# Patient Record
Sex: FEMALE | Race: WHITE | NOT HISPANIC OR LATINO | Employment: OTHER | URBAN - METROPOLITAN AREA
[De-identification: names, ages, dates, MRNs, and addresses within clinical notes are randomized per-mention and may not be internally consistent; named-entity substitution may affect disease eponyms.]

---

## 2017-04-10 ENCOUNTER — AMBULATORY - HEALTHEAST (OUTPATIENT)
Dept: INTERNAL MEDICINE | Facility: CLINIC | Age: 72
End: 2017-04-10

## 2017-04-10 DIAGNOSIS — Z79.899 ENCOUNTER FOR LONG-TERM (CURRENT) USE OF OTHER MEDICATIONS: ICD-10-CM

## 2017-04-10 DIAGNOSIS — E78.5 HYPERLIPEMIA: ICD-10-CM

## 2017-04-10 DIAGNOSIS — D75.89 MACROCYTOSIS: ICD-10-CM

## 2017-04-10 DIAGNOSIS — M89.9 DISORDER OF BONE AND CARTILAGE: ICD-10-CM

## 2017-04-10 DIAGNOSIS — M94.9 DISORDER OF BONE AND CARTILAGE: ICD-10-CM

## 2017-04-11 ENCOUNTER — RECORDS - HEALTHEAST (OUTPATIENT)
Dept: ADMINISTRATIVE | Facility: OTHER | Age: 72
End: 2017-04-11

## 2017-04-11 ENCOUNTER — OFFICE VISIT - HEALTHEAST (OUTPATIENT)
Dept: INTERNAL MEDICINE | Facility: CLINIC | Age: 72
End: 2017-04-11

## 2017-04-11 ENCOUNTER — RECORDS - HEALTHEAST (OUTPATIENT)
Dept: BONE DENSITY | Facility: CLINIC | Age: 72
End: 2017-04-11

## 2017-04-11 DIAGNOSIS — D75.89 MACROCYTOSIS: ICD-10-CM

## 2017-04-11 DIAGNOSIS — E78.5 HYPERLIPEMIA: ICD-10-CM

## 2017-04-11 DIAGNOSIS — Z79.899 ENCOUNTER FOR LONG-TERM (CURRENT) USE OF OTHER MEDICATIONS: ICD-10-CM

## 2017-04-11 DIAGNOSIS — Z11.59 SCREENING FOR VIRAL DISEASE: ICD-10-CM

## 2017-04-11 DIAGNOSIS — M89.9 DISORDER OF BONE AND CARTILAGE: ICD-10-CM

## 2017-04-11 DIAGNOSIS — M94.9 DISORDER OF BONE AND CARTILAGE: ICD-10-CM

## 2017-04-11 DIAGNOSIS — M94.9 DISORDER OF CARTILAGE, UNSPECIFIED: ICD-10-CM

## 2017-04-11 DIAGNOSIS — Z00.00 MEDICARE ANNUAL WELLNESS VISIT, INITIAL: ICD-10-CM

## 2017-04-11 DIAGNOSIS — M89.9 DISORDER OF BONE, UNSPECIFIED: ICD-10-CM

## 2017-04-11 LAB
CHOLEST SERPL-MCNC: 173 MG/DL
FASTING STATUS PATIENT QL REPORTED: YES
HDLC SERPL-MCNC: 58 MG/DL
LDLC SERPL CALC-MCNC: 93 MG/DL
TRIGL SERPL-MCNC: 108 MG/DL

## 2017-04-11 ASSESSMENT — MIFFLIN-ST. JEOR: SCORE: 1189.89

## 2017-04-12 LAB — HCV AB SERPL QL IA: NEGATIVE

## 2017-04-17 ENCOUNTER — COMMUNICATION - HEALTHEAST (OUTPATIENT)
Dept: INTERNAL MEDICINE | Facility: CLINIC | Age: 72
End: 2017-04-17

## 2017-04-17 DIAGNOSIS — E78.5 HYPERLIPIDEMIA: ICD-10-CM

## 2017-04-17 DIAGNOSIS — M81.0 OSTEOPOROSIS: ICD-10-CM

## 2017-04-19 ENCOUNTER — COMMUNICATION - HEALTHEAST (OUTPATIENT)
Dept: INTERNAL MEDICINE | Facility: CLINIC | Age: 72
End: 2017-04-19

## 2017-04-19 DIAGNOSIS — M81.0 OSTEOPOROSIS: ICD-10-CM

## 2017-08-09 ENCOUNTER — OFFICE VISIT - HEALTHEAST (OUTPATIENT)
Dept: INTERNAL MEDICINE | Facility: CLINIC | Age: 72
End: 2017-08-09

## 2017-08-09 DIAGNOSIS — M81.0 OSTEOPOROSIS: ICD-10-CM

## 2017-08-09 DIAGNOSIS — E87.5 SERUM POTASSIUM ELEVATED: ICD-10-CM

## 2017-08-09 ASSESSMENT — MIFFLIN-ST. JEOR: SCORE: 1181.95

## 2017-10-12 ENCOUNTER — RECORDS - HEALTHEAST (OUTPATIENT)
Dept: ADMINISTRATIVE | Facility: OTHER | Age: 72
End: 2017-10-12

## 2017-10-30 ENCOUNTER — COMMUNICATION - HEALTHEAST (OUTPATIENT)
Dept: INTERNAL MEDICINE | Facility: CLINIC | Age: 72
End: 2017-10-30

## 2018-02-05 ENCOUNTER — COMMUNICATION - HEALTHEAST (OUTPATIENT)
Dept: INTERNAL MEDICINE | Facility: CLINIC | Age: 73
End: 2018-02-05

## 2018-02-05 DIAGNOSIS — M81.0 OSTEOPOROSIS: ICD-10-CM

## 2018-02-05 DIAGNOSIS — E78.5 HYPERLIPIDEMIA: ICD-10-CM

## 2018-03-01 ENCOUNTER — OFFICE VISIT - HEALTHEAST (OUTPATIENT)
Dept: AUDIOLOGY | Facility: CLINIC | Age: 73
End: 2018-03-01

## 2018-03-01 DIAGNOSIS — H90.3 SENSORINEURAL HEARING LOSS, BILATERAL: ICD-10-CM

## 2018-03-01 DIAGNOSIS — H93.13 TINNITUS OF BOTH EARS: ICD-10-CM

## 2018-03-02 ENCOUNTER — COMMUNICATION - HEALTHEAST (OUTPATIENT)
Dept: ADMINISTRATIVE | Facility: CLINIC | Age: 73
End: 2018-03-02

## 2018-05-07 ENCOUNTER — AMBULATORY - HEALTHEAST (OUTPATIENT)
Dept: INTERNAL MEDICINE | Facility: CLINIC | Age: 73
End: 2018-05-07

## 2018-05-07 ENCOUNTER — OFFICE VISIT - HEALTHEAST (OUTPATIENT)
Dept: AUDIOLOGY | Facility: CLINIC | Age: 73
End: 2018-05-07

## 2018-05-07 ENCOUNTER — OFFICE VISIT - HEALTHEAST (OUTPATIENT)
Dept: INTERNAL MEDICINE | Facility: CLINIC | Age: 73
End: 2018-05-07

## 2018-05-07 DIAGNOSIS — G89.29 LEFT FLANK PAIN, CHRONIC: ICD-10-CM

## 2018-05-07 DIAGNOSIS — E55.9 VITAMIN D DEFICIENCY: ICD-10-CM

## 2018-05-07 DIAGNOSIS — R10.9 LEFT FLANK PAIN, CHRONIC: ICD-10-CM

## 2018-05-07 DIAGNOSIS — H90.3 SENSORINEURAL HEARING LOSS, BILATERAL: ICD-10-CM

## 2018-05-07 DIAGNOSIS — E78.5 HYPERLIPIDEMIA, UNSPECIFIED HYPERLIPIDEMIA TYPE: ICD-10-CM

## 2018-05-07 DIAGNOSIS — Z23 NEED FOR VACCINATION: ICD-10-CM

## 2018-05-07 DIAGNOSIS — Z00.00 MEDICARE ANNUAL WELLNESS VISIT, SUBSEQUENT: ICD-10-CM

## 2018-05-07 DIAGNOSIS — Z00.00 ROUTINE GENERAL MEDICAL EXAMINATION AT A HEALTH CARE FACILITY: ICD-10-CM

## 2018-05-07 LAB
ALBUMIN SERPL-MCNC: 4.2 G/DL (ref 3.5–5)
ALBUMIN UR-MCNC: NEGATIVE MG/DL
ALP SERPL-CCNC: 48 U/L (ref 45–120)
ALT SERPL W P-5'-P-CCNC: 23 U/L (ref 0–45)
ANION GAP SERPL CALCULATED.3IONS-SCNC: 11 MMOL/L (ref 5–18)
APPEARANCE UR: CLEAR
AST SERPL W P-5'-P-CCNC: 24 U/L (ref 0–40)
BACTERIA #/AREA URNS HPF: ABNORMAL HPF
BILIRUB SERPL-MCNC: 1.8 MG/DL (ref 0–1)
BILIRUB UR QL STRIP: NEGATIVE
BUN SERPL-MCNC: 12 MG/DL (ref 8–28)
CALCIUM SERPL-MCNC: 9.8 MG/DL (ref 8.5–10.5)
CHLORIDE BLD-SCNC: 105 MMOL/L (ref 98–107)
CHOLEST SERPL-MCNC: 175 MG/DL
CO2 SERPL-SCNC: 27 MMOL/L (ref 22–31)
COLOR UR AUTO: YELLOW
CREAT SERPL-MCNC: 0.71 MG/DL (ref 0.6–1.1)
ERYTHROCYTE [DISTWIDTH] IN BLOOD BY AUTOMATED COUNT: 10.8 % (ref 11–14.5)
FASTING STATUS PATIENT QL REPORTED: YES
GFR SERPL CREATININE-BSD FRML MDRD: >60 ML/MIN/1.73M2
GLUCOSE BLD-MCNC: 84 MG/DL (ref 70–125)
GLUCOSE UR STRIP-MCNC: NEGATIVE MG/DL
HCT VFR BLD AUTO: 42.9 % (ref 35–47)
HDLC SERPL-MCNC: 57 MG/DL
HGB BLD-MCNC: 14.8 G/DL (ref 12–16)
HGB UR QL STRIP: ABNORMAL
KETONES UR STRIP-MCNC: ABNORMAL MG/DL
LDLC SERPL CALC-MCNC: 99 MG/DL
LEUKOCYTE ESTERASE UR QL STRIP: NEGATIVE
MCH RBC QN AUTO: 32.6 PG (ref 27–34)
MCHC RBC AUTO-ENTMCNC: 34.5 G/DL (ref 32–36)
MCV RBC AUTO: 95 FL (ref 80–100)
NITRATE UR QL: NEGATIVE
PH UR STRIP: 5.5 [PH] (ref 5–8)
PLATELET # BLD AUTO: 220 THOU/UL (ref 140–440)
PMV BLD AUTO: 7.8 FL (ref 7–10)
POTASSIUM BLD-SCNC: 4.5 MMOL/L (ref 3.5–5)
PROT SERPL-MCNC: 7 G/DL (ref 6–8)
RBC # BLD AUTO: 4.53 MILL/UL (ref 3.8–5.4)
RBC #/AREA URNS AUTO: ABNORMAL HPF
SODIUM SERPL-SCNC: 143 MMOL/L (ref 136–145)
SP GR UR STRIP: 1.01 (ref 1–1.03)
TRIGL SERPL-MCNC: 93 MG/DL
TSH SERPL DL<=0.005 MIU/L-ACNC: 0.84 UIU/ML (ref 0.3–5)
UROBILINOGEN UR STRIP-ACNC: ABNORMAL
WBC #/AREA URNS AUTO: ABNORMAL HPF
WBC: 7.6 THOU/UL (ref 4–11)

## 2018-05-07 ASSESSMENT — MIFFLIN-ST. JEOR: SCORE: 1174.86

## 2018-05-08 LAB
25(OH)D3 SERPL-MCNC: 53.2 NG/ML (ref 30–80)
25(OH)D3 SERPL-MCNC: 53.2 NG/ML (ref 30–80)

## 2018-05-16 ENCOUNTER — OFFICE VISIT - HEALTHEAST (OUTPATIENT)
Dept: AUDIOLOGY | Facility: CLINIC | Age: 73
End: 2018-05-16

## 2018-05-16 DIAGNOSIS — H90.3 SENSORINEURAL HEARING LOSS, BILATERAL: ICD-10-CM

## 2018-06-14 ENCOUNTER — OFFICE VISIT - HEALTHEAST (OUTPATIENT)
Dept: AUDIOLOGY | Facility: CLINIC | Age: 73
End: 2018-06-14

## 2018-06-14 DIAGNOSIS — H90.3 SENSORINEURAL HEARING LOSS, BILATERAL: ICD-10-CM

## 2018-06-27 ENCOUNTER — RECORDS - HEALTHEAST (OUTPATIENT)
Dept: ADMINISTRATIVE | Facility: OTHER | Age: 73
End: 2018-06-27

## 2018-07-12 ENCOUNTER — OFFICE VISIT - HEALTHEAST (OUTPATIENT)
Dept: AUDIOLOGY | Facility: CLINIC | Age: 73
End: 2018-07-12

## 2018-07-12 DIAGNOSIS — H90.3 SENSORINEURAL HEARING LOSS, BILATERAL: ICD-10-CM

## 2018-11-30 ENCOUNTER — COMMUNICATION - HEALTHEAST (OUTPATIENT)
Dept: INTERNAL MEDICINE | Facility: CLINIC | Age: 73
End: 2018-11-30

## 2018-12-03 ENCOUNTER — COMMUNICATION - HEALTHEAST (OUTPATIENT)
Dept: INTERNAL MEDICINE | Facility: CLINIC | Age: 73
End: 2018-12-03

## 2018-12-05 ENCOUNTER — COMMUNICATION - HEALTHEAST (OUTPATIENT)
Dept: INTERNAL MEDICINE | Facility: CLINIC | Age: 73
End: 2018-12-05

## 2018-12-05 ENCOUNTER — RECORDS - HEALTHEAST (OUTPATIENT)
Dept: ADMINISTRATIVE | Facility: OTHER | Age: 73
End: 2018-12-05

## 2019-01-07 ENCOUNTER — COMMUNICATION - HEALTHEAST (OUTPATIENT)
Dept: INTERNAL MEDICINE | Facility: CLINIC | Age: 74
End: 2019-01-07

## 2019-01-07 DIAGNOSIS — M81.0 OSTEOPOROSIS: ICD-10-CM

## 2019-01-09 ENCOUNTER — COMMUNICATION - HEALTHEAST (OUTPATIENT)
Dept: INTERNAL MEDICINE | Facility: CLINIC | Age: 74
End: 2019-01-09

## 2019-01-09 DIAGNOSIS — M81.0 OSTEOPOROSIS: ICD-10-CM

## 2019-01-24 ENCOUNTER — COMMUNICATION - HEALTHEAST (OUTPATIENT)
Dept: AUDIOLOGY | Facility: CLINIC | Age: 74
End: 2019-01-24

## 2019-04-15 ENCOUNTER — COMMUNICATION - HEALTHEAST (OUTPATIENT)
Dept: INTERNAL MEDICINE | Facility: CLINIC | Age: 74
End: 2019-04-15

## 2019-04-15 DIAGNOSIS — M81.0 OSTEOPOROSIS: ICD-10-CM

## 2019-04-16 ENCOUNTER — COMMUNICATION - HEALTHEAST (OUTPATIENT)
Dept: INTERNAL MEDICINE | Facility: CLINIC | Age: 74
End: 2019-04-16

## 2019-04-16 DIAGNOSIS — E78.5 HYPERLIPIDEMIA: ICD-10-CM

## 2019-04-16 DIAGNOSIS — M81.0 OSTEOPOROSIS: ICD-10-CM

## 2019-06-04 ENCOUNTER — RECORDS - HEALTHEAST (OUTPATIENT)
Dept: ADMINISTRATIVE | Facility: OTHER | Age: 74
End: 2019-06-04

## 2019-06-20 ENCOUNTER — COMMUNICATION - HEALTHEAST (OUTPATIENT)
Dept: FAMILY MEDICINE | Facility: CLINIC | Age: 74
End: 2019-06-20

## 2019-07-01 ENCOUNTER — AMBULATORY - HEALTHEAST (OUTPATIENT)
Dept: FAMILY MEDICINE | Facility: CLINIC | Age: 74
End: 2019-07-01

## 2019-07-01 ENCOUNTER — OFFICE VISIT - HEALTHEAST (OUTPATIENT)
Dept: FAMILY MEDICINE | Facility: CLINIC | Age: 74
End: 2019-07-01

## 2019-07-01 DIAGNOSIS — M81.0 AGE-RELATED OSTEOPOROSIS WITHOUT CURRENT PATHOLOGICAL FRACTURE: ICD-10-CM

## 2019-07-01 DIAGNOSIS — Z87.891 PERSONAL HISTORY OF TOBACCO USE, PRESENTING HAZARDS TO HEALTH: ICD-10-CM

## 2019-07-01 DIAGNOSIS — M89.9 DISORDER OF BONE AND CARTILAGE: ICD-10-CM

## 2019-07-01 DIAGNOSIS — M94.9 DISORDER OF BONE AND CARTILAGE: ICD-10-CM

## 2019-07-01 DIAGNOSIS — Z00.01 ENCOUNTER FOR GENERAL ADULT MEDICAL EXAMINATION WITH ABNORMAL FINDINGS: ICD-10-CM

## 2019-07-01 DIAGNOSIS — E78.5 HYPERLIPIDEMIA, UNSPECIFIED HYPERLIPIDEMIA TYPE: ICD-10-CM

## 2019-07-01 LAB
ALBUMIN SERPL-MCNC: 3.9 G/DL (ref 3.5–5)
ALP SERPL-CCNC: 45 U/L (ref 45–120)
ALT SERPL W P-5'-P-CCNC: 25 U/L (ref 0–45)
ANION GAP SERPL CALCULATED.3IONS-SCNC: 10 MMOL/L (ref 5–18)
AST SERPL W P-5'-P-CCNC: 26 U/L (ref 0–40)
BILIRUB SERPL-MCNC: 1.3 MG/DL (ref 0–1)
BUN SERPL-MCNC: 6 MG/DL (ref 8–28)
CALCIUM SERPL-MCNC: 10.4 MG/DL (ref 8.5–10.5)
CHLORIDE BLD-SCNC: 108 MMOL/L (ref 98–107)
CHOLEST SERPL-MCNC: 132 MG/DL
CO2 SERPL-SCNC: 27 MMOL/L (ref 22–31)
CREAT SERPL-MCNC: 0.73 MG/DL (ref 0.6–1.1)
FASTING STATUS PATIENT QL REPORTED: YES
GFR SERPL CREATININE-BSD FRML MDRD: >60 ML/MIN/1.73M2
GLUCOSE BLD-MCNC: 95 MG/DL (ref 70–125)
HDLC SERPL-MCNC: 52 MG/DL
LDLC SERPL CALC-MCNC: 62 MG/DL
POTASSIUM BLD-SCNC: 5.9 MMOL/L (ref 3.5–5)
PROT SERPL-MCNC: 6.8 G/DL (ref 6–8)
SODIUM SERPL-SCNC: 145 MMOL/L (ref 136–145)
TRIGL SERPL-MCNC: 90 MG/DL

## 2019-07-01 RX ORDER — PREDNISOLONE ACETATE 10 MG/ML
SUSPENSION/ DROPS OPHTHALMIC
Refills: 1 | Status: SHIPPED | COMMUNITY
Start: 2019-06-26 | End: 2021-08-23

## 2019-07-01 RX ORDER — MOXIFLOXACIN 5 MG/ML
SOLUTION/ DROPS OPHTHALMIC
Refills: 1 | Status: SHIPPED | COMMUNITY
Start: 2019-06-26 | End: 2021-08-23

## 2019-07-01 RX ORDER — CHLORAL HYDRATE 500 MG
2 CAPSULE ORAL DAILY
Status: SHIPPED | COMMUNITY
Start: 2019-07-01 | End: 2022-09-13

## 2019-07-01 ASSESSMENT — MIFFLIN-ST. JEOR: SCORE: 1158.7

## 2019-07-02 LAB — 25(OH)D3 SERPL-MCNC: 62.3 NG/ML (ref 30–80)

## 2019-07-05 ENCOUNTER — OFFICE VISIT - HEALTHEAST (OUTPATIENT)
Dept: FAMILY MEDICINE | Facility: CLINIC | Age: 74
End: 2019-07-05

## 2019-07-05 DIAGNOSIS — E78.5 HYPERLIPIDEMIA: ICD-10-CM

## 2019-07-05 DIAGNOSIS — H26.9 CATARACT, UNSPECIFIED CATARACT TYPE, UNSPECIFIED LATERALITY: ICD-10-CM

## 2019-07-05 DIAGNOSIS — E87.5 HYPERKALEMIA: ICD-10-CM

## 2019-07-05 DIAGNOSIS — Z01.818 PREOP GENERAL PHYSICAL EXAM: ICD-10-CM

## 2019-07-05 DIAGNOSIS — M81.0 OSTEOPOROSIS: ICD-10-CM

## 2019-07-05 LAB — POTASSIUM BLD-SCNC: 5.9 MMOL/L (ref 3.5–5)

## 2019-07-05 ASSESSMENT — MIFFLIN-ST. JEOR: SCORE: 1154.16

## 2019-07-07 ENCOUNTER — AMBULATORY - HEALTHEAST (OUTPATIENT)
Dept: FAMILY MEDICINE | Facility: CLINIC | Age: 74
End: 2019-07-07

## 2019-07-07 DIAGNOSIS — E87.5 HYPERKALEMIA: ICD-10-CM

## 2019-07-08 ENCOUNTER — COMMUNICATION - HEALTHEAST (OUTPATIENT)
Dept: FAMILY MEDICINE | Facility: CLINIC | Age: 74
End: 2019-07-08

## 2019-07-09 ENCOUNTER — RECORDS - HEALTHEAST (OUTPATIENT)
Dept: BONE DENSITY | Facility: CLINIC | Age: 74
End: 2019-07-09

## 2019-07-09 ENCOUNTER — RECORDS - HEALTHEAST (OUTPATIENT)
Dept: ADMINISTRATIVE | Facility: OTHER | Age: 74
End: 2019-07-09

## 2019-07-09 DIAGNOSIS — M81.0 AGE-RELATED OSTEOPOROSIS WITHOUT CURRENT PATHOLOGICAL FRACTURE: ICD-10-CM

## 2019-07-15 ENCOUNTER — AMBULATORY - HEALTHEAST (OUTPATIENT)
Dept: LAB | Facility: CLINIC | Age: 74
End: 2019-07-15

## 2019-07-15 DIAGNOSIS — E87.5 HYPERKALEMIA: ICD-10-CM

## 2019-07-15 LAB — POTASSIUM BLD-SCNC: 4.8 MMOL/L (ref 3.5–5)

## 2019-07-19 ENCOUNTER — COMMUNICATION - HEALTHEAST (OUTPATIENT)
Dept: FAMILY MEDICINE | Facility: CLINIC | Age: 74
End: 2019-07-19

## 2019-07-22 ENCOUNTER — COMMUNICATION - HEALTHEAST (OUTPATIENT)
Dept: FAMILY MEDICINE | Facility: CLINIC | Age: 74
End: 2019-07-22

## 2019-09-16 ENCOUNTER — HOSPITAL ENCOUNTER (OUTPATIENT)
Dept: CT IMAGING | Facility: HOSPITAL | Age: 74
Discharge: HOME | End: 2019-09-16
Attending: FAMILY MEDICINE

## 2019-09-16 DIAGNOSIS — Z87.891 PERSONAL HISTORY OF TOBACCO USE, PRESENTING HAZARDS TO HEALTH: ICD-10-CM

## 2019-09-17 ENCOUNTER — COMMUNICATION - HEALTHEAST (OUTPATIENT)
Dept: FAMILY MEDICINE | Facility: CLINIC | Age: 74
End: 2019-09-17

## 2019-09-18 ENCOUNTER — COMMUNICATION - HEALTHEAST (OUTPATIENT)
Dept: FAMILY MEDICINE | Facility: CLINIC | Age: 74
End: 2019-09-18

## 2019-09-20 ENCOUNTER — COMMUNICATION - HEALTHEAST (OUTPATIENT)
Dept: FAMILY MEDICINE | Facility: CLINIC | Age: 74
End: 2019-09-20

## 2020-03-15 ENCOUNTER — COMMUNICATION - HEALTHEAST (OUTPATIENT)
Dept: INTERNAL MEDICINE | Facility: CLINIC | Age: 75
End: 2020-03-15

## 2020-03-15 DIAGNOSIS — M81.0 OSTEOPOROSIS: ICD-10-CM

## 2020-03-17 ENCOUNTER — COMMUNICATION - HEALTHEAST (OUTPATIENT)
Dept: FAMILY MEDICINE | Facility: CLINIC | Age: 75
End: 2020-03-17

## 2020-03-17 DIAGNOSIS — E78.5 HYPERLIPIDEMIA: ICD-10-CM

## 2020-03-17 DIAGNOSIS — M81.0 OSTEOPOROSIS: ICD-10-CM

## 2020-03-21 ENCOUNTER — COMMUNICATION - HEALTHEAST (OUTPATIENT)
Dept: FAMILY MEDICINE | Facility: CLINIC | Age: 75
End: 2020-03-21

## 2020-03-21 DIAGNOSIS — E78.5 HYPERLIPIDEMIA: ICD-10-CM

## 2020-06-08 ENCOUNTER — COMMUNICATION - HEALTHEAST (OUTPATIENT)
Dept: INTERNAL MEDICINE | Facility: CLINIC | Age: 75
End: 2020-06-08

## 2020-06-08 DIAGNOSIS — M81.0 OSTEOPOROSIS: ICD-10-CM

## 2020-09-24 ENCOUNTER — COMMUNICATION - HEALTHEAST (OUTPATIENT)
Dept: PULMONOLOGY | Facility: OTHER | Age: 75
End: 2020-09-24

## 2020-10-22 ENCOUNTER — COMMUNICATION - HEALTHEAST (OUTPATIENT)
Dept: PULMONOLOGY | Facility: OTHER | Age: 75
End: 2020-10-22

## 2020-10-22 ENCOUNTER — COMMUNICATION - HEALTHEAST (OUTPATIENT)
Dept: FAMILY MEDICINE | Facility: CLINIC | Age: 75
End: 2020-10-22

## 2020-11-05 ENCOUNTER — COMMUNICATION - HEALTHEAST (OUTPATIENT)
Dept: FAMILY MEDICINE | Facility: CLINIC | Age: 75
End: 2020-11-05

## 2021-04-01 ENCOUNTER — COMMUNICATION - HEALTHEAST (OUTPATIENT)
Dept: FAMILY MEDICINE | Facility: CLINIC | Age: 76
End: 2021-04-01

## 2021-04-16 ENCOUNTER — COMMUNICATION - HEALTHEAST (OUTPATIENT)
Dept: FAMILY MEDICINE | Facility: CLINIC | Age: 76
End: 2021-04-16

## 2021-04-17 ENCOUNTER — COMMUNICATION - HEALTHEAST (OUTPATIENT)
Dept: FAMILY MEDICINE | Facility: CLINIC | Age: 76
End: 2021-04-17

## 2021-04-17 DIAGNOSIS — E78.5 HYPERLIPIDEMIA: ICD-10-CM

## 2021-04-18 RX ORDER — ATORVASTATIN CALCIUM 20 MG/1
TABLET, FILM COATED ORAL
Qty: 90 TABLET | Refills: 3 | Status: SHIPPED | OUTPATIENT
Start: 2021-04-18 | End: 2022-04-14

## 2021-05-13 ENCOUNTER — RECORDS - HEALTHEAST (OUTPATIENT)
Dept: FAMILY MEDICINE | Facility: CLINIC | Age: 76
End: 2021-05-13

## 2021-05-13 DIAGNOSIS — M81.0 OSTEOPOROSIS: ICD-10-CM

## 2021-05-13 RX ORDER — ALENDRONATE SODIUM 70 MG/1
TABLET ORAL
Qty: 12 TABLET | Refills: 0 | Status: SHIPPED | OUTPATIENT
Start: 2021-05-13 | End: 2021-08-09

## 2021-05-26 ENCOUNTER — RECORDS - HEALTHEAST (OUTPATIENT)
Dept: ADMINISTRATIVE | Facility: CLINIC | Age: 76
End: 2021-05-26

## 2021-05-29 NOTE — TELEPHONE ENCOUNTER
New Appointment Needed  What is the reason for the visit:    Pre-Op Appt Request  When is the surgery? :  7-17-19 and 7-31-19  Where is the surgery?:   [facility]  Who is the surgeon? :  Dr Taylor  What type of surgery is being done?: cataract  Provider Preference: Dr Diez, patient is scheduled to see him on 7-1-19 for an establish care annual physical  How soon do you need to be seen?: patient would like to be seen between 7-2-19 and 7-15-19, did not schedule the pre-op as patient has not established care with Dr Diez yet, patient states she is transferring her care as the [facility] is much closer than the [facility]  Waitlist offered?: No  Okay to leave a detailed message:  Yes

## 2021-05-30 VITALS — WEIGHT: 160 LBS | HEIGHT: 63 IN | BODY MASS INDEX: 28.35 KG/M2

## 2021-05-30 NOTE — PROGRESS NOTES
Preoperative Exam    Scheduled Procedure: cataract   Surgery Date:  7/17, 7/31  Surgery Location: [facility], [place], fax [phone]    Surgeon:  Dr Perry    Assessment/Plan:     Jesika was seen today for pre-op exam.    Preop general physical exam    Cataract, unspecified cataract type, unspecified laterality    Hyperlipidemia    Osteoporosis    Hyperkalemia  -     Potassium      Surgical Procedure Risk: Low (reported cardiac risk generally < 1%)  Have you had prior anesthesia?: No  Have you or any family members had a previous anesthesia reaction:  No  Do you or any family members have a history of a clotting or bleeding disorder?: No  Cardiac Risk Assessment: no increased risk for major cardiac complications    Patient approved for surgery with general or local anesthesia.      Functional Status: Independent  Patient plans to recover at home with family.     Subjective:      Jesika Paez is a 73 y.o. female who presents for a preoperative consultation.      She has bilateral symptomatic cataracts and needs surgical procedure for vision correction.    She has hyperlipidemia and osteoporosis.  She was recently seen for a physical exam and no significant concerns were identified.  She continues to feel well, there is no sign of any acute illness.  She has no history of lung disease or vascular disease.  She is a former smoker.    She reports no problems with prior anesthesia.    All other systems reviewed and are negative, other than those listed in the HPI.    Pertinent History  Do you have difficulty breathing or chest pain after walking up a flight of stairs: No  History of obstructive sleep apnea: No  Steroid use in the last 6 months: No  Frequent Aspirin/NSAID use: 81mg aspirin  Prior Blood Transfusion: No  Prior Blood Transfusion Reaction: No  If for some reason prior to, during or after the procedure, if it is medically indicated, would you be willing to have a blood transfusion?:   There is no transfusion refusal.    Current Outpatient Medications   Medication Sig Dispense Refill     alendronate (FOSAMAX) 70 MG tablet SEE NOTES 12 tablet 3     ascorbate calcium/bioflavonoid (ANGELIQUE-C WITH BIOFLAVONOIDS ORAL) Take by mouth.       aspirin 81 mg chewable tablet Chew 81 mg daily.       atorvastatin (LIPITOR) 20 MG tablet TAKE 1 TABLET BY MOUTH EVERY DAY 90 tablet 3     BILBERRY FRUIT ORAL Take by mouth.       calcium carbonate-vitamin D3 (CALCIUM 600 WITH VITAMIN D3) 600 mg(1,500mg) -200 unit per tablet Take 1 tablet by mouth daily.  0     cholecalciferol, vitamin D3, 2,000 unit Tab 2 tabs a day 100 each 3     COLLAGEN MISC Use As Directed.       moxifloxacin (VIGAMOX) 0.5 % ophthalmic solution PLACE 1 DROP INTO LEFT EYE 4 TIMES DAILY. START 1 DAY PRIOR TO SURGERY AND USE UNTIL GONE  1     omega-3/dha/epa/fish oil (FISH OIL-OMEGA-3 FATTY ACIDS) 300-1,000 mg capsule Take 2 g by mouth daily.       prednisoLONE acetate (PRED-FORTE) 1 % ophthalmic suspension PLACE 1 DROP IN LEFT EYE 4 TIMES DAILY. START 1 DAY PRIOR TO SURGERY AND USE UNTIL TOLD TO STOP  1     UNABLE TO FIND Med Name: stone free       UNABLE TO FIND Med Name: primose oil 1000mg qd       VIT A/VIT C/VIT E/ZINC/COPPER (PRESERVISION AREDS ORAL) Take as directed       No current facility-administered medications for this visit.         Allergies   Allergen Reactions     Codeine Sulfate        Patient Active Problem List   Diagnosis     Hyperlipemia     Hearing Loss     Osteopenia       Past Medical History:   Diagnosis Date     Nicotine Dependence     Created by Conversion      Recent Weight Gain Involuntary     Created by Conversion        Past Surgical History:   Procedure Laterality Date     ANKLE FRACTURE SURGERY       CHOLECYSTECTOMY       FRACTURE SURGERY       HYSTERECTOMY       LA ARVIZU W/O FACETEC FORAMOT/DSKC 1/2 VRT SEG, LUMBAR      Description: Laminectomy Decompressive Up To Two Lumbar Segments;  Recorded: 01/12/2010;  Comments:  X2 SEPARATE BACK SURGERIES     CT LIGATE FALLOPIAN TUBE      Description: Tubal Ligation;  Recorded: 2010;     CT OPEN TREATMENT BIMALLEOLAR ANKLE FRACTURE      Description: Open Treatment Of Bimalleolar Ankle Fracture;  Recorded: 2010;     CT REMOVAL GALLBLADDER      Description: Cholecystectomy;  Recorded: 2010;     CT REMOVE TONSILS/ADENOIDS,<13 Y/O      Description: Tonsillectomy With Adenoidectomy;  Recorded: 2010;     CT TOTAL ABDOM HYSTERECTOMY      Description: Total Abdominal Hysterectomy;  Recorded: 2010;     CT TOTAL ABDOM HYSTERECTOMY      Description: Hysterectomy;  Recorded: 2010;       Social History     Socioeconomic History     Marital status:      Spouse name: Not on file     Number of children: Not on file     Years of education: Not on file     Highest education level: Not on file   Occupational History     Not on file   Social Needs     Financial resource strain: Not on file     Food insecurity:     Worry: Not on file     Inability: Not on file     Transportation needs:     Medical: Not on file     Non-medical: Not on file   Tobacco Use     Smoking status: Former Smoker     Years: 30.00     Last attempt to quit: 2012     Years since quittin.4     Smokeless tobacco: Never Used   Substance and Sexual Activity     Alcohol use: No     Drug use: No     Sexual activity: Not on file   Lifestyle     Physical activity:     Days per week: Not on file     Minutes per session: Not on file     Stress: Not on file   Relationships     Social connections:     Talks on phone: Not on file     Gets together: Not on file     Attends [employer] service: Not on file     Active member of club or organization: Not on file     Attends meetings of clubs or organizations: Not on file     Relationship status: Not on file     Intimate partner violence:     Fear of current or ex partner: Not on file     Emotionally abused: Not on file     Physically abused: Not on file      "Forced sexual activity: Not on file   Other Topics Concern     Not on file   Social History Narrative    Patient has been  4 times.  She lives with her son.  Her younger son committed suicide at the age of 24.     Patient Care Team:  Abrahan Diez MD as PCP - General (Family Medicine)  Chelsea Andrew AuD    Objective:     Vitals:    07/05/19 1113   BP: 114/68   Pulse: (!) 56   Resp: 16   SpO2: 98%   Weight: 153 lb (69.4 kg)   Height: 5' 2.75\" (1.594 m)     Physical Exam:    General Appearance:    Alert, cooperative, no distress   Eyes:   No scleral icterus or conjunctival irritation       Ears:    Normal TM's and external ear canals, both ears   Throat:   Lips, mucosa, and tongue normal; teeth and gums normal   Neck:   Supple, symmetrical, trachea midline, no adenopathy;        thyroid:  No enlargement/tenderness/nodules   Lungs:     Clear to auscultation bilaterally, respirations unlabored, no wheezes or crackles   Heart:    Regular rate and rhythm,  No murmur   Abdomen:    Soft, no distention, no tenderness on palpation, no masses, no organomegaly     Extremities:  No edema, no joint swelling or redness, no evidence of any injuries   Skin:  No concerning skin findings, no suspicious moles, no rashes   Neurologic:  On gross examination there is no motor or sensory deficit.  Patient walks with a normal gait       Recent Results (from the past 240 hour(s))   Potassium    Collection Time: 07/15/19  9:46 AM   Result Value Ref Range    Potassium 4.8 3.5 - 5.0 mmol/L         Immunization History   Administered Date(s) Administered     Influenza S1a7-89, 01/12/2010     Influenza high dose, seasonal 09/29/2016, 09/14/2017     Influenza, inj, historic,unspecified 01/12/2010, 10/07/2010, 10/26/2018     Pneumo Conj 13-V (2010&after) 02/02/2015, 09/14/2017     Pneumo Polysac 23-V 01/12/2010     Td,adult,historic,unspecified 10/07/2010     ZOSTER, LIVE 04/15/2011     ZOSTER, RECOMBINANT, IM 07/17/2018     Zoster " Vaccine Unspecified (Historical) 05/08/2018           Electronically signed by Abrahan Diez MD 07/05/19 11:17 AM

## 2021-05-30 NOTE — TELEPHONE ENCOUNTER
Who is calling:  The patient.  Reason for Call:  The patient is wondering if Dr. Diez needs to see her on  07-15-19 or does she just need the labs?  Date of last appointment with primary care: 07-05-19  Okay to leave a detailed message: Yes

## 2021-05-30 NOTE — PROGRESS NOTES
Assessment and Plan:       Jesika was seen today for establish care and annual wellness visit.    Encounter for general adult medical examination with abnormal findings    Osteopenia    Hyperlipidemia, unspecified hyperlipidemia type  -     Comprehensive Metabolic Panel  -     Lipid Cascade    Personal history of tobacco use, presenting hazards to health  -     CT Low Dose Lung Screening Chest; Future    Age-related osteoporosis without current pathological fracture  -     DXA Bone Density Scan; Future  -     Vitamin D, Total (25-Hydroxy)        The patient's current medical problems were reviewed.    The following high BMI interventions were performed this visit: encouragement to exercise  The following health maintenance schedule was reviewed with the patient and provided in printed form in the after visit summary:   Health Maintenance   Topic Date Due     MAMMOGRAM  1945     ZOSTER VACCINES (3 of 3) 2018     DXA SCAN  2019     FALL RISK ASSESSMENT  2019     INFLUENZA VACCINE RULE BASED (1) 2019     TD 18+ HE  10/07/2020     ADVANCE DIRECTIVES DISCUSSED WITH PATIENT  2022     COLONOSCOPY  2023     PNEUMOCOCCAL POLYSACCHARIDE VACCINE AGE 65 AND OVER  Completed     PNEUMOCOCCAL CONJUGATE VACCINE FOR ADULTS (PCV13 OR PREVNAR)  Completed        Subjective:   Chief Complaint: Jesika Paez is an 73 y.o. female here for an Annual Wellness visit.   HPI: This is my first meeting with her.  She had been seen in internal medicine at the [facility] [place] [facility] prior.  Her last office visit was about 1 year ago.  She is a history of hyperlipidemia well-controlled on.  She has been on aspirin therapy long-term.  Her father had  of a stroke.  We did discussion today regarding considerations regarding risk and benefit of aspirin along with current recommendations based on age.  Ultimately patient feels most comfortable continuing her aspirin therapy which I think is reasonable based  on the situation.  She has a history of osteoporosis.  She has been on alendronate for more than 5 years.  Last bone density scan was done 2 years ago.  Discussed repeat bone density scan along with consideration of age and treatment.  She is encouraged to continue calcium and vitamin D supplementation for the time being.  Patient declines breast cancer screening.  She is not due for colon cancer screening but states she would not like to continue with this which is reasonable based on age.  Discussed lung cancer screening.  She has a greater than 30-year smoking history.  She has smoked within the past 15 years.  She is suitable for intervention if it would be necessary.  Discussed all this in great detail and through a shared decision-making process we elected to proceed with lung cancer screening using low-dose CT scan.  Discussed reassessment of laboratory tests.    Review of Systems: Please see above.  The rest of the review of systems are negative for all systems.    Patient Care Team:  Abrahan Diez MD as PCP - General (Family Medicine)  Chelsea Andrew, Karen     Patient Active Problem List   Diagnosis     Hyperlipemia     Hearing Loss     Osteopenia     Past Medical History:   Diagnosis Date     Nicotine Dependence     Created by Conversion      Recent Weight Gain Involuntary     Created by Conversion       Past Surgical History:   Procedure Laterality Date     ANKLE FRACTURE SURGERY       CHOLECYSTECTOMY       FRACTURE SURGERY       HYSTERECTOMY       VT ANDREW W/O FACETEC FORAMOT/DSKC 1/2 VRT SEG, LUMBAR      Description: Laminectomy Decompressive Up To Two Lumbar Segments;  Recorded: 01/12/2010;  Comments: X2 SEPARATE BACK SURGERIES     VT LIGATE FALLOPIAN TUBE      Description: Tubal Ligation;  Recorded: 01/12/2010;     VT OPEN TREATMENT BIMALLEOLAR ANKLE FRACTURE      Description: Open Treatment Of Bimalleolar Ankle Fracture;  Recorded: 01/12/2010;     VT REMOVAL GALLBLADDER      Description:  Cholecystectomy;  Recorded: 2010;     KS REMOVE TONSILS/ADENOIDS,<11 Y/O      Description: Tonsillectomy With Adenoidectomy;  Recorded: 2010;     KS TOTAL ABDOM HYSTERECTOMY      Description: Total Abdominal Hysterectomy;  Recorded: 2010;     KS TOTAL ABDOM HYSTERECTOMY      Description: Hysterectomy;  Recorded: 2010;      Family History   Problem Relation Age of Onset     Dementia Mother      Hearing loss Mother      Heart disease Father      Hyperlipidemia Father      Hypertension Father      Seizures Sister      Pneumonia Brother      Arthritis Brother      Chronic infections Brother      Suicidality Son       Social History     Socioeconomic History     Marital status:      Spouse name: Not on file     Number of children: Not on file     Years of education: Not on file     Highest education level: Not on file   Occupational History     Not on file   Social Needs     Financial resource strain: Not on file     Food insecurity:     Worry: Not on file     Inability: Not on file     Transportation needs:     Medical: Not on file     Non-medical: Not on file   Tobacco Use     Smoking status: Former Smoker     Years: 30.00     Last attempt to quit: 2012     Years since quittin.4     Smokeless tobacco: Never Used   Substance and Sexual Activity     Alcohol use: No     Drug use: No     Sexual activity: Not on file   Lifestyle     Physical activity:     Days per week: Not on file     Minutes per session: Not on file     Stress: Not on file   Relationships     Social connections:     Talks on phone: Not on file     Gets together: Not on file     Attends Tenriism service: Not on file     Active member of club or organization: Not on file     Attends meetings of clubs or organizations: Not on file     Relationship status: Not on file     Intimate partner violence:     Fear of current or ex partner: Not on file     Emotionally abused: Not on file     Physically abused: Not on file      "Forced sexual activity: Not on file   Other Topics Concern     Not on file   Social History Narrative    Patient has been  4 times.  She lives with her son.  Her younger son committed suicide at the age of 24.      Current Outpatient Medications   Medication Sig Dispense Refill     alendronate (FOSAMAX) 70 MG tablet SEE NOTES 12 tablet 3     ascorbate calcium/bioflavonoid (ANGELIQUE-C WITH BIOFLAVONOIDS ORAL) Take by mouth.       aspirin 81 mg chewable tablet Chew 81 mg daily.       atorvastatin (LIPITOR) 20 MG tablet TAKE 1 TABLET BY MOUTH EVERY DAY 90 tablet 3     BILBERRY FRUIT ORAL Take by mouth.       calcium carbonate-vitamin D3 (CALCIUM 600 WITH VITAMIN D3) 600 mg(1,500mg) -200 unit per tablet Take 1 tablet by mouth daily.  0     cholecalciferol, vitamin D3, 2,000 unit Tab 2 tabs a day 100 each 3     COLLAGEN MISC Use As Directed.       moxifloxacin (VIGAMOX) 0.5 % ophthalmic solution PLACE 1 DROP INTO LEFT EYE 4 TIMES DAILY. START 1 DAY PRIOR TO SURGERY AND USE UNTIL GONE  1     omega-3/dha/epa/fish oil (FISH OIL-OMEGA-3 FATTY ACIDS) 300-1,000 mg capsule Take 2 g by mouth daily.       prednisoLONE acetate (PRED-FORTE) 1 % ophthalmic suspension PLACE 1 DROP IN LEFT EYE 4 TIMES DAILY. START 1 DAY PRIOR TO SURGERY AND USE UNTIL TOLD TO STOP  1     UNABLE TO FIND Med Name: stone free       UNABLE TO FIND Med Name: primose oil 1000mg qd       VIT A/VIT C/VIT E/ZINC/COPPER (PRESERVISION AREDS ORAL) Take as directed       No current facility-administered medications for this visit.       Objective:   Vital Signs:   Visit Vitals  /68   Pulse (!) 55   Resp 16   Ht 5' 2.75\" (1.594 m)   Wt 154 lb (69.9 kg)   LMP  (LMP Unknown)   SpO2 97%   BMI 27.50 kg/m       VisionScreening:  No exam data present     PHYSICAL EXAM    General Appearance:    Alert, cooperative, no distress   Eyes:   No scleral icterus or conjunctival irritation       Throat:   Lips, mucosa, and tongue normal; teeth and gums normal   Neck:   " Supple, symmetrical, trachea midline, no adenopathy;        thyroid:  No enlargement/tenderness/nodules   Lungs:     Clear to auscultation bilaterally, respirations unlabored, no wheezes or crackles   Heart:    Regular rate and rhythm,  No murmur   Abdomen:    Soft, no distention, no tenderness on palpation, no masses, no organomegaly     Extremities:  No edema, no joint swelling or redness, no evidence of any injuries   Skin:  No concerning skin findings, no suspicious moles, no rashes   Neurologic:  On gross examination there is no motor or sensory deficit.  Patient walks with a normal gait         Assessment Results 7/1/2019   Activities of Daily Living No help needed   Instrumental Activities of Daily Living No help needed   Get Up and Go Score -   Mini Cog Total Score 5   Some recent data might be hidden     A Mini-Cog score of 0-2 suggests the possibility of dementia, score of 3-5 suggests no dementia    Identified Health Risks:     Information on urinary incontinence and treatment options given to patient.  Information regarding advance directives ([place]), including where she can download the appropriate form, was provided to the patient via the AVS.

## 2021-05-30 NOTE — TELEPHONE ENCOUNTER
PATIENT NAME: Angie Galan   : 1968       Waiver      DATE: 10/26/2020     Dear Patient,    Thank you for choosing [facility] as your health care provider. Your physician has deemed the following medical service(s) necessary.   Kirti Hogan Patient only needs a lab appointment on 7/15 to recheck potassium  Please help schedule a lab only appointment on 7/15/2019

## 2021-05-31 VITALS — BODY MASS INDEX: 28.04 KG/M2 | HEIGHT: 63 IN | WEIGHT: 158.25 LBS

## 2021-06-01 VITALS — HEIGHT: 63 IN | BODY MASS INDEX: 27.92 KG/M2 | WEIGHT: 157.56 LBS

## 2021-06-01 NOTE — TELEPHONE ENCOUNTER
I did not reach out to patient.  I do not know where any communication to her would have originated from.    She did have a CT scan done recently.  In terms of her inquiry about that, the scan was normal.  There is no sign of lung cancer.  The recommendation is to undergo yearly CT scan for screening.

## 2021-06-03 ENCOUNTER — RECORDS - HEALTHEAST (OUTPATIENT)
Dept: ADMINISTRATIVE | Facility: CLINIC | Age: 76
End: 2021-06-03

## 2021-06-03 VITALS — WEIGHT: 153 LBS | HEIGHT: 63 IN | BODY MASS INDEX: 27.11 KG/M2

## 2021-06-03 VITALS — BODY MASS INDEX: 27.29 KG/M2 | WEIGHT: 154 LBS | HEIGHT: 63 IN

## 2021-06-06 NOTE — TELEPHONE ENCOUNTER
Refill Approved    Rx renewed per Medication Renewal Policy. Medication was last renewed on 4/16/19.    Loyda Hsu, [facility] Triage/Med Refill 3/16/2020     Requested Prescriptions   Pending Prescriptions Disp Refills     alendronate (FOSAMAX) 70 MG tablet [Pharmacy Med Name: ALENDRONATE SODIUM 70MG TABS] 12 tablet 3     Sig: TAKE 1 TAB. BY MOUTH ONCE WEEKLY WITH 8 OZ OF WATER. DO NOT LIE DOWN, EAT, DRINK OR TAKE OTHER MEDS FOR 30 MIN.       Biphosphonates Refill Protocol Passed - 3/15/2020  4:24 AM        Passed - PCP or prescribing provider visit in last 12 months     Last office visit with prescriber/PCP: 8/9/2017 Jessica Duggan MD OR same dept: Visit date not found OR same specialty: 8/9/2017 Jessica Duggan MD  Last physical: 5/7/2018 Last MTM visit: Visit date not found   Next visit within 3 mo: Visit date not found  Next physical within 3 mo: Visit date not found  Prescriber OR PCP: Jessica Duggan MD  Last diagnosis associated with med order: 1. Osteoporosis  - alendronate (FOSAMAX) 70 MG tablet [Pharmacy Med Name: ALENDRONATE SODIUM 70MG TABS]; TAKE 1 TAB. BY MOUTH ONCE WEEKLY WITH 8 OZ OF WATER. DO NOT LIE DOWN, EAT, DRINK OR TAKE OTHER MEDS FOR 30 MIN.  Dispense: 12 tablet; Refill: 3    If protocol passes may refill for 12 months if within 3 months of last provider visit (or a total of 15 months).             Passed - Serum creatinine in last 12 months     Creatinine   Date Value Ref Range Status   07/01/2019 0.73 0.60 - 1.10 mg/dL Final

## 2021-06-06 NOTE — TELEPHONE ENCOUNTER
Patient would like refills, she last saw you 7/1/19 establish care and 7/5/19 for a preop. I pended the medications, do you want a to see her or do an e-visit/telephone appointment?

## 2021-06-07 NOTE — TELEPHONE ENCOUNTER
Please inform patient that the refill for atorvastatin has been sent.  Please also inform her that on March 17 I submitted refill authorizations to [facility] pharmacy electronically for her atorvastatin and alendronate as was requested.  I cannot explain why the pharmacy did not receive these authorizations but I appreciate that she let me know.

## 2021-06-08 NOTE — TELEPHONE ENCOUNTER
Medication refill request declined: should have refills on file with the pharmacy :      Disp  Refills  Start  End     alendronate (FOSAMAX) 70 MG tablet  12 tablet  3  3/17/2020      Sig: TAKE 1 TAB. BY MOUTH ONCE WEEKLY WITH 8 OZ OF WATER. DO NOT LIE DOWN, EAT, DRINK OR TAKE OTHER MEDS FOR 30 MIN.     Sent to pharmacy as: alendronate 70 mg tablet (FOSAMAX)     E-Prescribing Status: Receipt confirmed by pharmacy (3/17/2020 12:37 PM CDT)       Mindi Pickett RN [facility] Care Connection Triage/Med Refill 6/10/2020 9:13 AM

## 2021-06-10 NOTE — PROGRESS NOTES
Assessment and Plan:     1. Medicare annual wellness visit, initial     2. Hyperlipemia  Lipid Cascade   3. Osteopenia  DXA Bone Density Scan    Vitamin D, Total (25-Hydroxy)   4. Macrocytosis  HM2(CBC w/o Differential)    Vitamin B12   5. Screening for viral disease  Hepatitis C Antibody (Anti-HCV)   6. Encounter for long-term (current) use of other medications  Comprehensive Metabolic Panel           1. Medications were reviewed and medication refills completed if requested.   A corrected Medication List is listed below on this After Visit    Summary.   New Medications, Refilled medications or Discontinued medications are also listed below.      2. Immunizations were reviewed and updated as necessary.   All up to date  3. If labs were done today, they will either be mailed to you or released to My Chart online if that has been activated.  I labs for her annual physical plus a hepatitis C screen per her request.  4. DXA bone scan - osteopenia and reveals a given 2013 so she agrees to an updated DEXA scan.  5. The patient declines a breast exam and declines a mammogram.  Her rationale is that no one in her family has ever had breast cancer.       The patient's current medical problems were reviewed.    The following health maintenance schedule was reviewed with the patient and provided in printed form in the after visit summary:   Health Maintenance   Topic Date Due     MAMMOGRAM  1945     DXA SCAN  10/27/2010     INFLUENZA VACCINE RULE BASED (1) 08/01/2016     FALL RISK ASSESSMENT  05/16/2017     ADVANCE DIRECTIVES DISCUSSED WITH PATIENT  02/02/2020     TD 18+ HE  10/07/2020     COLONOSCOPY  07/08/2023     PNEUMOCOCCAL POLYSACCHARIDE VACCINE AGE 65 AND OVER  Completed     PNEUMOCOCCAL CONJUGATE VACCINE FOR ADULTS (PCV13 OR PREVNAR)  Completed     ZOSTER VACCINE  Completed        Subjective:   Chief Complaint: Jesika Paez is an 71 y.o. female here for an Annual Wellness visit.   HPI:    1. Medicare  annual wellness visit, initial  Patient simply here for an annual physical today.  She participated in the annual wellness parameters and everything is fine.  She has no anxiety or depression or dementia or fall risk or dietary issues.    2. Hyperlipemia  We need to recheck lipids today and refill her meds.    3. Osteopenia  She will continue on her medication.  We'll check a vitamin D level today.  We'll order a DEXA bone scan.    4. Macrocytosis  This is likely macrocytosis of aging but will recheck a CBC and B12 levels today.      Review of Systems:    Please see above.  The rest of the review of systems are negative for all systems.    Patient Care Team:  Nima Horn MD as PCP - General (Internal Medicine)     Patient Active Problem List   Diagnosis     Macrocytosis     Hyperlipemia     Hearing Loss     Osteopenia     Past Medical History:   Diagnosis Date     Nicotine Dependence     Created by Conversion      Recent Weight Gain Involuntary     Created by Conversion       Past Surgical History:   Procedure Laterality Date     ME ARVIZU W/O FACETEC FORAMOT/DSKC 1/2 VRT SEG, LUMBAR      Description: Laminectomy Decompressive Up To Two Lumbar Segments;  Recorded: 01/12/2010;  Comments: X2 SEPARATE BACK SURGERIES     ME LIGATE FALLOPIAN TUBE      Description: Tubal Ligation;  Recorded: 01/12/2010;     ME OPEN TREATMENT BIMALLEOLAR ANKLE FRACTURE      Description: Open Treatment Of Bimalleolar Ankle Fracture;  Recorded: 01/12/2010;     ME REMOVAL GALLBLADDER      Description: Cholecystectomy;  Recorded: 01/12/2010;     ME REMOVE TONSILS/ADENOIDS,<13 Y/O      Description: Tonsillectomy With Adenoidectomy;  Recorded: 01/12/2010;     ME TOTAL ABDOM HYSTERECTOMY      Description: Total Abdominal Hysterectomy;  Recorded: 01/12/2010;     ME TOTAL ABDOM HYSTERECTOMY      Description: Hysterectomy;  Recorded: 01/12/2010;      No family history on file.   Social History     Social History     Marital status:       "Spouse name: N/A     Number of children: N/A     Years of education: N/A     Occupational History     Not on file.     Social History Main Topics     Smoking status: Former Smoker     Quit date: 2/2/2012     Smokeless tobacco: Not on file     Alcohol use Not on file     Drug use: Not on file     Sexual activity: Not on file     Other Topics Concern     Not on file     Social History Narrative      Current Outpatient Prescriptions   Medication Sig Dispense Refill     alendronate (FOSAMAX) 70 MG tablet SEE NOTES 12 tablet 3     aspirin 81 mg chewable tablet Chew 81 mg daily.       atorvastatin (LIPITOR) 20 MG tablet TAKE 1 TABLET BY MOUTH EVERY DAY 90 tablet 3     VIT A/VIT C/VIT E/ZINC/COPPER (PRESERVISION AREDS ORAL) Take as directed       No current facility-administered medications for this visit.       Objective:   Vital Signs:   Visit Vitals     /82 (Patient Site: Left Arm, Patient Position: Sitting, Cuff Size: Adult Regular)     Pulse 64     Resp 16     Ht 5' 3\" (1.6 m)     Wt 160 lb (72.6 kg)     LMP  (LMP Unknown)     BMI 28.34 kg/m2        VisionScreening:  No exam data present     PHYSICAL EXAM  EYES: Eyelids, conjunctiva, and sclera were normal. Pupils were normal.  HEAD, EARS, NOSE, MOUTH, AND THROAT: Head and face were normal. Hearing was normal to voice and the ears were normal to exam. Nose appearance was normal and there was no discharge. Oropharynx was normal.  NECK: Neck appearance was normal. There was no cervical adenopathy.  RESPIRATORY: Breathing pattern was normal and the chest moved symmetrically.  Lungs were clear to auscultaion without rales or wheezing.   BREASTS: The patient declines a breast exam and declines a mammogram.  Her rationale is that no one in her family has ever had breast cancer.   CARDIOVASCULAR: Heart rate and rhythm were normal.  S1 and S2 were normal and there were no extra sounds or murmurs.   GASTROINTESTINAL: The abdomen was normal in contour.  Bowel sounds were " present.  No hepatomegaly or splenomegaly. No localized tenderness, rebound or guarding.  MUSCULOSKELETAL: Skeletal configuration was normal and muscle mass was normal for age. Joint appearance was overall normal.  LYMPHATIC: There were no enlarged nodes.  SKIN/HAIR/NAILS: Skin color was normal.  There were no skin lesions.  Hair and nails were normal.  EXTREMITIES: No peripheral edema. DP/PT pulses were normal.  NEUROLOGIC: The patient was alert and oriented to person, place, time, and circumstance. Speech was normal. Cranial nerves were normal. Motor strength was normal for age. The patient was normally coordinated.  PSYCHIATRIC:  Mood and affect were normal and the patient had normal recent and remote memory. The patient's judgment and insight were normal.      Assessment Results 4/11/2017   Activities of Daily Living No help needed   Instrumental Activities of Daily Living No help needed   Get Up and Go Score Less than 12 seconds   Mini Cog Total Score 4     A Mini-Cog score of 0-2 suggests the possibility of dementia, score of 3-5 suggests no dementia    Identified Health Risks:     The patient was provided with written information regarding signs of hearing loss.  Patient's advanced directive was discussed and I am comfortable with the patient's wishes.

## 2021-06-12 NOTE — PROGRESS NOTES
[facility] Follow Up Note    Assessment/Plan:    1. Osteoporosis  Recent bone density showed raised T score of -2.7 and hip T score of -2.0.  She is on Fosamax since 2013 and will continue that.  She denies any falls.  Recent vitamin D level was normal.    2. Serum potassium elevated  Unclear etiology, patient is not on blood pressure medications, sample could have been hemolyzed.  We will repeat her potassium level today.  - Basic Metabolic Panel    Jessica Duggan MD    Chief Complaint:  Chief Complaint   Patient presents with     Establish Care     Change from Dr Horn       History of Present Illness:  Jesika is a 71 y.o. female with. history of previous heavy smoking (currently quit), osteoporosis, hyperlipidemia, decreased hearing, hysterectomy, cholecystectomy, left ankle fracture and fixation surgery, macular degeneration and cataracts who is currently here to establish care.  She denies any complaints or concerns.  She had recent normal annual wellness exam in April 2017.    Patient has been on Fosamax from approximately 2013.  She had bone density test done this year which showed wrist T score of -2.7 and hip T score of -2.0.  She was recommended to continue on Fosamax.  Her recent vitamin D level was normal.    Patient has been through 4 divorces and lost 1 of her sons to suicide.  Currently she lives with her older son and is coping well.  She denies any depression or anxiety.    Patient also has significant history of distant smoking.  She has 30 pack years of smoking and quit 5 years ago.  Currently she walks a mile a day and denies any problems with chest pains or shortness of breath.  She denies any chronic cough.      Patient continues to decline mammograms.She tells me that she does self breast exams regularly.  She had normal colonoscopy in 2013    Review of Systems:  A comprehensive review of systems was performed and was otherwise negative.  Bowels are regular, she was trying  "to lose weight and lost 30 pounds.  She does complain about mild memory problems but nothing significant    PFSH:  Social History: Reviewed  History   Smoking Status     Former Smoker     Years: 30.00     Quit date: 2/2/2012   Smokeless Tobacco     Never Used     Social History     Social History Narrative    Patient has been  4 times.  She lives with her son.  Her younger son committed suicide at the age of 24.       Past History: Reviewed  Current Outpatient Prescriptions   Medication Sig Dispense Refill     alendronate (FOSAMAX) 70 MG tablet SEE NOTES 12 tablet 3     aspirin 81 mg chewable tablet Chew 81 mg daily.       atorvastatin (LIPITOR) 20 MG tablet TAKE 1 TABLET BY MOUTH EVERY DAY 90 tablet 3     VIT A/VIT C/VIT E/ZINC/COPPER (PRESERVISION AREDS ORAL) Take as directed       No current facility-administered medications for this visit.        Family History: Reviewed    Physical Exam:    Vitals:    08/09/17 1106   BP: 118/70   Patient Site: Left Arm   Patient Position: Sitting   Cuff Size: Adult Regular   Pulse: (!) 58   Resp: 16   SpO2: 99%   Weight: 158 lb 4 oz (71.8 kg)   Height: 5' 3\" (1.6 m)     Wt Readings from Last 3 Encounters:   08/09/17 158 lb 4 oz (71.8 kg)   04/11/17 160 lb (72.6 kg)   05/16/16 155 lb (70.3 kg)     Body mass index is 28.03 kg/(m^2).    Constitutional:  Reveals a delightful female, slightly hard of hearing which she does not feel she needs hearing aids at this point.  Vitals:  Per nursing notes.  HEENT:No cervical LAD, no thyromegaly,  conjunctiva is pink, no scleral icterus, TMs are visualized and normal bl, oropharynx is clear, no exudates,   Cardiac:  Regular rate and rhythm,no murmurs, rubs, or gallops.  Legs without edema. Palpation of the radial pulse regular.  Lungs: Clear to auscultation bl.  Respiratory effort normal.  No wheezes or rales.  Abdomen:positive BS, soft, nontender, nondistended.  No hepato-splenomagaly  Rheumatologic: Normal joints and nails of the " hands.  Neurologic:  Cranial nerves II-XII intact.     Psychiatric: affect appropriate, memory intact.   I did ask you to remember 3 words and she did recall of 3 words after 5 minutes without no difficulty.    Data Review:    Analysis and Summary of Old Records (2): Yes    Records Requested (1): No    Other History Summarized (from other people in the room) (2): No    Radiology Tests Summarized (XRAY/CT/MRI/DXA) (1): Yes bone density    Labs Reviewed (1): Yes    Medicine Tests Reviewed (EKG/ECHO/COLONOSCOPY/EGD) (1): Yes colonoscopy    Independent Review of EKG or X-RAY (2): No

## 2021-06-16 NOTE — PROGRESS NOTES
Audiology Report:      History:  Jesika Paez is seen today for comprehensive hearing evaluation. She has a history of difficulty hearing bilaterally for many years. She reports she had her hearing tested about 6 years ago and she was told she was a candidate for hearing aids. Jesika reports that she listens to the television and radio at loud volumes. She is interested in getting a captioned telephone and she would like the paperwork filled out today. She reportedly experiences intermittent tinnitus in both ears. Jesika states she experienced an episode of lightheadedness in the fall of 2017 and she fell. She denies any recent dizziness. She reports her mom had hearing loss. 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 She denies otalgia, otorrhea, aural fullness, and ear surgery.    Results:     Left Ear Right Ear   Otoscopy clear canals clear canals   Pure Tone Audiometry moderate sloping to moderately-severe sensorineural hearing loss moderate sloping to moderately-severe sensorineural hearing loss   Word Recognition excellent excellent   Tympanometry shallow (Type As)  shallow (Type As)     Transducer: Insert earphones and Circumaural headphones    Reliability was good  and there was good  SRT to PTA agreement.      Plan:  Results are discussed in detail.  She should return for retesting in 1-2 years. Jesika is a candidate for amplification. She is provided pricing information and the [place] [employer] brochure about hearing aids. The [employer] paperwork is filled out and faxed for her. She is provided clinic contact information if she decides to pursue hearing aids.     Please see audiogram under  media  and  audiogram  in the patient s chart.     Tomasz Smalls, CCC-A  [place] Licensed Audiologist #[id number]

## 2021-06-16 NOTE — TELEPHONE ENCOUNTER
RN cannot approve Refill Request    RN can NOT refill this medication PCP messaged that patient is overdue for Office Visit. Last office visit: Visit date not found Last Physical: 7/5/2019 Last MTM visit: Visit date not found Last visit same specialty: Visit date not found.  Next visit within 3 mo: Visit date not found  Next physical within 3 mo: Visit date not found      Flory Dubon, [facility] Triage/Med Refill 4/17/2021    Requested Prescriptions   Pending Prescriptions Disp Refills     atorvastatin (LIPITOR) 20 MG tablet [Pharmacy Med Name: ATORVASTATIN CALCIUM 20MG TABS] 90 tablet 3     Sig: TAKE ONE TABLET BY MOUTH EVERY DAY       Statins Refill Protocol (Hmg CoA Reductase Inhibitors) Failed - 4/17/2021  4:23 AM        Failed - PCP or prescribing provider visit in past 12 months      Last office visit with prescriber/PCP: Visit date not found OR same dept: Visit date not found OR same specialty: Visit date not found  Last physical: 7/5/2019 Last MTM visit: Visit date not found   Next visit within 3 mo: Visit date not found  Next physical within 3 mo: Visit date not found  Prescriber OR PCP: Abrahan Diez MD  Last diagnosis associated with med order: 1. Hyperlipidemia  - atorvastatin (LIPITOR) 20 MG tablet [Pharmacy Med Name: ATORVASTATIN CALCIUM 20MG TABS]; TAKE ONE TABLET BY MOUTH EVERY DAY  Dispense: 90 tablet; Refill: 3    If protocol passes may refill for 12 months if within 3 months of last provider visit (or a total of 15 months).

## 2021-06-17 ENCOUNTER — COMMUNICATION - HEALTHEAST (OUTPATIENT)
Dept: AUDIOLOGY | Facility: CLINIC | Age: 76
End: 2021-06-17

## 2021-06-17 NOTE — TELEPHONE ENCOUNTER
RN cannot approve Refill Request    RN can NOT refill this medication PCP messaged that patient is overdue for Office Visit. Last office visit: Visit date not found Last Physical: 7/5/2019 Last MTM visit: Visit date not found Last visit same specialty: Visit date not found.  Next visit within 3 mo: Visit date not found  Next physical within 3 mo: Visit date not found      Flory Dubon, [facility] Triage/Med Refill 5/13/2021    Requested Prescriptions   Pending Prescriptions Disp Refills     alendronate (FOSAMAX) 70 MG tablet [Pharmacy Med Name: ALENDRONATE SODIUM 70MG TABS] 12 tablet 3     Sig: TAKE 1 TAB. ONCE WEEKLY WITH 8 OZ. OF WATER. DO NOT LIE DOWN, EAT, DRINK, OR TAKE OTHER MEDS FOR 30 MIN.       Biphosphonates Refill Protocol Failed - 5/13/2021  4:37 AM        Failed - PCP or prescribing provider visit in last 12 months     Last office visit with prescriber/PCP: Visit date not found OR same dept: Visit date not found OR same specialty: Visit date not found  Last physical: 7/5/2019 Last MTM visit: Visit date not found   Next visit within 3 mo: Visit date not found  Next physical within 3 mo: Visit date not found  Prescriber OR PCP: Abrahan Diez MD  Last diagnosis associated with med order: 1. Osteoporosis  - alendronate (FOSAMAX) 70 MG tablet [Pharmacy Med Name: ALENDRONATE SODIUM 70MG TABS]; TAKE 1 TAB. ONCE WEEKLY WITH 8 OZ. OF WATER. DO NOT LIE DOWN, EAT, DRINK, OR TAKE OTHER MEDS FOR 30 MIN.  Dispense: 12 tablet; Refill: 3    If protocol passes may refill for 12 months if within 3 months of last provider visit (or a total of 15 months).             Failed - Serum creatinine in last 12 months     Creatinine   Date Value Ref Range Status   07/01/2019 0.73 0.60 - 1.10 mg/dL Final

## 2021-06-17 NOTE — PROGRESS NOTES
Assessment and Plan:       1. Medicare annual wellness visit, subsequent  We will obtain fasting blood work today.  Mammogram was recommended by patient refused.  She also would be a candidate for lung cancer screening with low-dose CT.  That was discussed and she wants to think about it.  We discussed new shingles vaccine and that was ordered.  - varicella-zoster gE-AS01B, PF, 50 mcg/0.5 mL SusR; Inject 0.5 mL into the shoulder, thigh, or buttocks once for 1 dose.  Dispense: 1 each; Refill: 0  - varicella-zoster gE-AS01B, PF, 50 mcg/0.5 mL SusR; Inject 0.5 mL into the shoulder, thigh, or buttocks once for 1 dose.  Dispense: 1 each; Refill: 0      2. Left flank pain, chronic  On and off.  Somewhat positional in nature and could be secondary to musculoskeletal problem but due to her history of smoking I recommended ultrasound of her kidneys.  - Urinalysis-UC if Indicated  - US Kidney Bilateral; Future  - HM2(CBC w/o Differential)    3. Hyperlipidemia, unspecified hyperlipidemia type  She is on Lipitor and aspirin  - Lipid Cascade  - Comprehensive Metabolic Panel  - Thyroid Stimulating Hormone (TSH)    4. Vitamin D deficiency  She takes 4000+ units of vitamin D  - Vitamin D, Total (25-Hydroxy)      The patient's current medical problems were reviewed.    The following health maintenance schedule was reviewed with the patient and provided in printed form in the after visit summary:   Health Maintenance   Topic Date Due     MAMMOGRAM  1945     FALL RISK ASSESSMENT  08/09/2018     DXA SCAN  04/11/2019     TD 18+ HE  10/07/2020     ADVANCE DIRECTIVES DISCUSSED WITH PATIENT  04/11/2022     COLONOSCOPY  07/08/2023     PNEUMOCOCCAL POLYSACCHARIDE VACCINE AGE 65 AND OVER  Completed     INFLUENZA VACCINE RULE BASED  Completed     PNEUMOCOCCAL CONJUGATE VACCINE FOR ADULTS (PCV13 OR PREVNAR)  Completed     ZOSTER VACCINE  Completed        Subjective:   Chief Complaint: Jesika Paez is an 72 y.o. female here for an  Annual Wellness visit.     HPI: Jesika has history of previous heavy smoking (currently quit), osteoporosis, hyperlipidemia, decreased hearing, hysterectomy, cholecystectomy, left ankle fracture and fixation surgery, macular degeneration and cataracts who is currently here for annual wellness exam.    She continues to walk 30 minutes a day.  She denies any chest pains palpitations or dizziness.    She does complain about periodic left flank and lateral thorax pain.  It is worse when she is standing for too long or sitting for too long.  It is better with lying down.  She does have chronic low back pain and 2 surgeries but no SI joint tenderness.  Because her significant history of smoking we discussed obtaining renal ultrasound and urinalysis.    Patient does have osteoporosis in her wrist and currently is on osteoporosis medication.  She takes vitamin D 4000 units a day as well as calcium and vitamin D supplement.    Patient has history of 30 pack plus year of smoking and quitting less than 15 years ago.  Currently she denies any shortness of breath or cough or any limitations due to breathing.  On exam her lungs are clear but breath sounds are decreased.  Discussed that she would qualify for lung cancer screening with his CT scan.  Patient wanted to think about it.    Patient reports having colonoscopy in 2013 will need to obtain records.  She continues to refuse mammograms.  Her breast tissue is dense on exam.  I do not appreciate any masses on breast exam.  Once again mammogram was recommended.    Patient has hyperlipidemia.  She is on Lipitor.  She is also on aspirin.    Review of Systems: Please see above.  The rest of the review of systems are negative for all systems.  She does have hearing problems and will be getting her hearing aids next week.  She did have 2 falls last year.  First she fell because she felt dizzy because she skipped breakfast.  Second time she fell in the winter due to sleeping on ice.   No broken bones.    Patient Care Team:  Jessica Duggan MD as PCP - General (Internal Medicine)  Karen Coronel     Patient Active Problem List   Diagnosis     Hyperlipemia     Hearing Loss     Osteopenia     Past Medical History:   Diagnosis Date     Nicotine Dependence     Created by Conversion      Recent Weight Gain Involuntary     Created by Conversion       Past Surgical History:   Procedure Laterality Date     ANKLE FRACTURE SURGERY       CHOLECYSTECTOMY       FRACTURE SURGERY       HYSTERECTOMY       UT ARVIZU W/O FACETEC FORAMOT/DSKC 1/2 VRT SEG, LUMBAR      Description: Laminectomy Decompressive Up To Two Lumbar Segments;  Recorded: 01/12/2010;  Comments: X2 SEPARATE BACK SURGERIES     UT LIGATE FALLOPIAN TUBE      Description: Tubal Ligation;  Recorded: 01/12/2010;     UT OPEN TREATMENT BIMALLEOLAR ANKLE FRACTURE      Description: Open Treatment Of Bimalleolar Ankle Fracture;  Recorded: 01/12/2010;     UT REMOVAL GALLBLADDER      Description: Cholecystectomy;  Recorded: 01/12/2010;     UT REMOVE TONSILS/ADENOIDS,<11 Y/O      Description: Tonsillectomy With Adenoidectomy;  Recorded: 01/12/2010;     UT TOTAL ABDOM HYSTERECTOMY      Description: Total Abdominal Hysterectomy;  Recorded: 01/12/2010;     UT TOTAL ABDOM HYSTERECTOMY      Description: Hysterectomy;  Recorded: 01/12/2010;      Family History   Problem Relation Age of Onset     Dementia Mother      Hearing loss Mother      Heart disease Father      Hyperlipidemia Father      Hypertension Father      Seizures Sister      Pneumonia Brother      Arthritis Brother      Chronic infections Brother      Suicidality Son       Social History     Social History     Marital status:      Spouse name: N/A     Number of children: N/A     Years of education: N/A     Occupational History     Not on file.     Social History Main Topics     Smoking status: Former Smoker     Years: 30.00     Quit date: 2/2/2012     Smokeless tobacco: Never Used      "Alcohol use No     Drug use: No     Sexual activity: Not on file     Other Topics Concern     Not on file     Social History Narrative    Patient has been  4 times.  She lives with her son.  Her younger son committed suicide at the age of 24.      Current Outpatient Prescriptions   Medication Sig Dispense Refill     alendronate (FOSAMAX) 70 MG tablet SEE NOTES 12 tablet 3     aspirin 81 mg chewable tablet Chew 81 mg daily.       atorvastatin (LIPITOR) 20 MG tablet TAKE 1 TABLET BY MOUTH EVERY DAY 90 tablet 3     calcium carbonate-vitamin D3 (CALCIUM 600 WITH VITAMIN D3) 600 mg(1,500mg) -200 unit per tablet Take 1 tablet by mouth daily.  0     cholecalciferol, vitamin D3, 2,000 unit Tab 2 tabs a day 100 each 3     VIT A/VIT C/VIT E/ZINC/COPPER (PRESERVISION AREDS ORAL) Take as directed       No current facility-administered medications for this visit.       Objective:   Vital Signs:   Visit Vitals     /68 (Patient Site: Left Arm, Patient Position: Sitting, Cuff Size: Adult Regular)     Pulse (!) 58     Resp 16     Ht 5' 2.75\" (1.594 m)     Wt 157 lb 9 oz (71.5 kg)     LMP  (LMP Unknown)     SpO2 97%     BMI 28.13 kg/m2        VisionScreening:   Patient wears glasses and denies any worsening or for vision.  Last eye exam was a year ago and I recommended that she has it done again.  She does have history of macular degeneration and cataracts.    PHYSICAL EXAM  General: well appearing female, alert and oriented x3  EYES: Eyelids, conjunctiva, and sclera were normal. Pupils were normal.   HEAD, EARS, NOSE, MOUTH, AND THROAT: no cervical LAD, no thyromegaly or nodules appreciated. TMs are visualized and normal, oropharynx is clear.  RESPIRATORY: respirations non labored, CTA bl, no wheezes, rales, no forced expiratory wheezing.  Decreased breath sounds bilaterally.  CARDIOVASCULAR: Heart rate and rhythm were normal. No murmurs, rubs,gallops. There was no peripheral edema. No carotid " bruits.  GASTROINTESTINAL: Positive bowel sounds, abdomen is soft, non tender, non distended.     MUSCULOSKELETAL: Muscle mass was normal for age. No joint synovitis or deformity.  LYMPHATIC: There were no enlarged nodes palpable.  SKIN/HAIR/NAILS: Skin color was normal.  No rashes.  Possible seborrheic keratoses noted.  NEUROLOGIC: The patient was alert and oriented.  Speech was normal.  There is no facial asymmetry.   PSYCHIATRIC:  Mood and affect were normal.         Assessment Results 5/7/2018   Activities of Daily Living No help needed   Instrumental Activities of Daily Living No help needed   Get Up and Go Score Less than 12 seconds   Mini Cog Total Score 4   Some recent data might be hidden     A Mini-Cog score of 0-2 suggests the possibility of dementia, score of 3-5 suggests no dementia    Identified Health Risks:     The patient was counseled and encouraged to consider modifying their diet and eating habits. She was provided with information on recommended healthy diet options.  The patient was provided with written information regarding signs of hearing loss.  She is at risk for falling and has been provided with information to reduce the risk of falling at home.  Information regarding advance directives (living [place]), including where she can download the appropriate form, was provided to the patient via the AVS.

## 2021-06-17 NOTE — PATIENT INSTRUCTIONS - HE
Patient Instructions by Abrahan Diez MD at 7/1/2019  9:00 AM     Author: Abrahan Diez MD Service: -- Author Type: Physician    Filed: 7/1/2019 10:06 AM Encounter Date: 7/1/2019 Status: Signed    : Abrahan Diez MD (Physician)         Patient Education   Urinary Incontinence, Female (Adult)  Urinary incontinence means loss of control of the bladder. This problem affects many women, especially as they get older. If you have incontinence, you may be embarrassed to ask for help. But know that this problem can be treated.  Types of Incontinence  There are different types of incontinence. Two of the main types are described here. You can have more than one type.    Stress incontinence. With this type, urine leaks when pressure (stress) is put on the bladder. This may happen when you cough, sneeze, or laugh. Stress incontinence most often occurs because the pelvic floor muscles that support the bladder and urethra are weak. This can happen after pregnancy and vaginal childbirth or a hysterectomy. It can also be due to excess body weight or hormone changes.    Urge incontinence (also called overactive bladder). With this type, a sudden urge to urinate is felt often. This may happen even though there may not be much urine in the bladder. The need to urinate often during the night is common. Urge incontinence most often occurs because of bladder spasms. This may be due to bladder irritation or infection. Damage to bladder nerves or pelvic muscles, constipation, and certain medicines can also lead to urge incontinence.  Treatment of urinary incontinence depends on the cause. Further evaluation is needed to find the type you have. This will likely include an exam and certain tests. Based on the results, you and your healthcare provider can then plan treatment. Until a diagnosis is made, the home care tips below can help relieve symptoms.  Home care    Do pelvic floor muscle exercises, if they are  prescribed. The pelvic floor muscles help support the bladder and urethra. Many women find that their symptoms improve when doing special exercises that strengthen these muscles. To do the exercises contract the muscles you would use to stop your stream of urine, but do this when youre not urinating. Hold for 10 seconds, then relax. Repeat 10 to 20 times in a row, at least 3 times a day. Your provider may give you other instructions for how to do the exercises and how often.    Keep a bladder diary. This helps track how often and how much you urinate over a set period of time. Bring this diary with you to your next visit with the provider. The information can help your provider learn more about your bladder problem.    Lose weight, if advised to by your provider. Excess weight puts pressure on the bladder. Your provider can help you create a weight-loss plan thats right for you. This may include exercising more and making certain diet changes.    Don't consume foods and drinks that may irritate the bladder. These can include alcohol and caffeinated drinks.    Quit smoking. Smoking and other tobacco use can lead to chronic cough that strains the pelvic floor muscles. Smoking may also damage the bladder and urethra. Talk with your provider about treatments or methods you can use to quit smoking.    If drinking large amounts of fluid causes you to have symptoms, you may be advised to limit your fluid intake. You may also be advised to drink most of your fluids during the day and to limit fluids at night.    If youre worried about urine leakage or accidents, you may wear absorbent pads to catch urine. Change the pads often. This helps reduce discomfort. It may also reduce the risk of skin or bladder infections.  Follow-up care  Follow up with your healthcare provider, or as directed. It may take some to find the right treatment for your problem. Your treatment plan may include special therapies or medicines. Certain  procedures or surgery may also be options. Be sure to discuss any questions you have with your provider.  When to seek medical advice  Call the healthcare provider right away if any of these occur:    Fever of 100.4 F (38 C) or higher, or as directed by your provider    Bladder pain or fullness    Abdominal swelling    Nausea or vomiting    Back pain    Weakness, dizziness or fainting  Date Last Reviewed: 10/1/2017    4753-9269 [employer]. [address] [place] [address], [place] [id number]. All rights reserved. This information is not intended as a substitute for professional medical care. Always follow your healthcare professional's instructions.     Patient Education   Understanding Advance Care Planning  Advance care planning is the process of deciding ones own future medical care. It helps ensure that if you cant speak for yourself, your wishes can still be carried out. The plan is a series of legal documents that note a persons wishes. The documents vary by state. Advance care planning may be done when a person has a serious illness that is expected to get worse. It may be done before major surgery. And it can help you and your family be prepared in case of a major illness or injury. Advance care planning helps with making decisions at these times.       A health care proxy is a person who acts as the voice of a patient when the patient cant speak for himself or herself. The name of this role varies by state. It may be called a Durable Medical Power of  or Durable Power of  for Healthcare. It may be called an agent, surrogate, or advocate. Or it may be called a representative or decision maker. It is an official duty that is identified by a legal document. The document also varies by state.    Why Is Advance Care Planning Important?  If a person communicates their healthcare wishes:    They will be given medical care that matches their values and goals.    Their family members will not be  forced to make decisions in a crisis with no guidance.  Creating a Plan  Making an advance care plan is often done in 3 steps:    Thinking about ones wishes. To create an advance care plan, you should think about what kind of medical treatment you would want if you lose the ability to communicate. Are there any situations in which you would refuse or stop treatment? Are there therapies you would want or not want? And whom do you want to make decisions for you? There are many places to learn more about how to plan for your care. Ask your doctor or  for resources.    Picking a health care proxy. This means choosing a trusted person to speak for you only when you cant speak for yourself. When you cannot make medical decisions, your proxy makes sure the instructions in your advance care plan are followed. A proxy does not make decisions based on his or her own opinions. They must put aside those opinions and values if needed, and carry out your wishes.    Filling out the legal documents. There are several kinds of legal documents for advance care planning. Each one tells health care providers your wishes. The documents may vary by state. They must be signed and may need to be witnessed or notarized. You can cancel or change them whenever you wish. Depending on your state, the documents may include a Healthcare Proxy form, Living Will, Durable Medical Power of , Advance Directive, or others.  The Familys Role  The best help a family can give is to support their loved ones wishes. Open and honest communication is vital. Family should express any concerns they have about the patients choices while the patient can still make decisions.    1411-8323 [employer]. [address] [place] [address], [place] [address]. All rights reserved. This information is not intended as a substitute for professional medical care. Always follow your healthcare professional's instructions.         Also, Honoring  [facility] offers a free, downloadable health care directive that allows you to share your treatment choices and personal preferences if you cannot communicate your wishes. It also allows you to appoint another person (called a health care agent) to make health care decisions if you are unable to do so. You can download an advance directive by going here: [email or web address]     Patient Education   Personalized Prevention Plan  You are due for the preventive services outlined below.  Your care team is available to assist you in scheduling these services.  If you have already completed any of these items, please share that information with your care team to update in your medical record.  Health Maintenance   Topic Date Due   ? MAMMOGRAM  1945   ? ZOSTER VACCINES (3 of 3) 09/11/2018   ? DXA SCAN  04/11/2019   ? FALL RISK ASSESSMENT  05/07/2019   ? INFLUENZA VACCINE RULE BASED (1) 08/01/2019   ? TD 18+ HE  10/07/2020   ? ADVANCE DIRECTIVES DISCUSSED WITH PATIENT  04/11/2022   ? COLONOSCOPY  07/08/2023   ? PNEUMOCOCCAL POLYSACCHARIDE VACCINE AGE 65 AND OVER  Completed   ? PNEUMOCOCCAL CONJUGATE VACCINE FOR ADULTS (PCV13 OR PREVNAR)  Completed

## 2021-06-17 NOTE — PROGRESS NOTES
Hearing Aid Evaluation:    Referring Physician: Dr. Duggan     History: The patient has a history of mild sloping to moderately-severe sensorineural hearing loss in the left ear and mild sloping to moderate sensorineural hearing loss in the right ear. Jesika reports she spends most of her time in quiet settings. She states she has macular degeneration and cataracts. Jesika reports that she typically watches television with TV ear headphones and listens to the radio with headphones that cover her ear canals.    Procedure: Hearing aid styles, technology levels, trial period and realistic expectations are discussed in detail.  The patient is interested in pursuing -in-the-ear hearing devices, in both ears. Bilateral Phonak [employer] ASeo B70-R devices are selected.  The hearing aids will be ordered in color P4 with size 1 standard receivers. The devices will be fit with power domes. The patient is set up with a hearing aid fitting appointment.    Plan:  Bilateral hearing aids will be ordered when a purchase order number has been obtained. The hearing aid contract is signed, and the patient is provided a copy along with the [facility] brochure.   The patient should call this clinic with any questions or concerns. The patient verbalized understanding and is in agreement with this plan.    Tomasz Smalls, CCC-A  [place] Licensed Audiologist #[id number]

## 2021-06-18 NOTE — PROGRESS NOTES
Hearing Aid Check    Jesika Paez returns today for a hearing aid check.  She reports that she has a difficult time hearing with her hearing aids because she hears too much background noise. Jesika states that she worries she doesn't insert the hearing aids into her ears all the way and the aids will fall out. She reports that the right hearing aid and her glasses cause some pain at the top of her ear. She reports she hasn't changed the wax traps on the hearing aids yet. Jesika states that she has trouble hearing when she uses her home phone.    Otoscopy:  clear canals in both ears  Visual inspection: Both devices are in good working condition.  Action: The right hearing aid appears to make an indent at the top of Jesika's ear, but there is no redness. The  length looks appropriate so it is kept the same. The gain for soft sounds is decreased by 6 dB and occlusion manager is set to weak to help reduce background noise. A pair of cShell earmolds is recommended for retention and comfort. Jesika would like to pursue a pair of cShell earmolds. Bilateral earmold impressions are taken without incidence. The order will be sent to [email or web address] when a purchase order number is received. Jesika practices changing the hearing aid wax trap today. She is shown where to place the telephone  when she answers the phone.     She reports subjective improvement with today s changes. She will follow up in 1 month for a hearing aid check and earmold fitting.    Tomasz Smalls, CCC-A  [place] Licensed Audiologist #[id number]

## 2021-06-18 NOTE — PROGRESS NOTES
Hearing Aid Fitting    The patient was seen today for a hearing aid fitting. When she comes in today she reports she recently got an iPhone so she will now be able to stream music to her new hearing aids. Jesika is told that the hearing aids that she chose do not have the capability to stream directly to the hearing aids from an iPhone. She is told that she would be able to exchange the Phonak hearing aids for a pair of ReSound or Gaurang hearing aids if she would like streaming capabilities. At this point she would like to be fit with the Phonak aids. She was fit with bilateral Phonak Audeo B70-R SAW BTE devices. The hearing aids are fit with size 1 standard receivers, small power domes, and retention cords. Real ear measurements revealed appropriate audibility and output for NAL-NL2 gain targets. The overall gain of the hearing aids is decreased from 100% to 80% for patient comfort.    Hearing aid:  : Phonak  Devices:  Audeo B70-R  Style:  SAW BTE  Serial numbers:  R: [id number], L: [id number]  Batteries:  rechargeable  Warranty expiration:  8/6/2021    Otoscopy reveals no occlusions, bilaterally. The patient reports satisfaction with the fit and sound quality of the instruments.  Use, care, trial period and realistic expectations were reviewed in detail.  Push button is set to turn the hearing aid on and off with a long press and change the volume with a short press.  She is able to demonstrate independent manipulation of the instruments with insertion/removal.  She is given written instruction on use, care, and warranty.  She sets up a follow up appointment in 1 month.    The patient verbalized understanding and is in agreement with this plan.    Tomasz Smalls, CCC-A  [place] Licensed Audiologist #[id number]

## 2021-06-19 NOTE — PROGRESS NOTES
Hearing Aid Check and Earmold Fitting    Jesika Paez returns today for a hearing aid check and earmold fitting.  She reports she hears too much background noise when using her hearing aids in noisy settings.    Otoscopy:  clear canals in both ears  Data Logging: appropriate use  Visual inspection: Both devices are in good working condition.  Action: Jesika is fit with a pair of Phonak cShell canal lock earmolds today. The new earmolds appear to fit well and she reports they are comfortable. The feedback test is run on the hearing aids. The hearing aid programming is verified using real-ear measures and the devices are able to meet NAL-NL2 gain targets appropriately. The overall gain of the devices is reduced to 90% for patient comfort. Gain for soft sounds is decreased by 4 dB and low frequency gain is decreased by 3 dB to help reduce background noise in noisy settings. Jesika is able to insert and remove the hearing aids from her ears independently. She keeps the old receivers and power domes in case she ever needs them later.     She reports subjective improvement with today s changes. She will follow up in 1-2 years for a hearing test or as needed for hearing aid check visits.    Tomasz Smalls, CCC-A  [place] Licensed Audiologist #[id number]

## 2021-06-20 NOTE — LETTER
Letter by Alena Montejo RN at      Author: Alena Montejo RN Service: -- Author Type: --    Filed:  Encounter Date: 9/24/2020 Status: (Other)         [facility]  [address]  [place] [id number]      09/24/20      Dear Jesika    Its time for your yearly exam to check for lung cancer.   Please call your primary care doctor to schedule a brief visit so your Low Dose CT scan can be ordered at the time of that appointment. Your scan needs to be done within 30 days of your office visit.      You should have a yearly exam if:  Youre age 55 to 80 (55 to 77, if youre on Medicare)  Youve smoked a pack a day for at least 30 years (or 2 packs a day for at least 15 years)  If you no longer smoke, its been less than 15 years since you quit     These exams work best when done every year. They are good at finding lung cancer early, but they cant find all lung cancers. If you develop any symptoms (shortness of breath, chest pain, coughing up blood), call your doctor right away.     If you would like help to quit smoking, please talk with your doctor during your next visit. Or, call QUITPLAN at [phone].        Sincerely,   Nohemi Garcia ([facility]) Lung Cancer Screening Program

## 2021-06-21 NOTE — LETTER
Letter by Alena Montejo RN at      Author: Alena Montejo RN Service: -- Author Type: --    Filed:  Encounter Date: 10/22/2020 Status: (Other)       Jesika FALCON [facility]  [address]  [place] [address]      10/22/20      Dear Jesika,    Your annual lung cancer screening scan is overdue. We have attempted to reach you without success. If you remain interested please call your primary care provider to schedule an office visit so they can do the screening.  We will make no further attempts to call you to schedule at this point if we do not hear from you and assume you are no longer interested in the screening.     Sincerely,  [facility] [employer] [facility]

## 2021-06-22 NOTE — TELEPHONE ENCOUNTER
Refill Approved    Rx renewed per Medication Renewal Policy. Medication was last renewed on 2/5/18.    Loyda Hsu, Care Connection Triage/Med Refill 1/7/2019     Requested Prescriptions   Pending Prescriptions Disp Refills     alendronate (FOSAMAX) 70 MG tablet 12 tablet 3     Sig: SEE NOTES    Biphosphonates Refill Protocol Passed - 1/7/2019  3:18 PM       Passed - PCP or prescribing provider visit in last 12 months    Last office visit with prescriber/PCP: 8/9/2017 Jessica Duggan MD OR same dept: Visit date not found OR same specialty: 8/9/2017 Jessica Duggan MD  Last physical: 5/7/2018 Last MTM visit: Visit date not found   Next visit within 3 mo: Visit date not found  Next physical within 3 mo: Visit date not found  Prescriber OR PCP: Jessica Duggan MD  Last diagnosis associated with med order: 1. Osteoporosis  - alendronate (FOSAMAX) 70 MG tablet; SEE NOTES  Dispense: 12 tablet; Refill: 3    If protocol passes may refill for 12 months if within 3 months of last provider visit (or a total of 15 months).            Passed - Serum creatinine in last 12 months    Creatinine   Date Value Ref Range Status   05/07/2018 0.71 0.60 - 1.10 mg/dL Final

## 2021-06-23 NOTE — TELEPHONE ENCOUNTER
RN declined refill request for Fosamax. Rx last filled on 1/7/2019 for qty 12 refill 0. Pharmacy informed.    Baljit Mcdaniel RN Care Connection Triage

## 2021-06-26 ENCOUNTER — HEALTH MAINTENANCE LETTER (OUTPATIENT)
Age: 76
End: 2021-06-26

## 2021-06-26 NOTE — TELEPHONE ENCOUNTER
Called patient to let her know hearing aids were checked after they were dropped off at the . Neither hearing aid is working (battery is dead and will not charge, hearing will not turn on). Let patient know they need to be sent to [employer] for repair, but they are under warranty so there is no charge for the repair. Let patient know it will like be a couple weeks before they are ready. She would like them mailed to her when they are back from repair. Confirmed patient's address. She would like a phone call notification prior to them being sent. (Address and phone number are on hearing aid case in drawer).    Tomasz Cabrera, [facility]  Clinical Audiologist  [place] #[id number]

## 2021-07-06 ENCOUNTER — COMMUNICATION - HEALTHEAST (OUTPATIENT)
Dept: AUDIOLOGY | Facility: CLINIC | Age: 76
End: 2021-07-06

## 2021-07-06 NOTE — TELEPHONE ENCOUNTER
Telephone Encounter by Nancy Loving AuD at 7/6/2021  5:15 PM     Author: Nancy Loving AuD Service: -- Author Type: Audiologist    Filed: 7/6/2021  5:16 PM Encounter Date: 7/6/2021 Status: Signed    : Nancy Loving AuD (Audiologist)       Spoke with Jesika and informed her that both of her hearing aids have been received from [employer] and are going out in the mail tomorrow. Listening check revealed good level and sound quality. Both hearing aids were charged. No charge; in warranty repair.     Tomasz Montilla, CCC-A  Clinical Audiologist   [place] #[id number]

## 2021-08-05 DIAGNOSIS — M81.0 OSTEOPOROSIS, UNSPECIFIED OSTEOPOROSIS TYPE, UNSPECIFIED PATHOLOGICAL FRACTURE PRESENCE: ICD-10-CM

## 2021-08-09 RX ORDER — ALENDRONATE SODIUM 70 MG/1
TABLET ORAL
Qty: 12 TABLET | Refills: 0 | Status: SHIPPED | OUTPATIENT
Start: 2021-08-09 | End: 2021-10-31

## 2021-08-09 NOTE — TELEPHONE ENCOUNTER
Recvd call from pt/Jesika, she was calling to see what the status is on this RX. She will wait for Dr Abrahan Diez to review and send to pharmacy

## 2021-08-09 NOTE — TELEPHONE ENCOUNTER
"alendronate (FOSAMAX) 70 MG tablet 12 tablet 0 5/13/2021  No   Sig: TAKE 1 TAB. ONCE WEEKLY WITH 8 OZ. OF WATER. DO NOT LIE DOWN, EAT, DRINK, OR TAKE OTHER MEDS FOR 30 MIN.   Sent to pharmacy as: alendronate 70 mg tablet (FOSAMAX)   E-Prescribing Status: Receipt confirmed by pharmacy (5/13/2021  4:22 PM CDT)     Routing refill request to provider for review/approval because:  Labs not current:  No serum creatinine on file in 2 yrs  Patient needs to be seen because it has been more than 1 year since last office visit.  Last Dexa Scan was 07/2019-over 2 yrs ago.    Last Written Prescription Date:  05/13/2021  Last Fill Quantity: 12,  # refills: 0   Last office visit provider:  07/05/2019 with Dr Diez.    Requested Prescriptions   Pending Prescriptions Disp Refills     alendronate (FOSAMAX) 70 MG tablet [Pharmacy Med Name: ALENDRONATE SODIUM 70MG TABS] 12 tablet 0     Sig: TAKE 1 TABLET BY MOUTH ONCE WEEKLY WITH 8OZ WATER. DO NOT LIE DOWN, EAT, DRINK, OR TAKE OTHER MEDS FOR 30 MIN       Bisphosphonates Failed - 8/5/2021  4:37 AM        Failed - Recent (12 mo) or future (30 days) visit within the authorizing provider's specialty     Patient has had an office visit with the authorizing provider or a provider within the authorizing providers department within the previous 12 mos or has a future within next 30 days. See \"Patient Info\" tab in inbasket, or \"Choose Columns\" in Meds & Orders section of the refill encounter.              Failed - Dexa on file within past 2 years     Please review last Dexa result.           Failed - Normal serum creatinine on file within past 12 months     Recent Labs   Lab Test 07/01/19  1030   CR 0.73       Ok to refill medication if creatinine is low          Passed - Medication is active on med list        Passed - Patient is age 18 or older             Millicent Conroy 08/08/21 9:32 PM  "

## 2021-08-17 ENCOUNTER — TRANSFERRED RECORDS (OUTPATIENT)
Dept: HEALTH INFORMATION MANAGEMENT | Facility: CLINIC | Age: 76
End: 2021-08-17

## 2021-08-23 ENCOUNTER — OFFICE VISIT (OUTPATIENT)
Dept: FAMILY MEDICINE | Facility: CLINIC | Age: 76
End: 2021-08-23
Payer: COMMERCIAL

## 2021-08-23 VITALS
HEART RATE: 60 BPM | SYSTOLIC BLOOD PRESSURE: 128 MMHG | DIASTOLIC BLOOD PRESSURE: 72 MMHG | HEIGHT: 63 IN | OXYGEN SATURATION: 98 % | BODY MASS INDEX: 26.4 KG/M2 | WEIGHT: 149 LBS

## 2021-08-23 DIAGNOSIS — E78.5 HYPERLIPIDEMIA, UNSPECIFIED HYPERLIPIDEMIA TYPE: ICD-10-CM

## 2021-08-23 DIAGNOSIS — Z00.00 ROUTINE HISTORY AND PHYSICAL EXAMINATION OF ADULT: Primary | ICD-10-CM

## 2021-08-23 DIAGNOSIS — H35.30 MACULAR DEGENERATION (SENILE) OF RETINA: ICD-10-CM

## 2021-08-23 DIAGNOSIS — M81.0 OSTEOPOROSIS, UNSPECIFIED OSTEOPOROSIS TYPE, UNSPECIFIED PATHOLOGICAL FRACTURE PRESENCE: ICD-10-CM

## 2021-08-23 LAB
ALBUMIN SERPL-MCNC: 4.4 G/DL (ref 3.5–5)
ALP SERPL-CCNC: 39 U/L (ref 45–120)
ALT SERPL W P-5'-P-CCNC: 27 U/L (ref 0–45)
ANION GAP SERPL CALCULATED.3IONS-SCNC: 13 MMOL/L (ref 5–18)
AST SERPL W P-5'-P-CCNC: 27 U/L (ref 0–40)
BILIRUB SERPL-MCNC: 1.5 MG/DL (ref 0–1)
BUN SERPL-MCNC: 14 MG/DL (ref 8–28)
CALCIUM SERPL-MCNC: 9.7 MG/DL (ref 8.5–10.5)
CHLORIDE BLD-SCNC: 107 MMOL/L (ref 98–107)
CHOLEST SERPL-MCNC: 179 MG/DL
CO2 SERPL-SCNC: 25 MMOL/L (ref 22–31)
CREAT SERPL-MCNC: 0.77 MG/DL (ref 0.6–1.1)
FASTING STATUS PATIENT QL REPORTED: YES
GFR SERPL CREATININE-BSD FRML MDRD: 76 ML/MIN/1.73M2
GLUCOSE BLD-MCNC: 86 MG/DL (ref 70–125)
HDLC SERPL-MCNC: 74 MG/DL
LDLC SERPL CALC-MCNC: 88 MG/DL
POTASSIUM BLD-SCNC: 4.4 MMOL/L (ref 3.5–5)
PROT SERPL-MCNC: 7 G/DL (ref 6–8)
SODIUM SERPL-SCNC: 145 MMOL/L (ref 136–145)
TRIGL SERPL-MCNC: 83 MG/DL

## 2021-08-23 PROCEDURE — 36415 COLL VENOUS BLD VENIPUNCTURE: CPT | Performed by: FAMILY MEDICINE

## 2021-08-23 PROCEDURE — 99397 PER PM REEVAL EST PAT 65+ YR: CPT | Performed by: FAMILY MEDICINE

## 2021-08-23 PROCEDURE — 80053 COMPREHEN METABOLIC PANEL: CPT | Performed by: FAMILY MEDICINE

## 2021-08-23 PROCEDURE — 80061 LIPID PANEL: CPT | Performed by: FAMILY MEDICINE

## 2021-08-23 RX ORDER — MAG HYDROX/ALUMINUM HYD/SIMETH 400-400-40
450 SUSPENSION, ORAL (FINAL DOSE FORM) ORAL DAILY
COMMUNITY
End: 2023-09-19

## 2021-08-23 RX ORDER — BIOTIN 1 MG
1000 TABLET ORAL DAILY
COMMUNITY
End: 2022-07-19

## 2021-08-23 RX ORDER — MELATONIN 2.5 MG
TABLET,CHEWABLE ORAL
COMMUNITY
End: 2022-07-19

## 2021-08-23 RX ORDER — COLLAGEN, HYDROLYSATE (BOVINE) 100 %
POWDER (GRAM) MISCELLANEOUS
COMMUNITY
End: 2022-07-19

## 2021-08-23 ASSESSMENT — ACTIVITIES OF DAILY LIVING (ADL)
DIFFICULTY_EATING/SWALLOWING: NO
DRESSING/BATHING_DIFFICULTY: NO
DOING_ERRANDS_INDEPENDENTLY_DIFFICULTY: NO
DIFFICULTY_COMMUNICATING: NO
TOILETING_ISSUES: NO
WALKING_OR_CLIMBING_STAIRS_DIFFICULTY: NO
PATIENT_/_FAMILY_COMMUNICATION_STYLE: SPOKEN LANGUAGE (ENGLISH OR BILINGUAL)
CURRENT_FUNCTION: NO ASSISTANCE NEEDED
FALL_HISTORY_WITHIN_LAST_SIX_MONTHS: NO
CONCENTRATING,_REMEMBERING_OR_MAKING_DECISIONS_DIFFICULTY: NO
WEAR_GLASSES_OR_BLIND: YES
HEARING_DIFFICULTY_OR_DEAF: NO

## 2021-08-23 ASSESSMENT — PATIENT HEALTH QUESTIONNAIRE - PHQ9
SUM OF ALL RESPONSES TO PHQ QUESTIONS 1-9: 7
SUM OF ALL RESPONSES TO PHQ QUESTIONS 1-9: 7
10. IF YOU CHECKED OFF ANY PROBLEMS, HOW DIFFICULT HAVE THESE PROBLEMS MADE IT FOR YOU TO DO YOUR WORK, TAKE CARE OF THINGS AT HOME, OR GET ALONG WITH OTHER PEOPLE: NOT DIFFICULT AT ALL

## 2021-08-23 ASSESSMENT — ANXIETY QUESTIONNAIRES
7. FEELING AFRAID AS IF SOMETHING AWFUL MIGHT HAPPEN: NOT AT ALL
3. WORRYING TOO MUCH ABOUT DIFFERENT THINGS: MORE THAN HALF THE DAYS
2. NOT BEING ABLE TO STOP OR CONTROL WORRYING: MORE THAN HALF THE DAYS
4. TROUBLE RELAXING: MORE THAN HALF THE DAYS
6. BECOMING EASILY ANNOYED OR IRRITABLE: MORE THAN HALF THE DAYS
8. IF YOU CHECKED OFF ANY PROBLEMS, HOW DIFFICULT HAVE THESE MADE IT FOR YOU TO DO YOUR WORK, TAKE CARE OF THINGS AT HOME, OR GET ALONG WITH OTHER PEOPLE?: SOMEWHAT DIFFICULT
7. FEELING AFRAID AS IF SOMETHING AWFUL MIGHT HAPPEN: NOT AT ALL
5. BEING SO RESTLESS THAT IT IS HARD TO SIT STILL: MORE THAN HALF THE DAYS
GAD7 TOTAL SCORE: 12
GAD7 TOTAL SCORE: 12
1. FEELING NERVOUS, ANXIOUS, OR ON EDGE: MORE THAN HALF THE DAYS
GAD7 TOTAL SCORE: 12

## 2021-08-23 ASSESSMENT — MIFFLIN-ST. JEOR: SCORE: 1132.05

## 2021-08-23 NOTE — PROGRESS NOTES
SUBJECTIVE:   Jesika Paez is a 75 year old female who presents for Preventive Visit.      Patient has been advised of split billing requirements and indicates understanding: Yes   Are you in the first 12 months of your Medicare coverage?  No    HPI     Her medical history is significant for hyperlipidemia and osteoporosis.  She is on atorvastatin for management of her cholesterol, she is on alendronate for management of osteoporosis.  Last DEXA scan reviewed was performed in July 2019 due for repeat.  Continues to take supplemental calcium and vitamin D.  No concerns for osteoporotic fracture.  She denies any signs or symptoms suggestive of vascular disease.  Denies chest pain or shortness of breath.  Patient describes a good exercise regimen.  She only takes a baby aspirin which is not indicated based on her current risk status.  She does take supplements which were reviewed today.  Discussed routine screening she denies any further desire to have breast cancer screening or colon cancer screening.  In 2019 she underwent lung cancer screening.  She wishes to hold off on arranging for any further screening tests at this time until the current concerns regarding Covid settle down.  She does have macular degeneration.  She follows closely with eye care provider.  She has had a decline in her vision and is upcoming appointment to reassess the situation to decide on further course of action.  We reviewed and discussed immunizations today.  No indication for immunizations at this point.  Discussed considerations related to Covid vaccine booster shots.  Discussed influenza vaccine.    Do you feel safe in your environment? Yes    Have you ever done Advance Care Planning? (For example, a Health Directive, POLST, or a discussion with a medical provider or your loved ones about your wishes): Yes, advance care planning is on file.    Cognitive Screening   1) Repeat 3 items yes  2) Clock draw:  NORMAL  3) 3 item recall:  "Recalls 3 objects  Results: 3 items recalled: COGNITIVE IMPAIRMENT LESS LIKELY    Mini-CogTM Copyright MARYANN Lee. Licensed by the author for use in [facility]; reprinted with permission ([email or web address]). All rights reserved.      Do you have sleep apnea, excessive snoring or daytime drowsiness?: no    Reviewed and updated as needed this visit by clinical staff    Reviewed and updated as needed this visit by Provider    Social History     Tobacco Use     Smoking status: Former Smoker     Years: 30.00     Quit date: 2012     Years since quittin.5     Smokeless tobacco: Never Used   Substance Use Topics     Alcohol use: No     If you drink alcohol do you typically have >3 drinks per day or >7 drinks per week? Yes    Alcohol Use 2021   Prescreen: >3 drinks/day or >7 drinks/week? Not Applicable   Prescreen: >3 drinks/day or >7 drinks/week? -     Current providers sharing in care for this patient include:   Patient Care Team:  Abrahan Diez MD as PCP - General (Family Practice)  Abrahan Diez MD as Assigned PCP    The following health maintenance items are reviewed in Epic and correct as of today:  Health Maintenance Due   Topic Date Due     ANNUAL REVIEW OF HM ORDERS  Never done     MAMMO SCREENING  Never done     FALL RISK ASSESSMENT  2020     LUNG CANCER SCREENING ANNUAL  2020     DTAP/TDAP/TD IMMUNIZATION (1 - Tdap) 2020     Pertinent mammograms are reviewed under the imaging tab.    Review of Systems  Complete review of systems is obtained.  Other than the specific considerations noted above complete review of systems is negative.      OBJECTIVE:   /72   Pulse 60   Ht 1.588 m (5' 2.5\")   Wt 67.6 kg (149 lb)   SpO2 98%   BMI 26.82 kg/m   Estimated body mass index is 26.82 kg/m  as calculated from the following:    Height as of this encounter: 1.588 m (5' 2.5\").    Weight as of this encounter: 67.6 kg (149 lb).  Physical Exam    General Appearance:    Alert, " "cooperative, no distress   Eyes:   No scleral icterus or conjunctival irritation       Ears:    Normal TM's and external ear canals, both ears   Throat:   Lips, mucosa, and tongue normal; teeth and gums normal   Neck:   Supple, symmetrical, trachea midline, no adenopathy;        thyroid:  No enlargement/tenderness/nodules   Lungs:     Clear to auscultation bilaterally, respirations unlabored, no wheezes or crackles   Heart:    Regular rate and rhythm,  No murmur   Abdomen:    Soft, no distention, no tenderness on palpation, no masses, no organomegaly     Extremities:  No edema, no joint swelling or redness, no evidence of any injuries   Skin:  No concerning skin findings, no suspicious moles, no rashes   Neurologic:  On gross examination there is no motor or sensory deficit.  Patient walks with a normal gait     ASSESSMENT / PLAN:   Jesika was seen today for wellness visit.    Diagnoses and all orders for this visit:    Routine history and physical examination of adult    Hyperlipidemia, unspecified hyperlipidemia type  -     Comprehensive metabolic panel (BMP + Alb, Alk Phos, ALT, AST, Total. Bili, TP)  -     Lipid panel reflex to direct LDL Fasting    Osteoporosis, unspecified osteoporosis type, unspecified pathological fracture presence    Macular degeneration (senile) of retina       Patient has been advised of split billing requirements and indicates understanding:   COUNSELING:      Estimated body mass index is 26.82 kg/m  as calculated from the following:    Height as of this encounter: 1.588 m (5' 2.5\").    Weight as of this encounter: 67.6 kg (149 lb).    Weight management plan: Discussed healthy diet and exercise guidelines    She reports that she quit smoking about 9 years ago. She quit after 30.00 years of use. She has never used smokeless tobacco.      Appropriate preventive services were discussed with this patient, including applicable screening as appropriate for cardiovascular disease, diabetes, " osteopenia/osteoporosis, and glaucoma.  As appropriate for age/gender, discussed screening for colorectal cancer, prostate cancer, breast cancer, and cervical cancer. Checklist reviewing preventive services available has been given to the patient.    Reviewed patients plan of care and provided an AVS. The Basic Care Plan (routine screening as documented in Health Maintenance) for Jesika meets the Care Plan requirement. This Care Plan has been established and reviewed with the Patient.    Counseling Resources:  ATP IV Guidelines  Pooled Cohorts Equation Calculator  Breast Cancer Risk Calculator  Breast Cancer: Medication to Reduce Risk  FRAX Risk Assessment  ICSI Preventive Guidelines  Dietary Guidelines for Americans, 2010  [employer]'s MyPlate  ASA Prophylaxis  Lung CA Screening    Abrahan Diez MD, MD  [facility]    Identified Health Risks:  Answers for HPI/ROS submitted by the patient on 8/23/2021  If you checked off any problems, how difficult have these problems made it for you to do your work, take care of things at home, or get along with other people?: Not difficult at all  PHQ9 TOTAL SCORE: 7  BRADY 7 TOTAL SCORE: 12

## 2021-08-24 ASSESSMENT — ANXIETY QUESTIONNAIRES: GAD7 TOTAL SCORE: 12

## 2021-08-24 ASSESSMENT — PATIENT HEALTH QUESTIONNAIRE - PHQ9: SUM OF ALL RESPONSES TO PHQ QUESTIONS 1-9: 7

## 2021-09-09 ENCOUNTER — TRANSFERRED RECORDS (OUTPATIENT)
Dept: HEALTH INFORMATION MANAGEMENT | Facility: CLINIC | Age: 76
End: 2021-09-09

## 2021-10-30 DIAGNOSIS — M81.0 OSTEOPOROSIS, UNSPECIFIED OSTEOPOROSIS TYPE, UNSPECIFIED PATHOLOGICAL FRACTURE PRESENCE: ICD-10-CM

## 2021-10-31 RX ORDER — ALENDRONATE SODIUM 70 MG/1
TABLET ORAL
Qty: 12 TABLET | Refills: 0 | Status: SHIPPED | OUTPATIENT
Start: 2021-10-31 | End: 2022-01-20

## 2021-10-31 NOTE — TELEPHONE ENCOUNTER
"Routing refill request to provider for review/approval because:  Overdue for DEXA    Last Written Prescription:        Last office visit provider:  8/23/21    Requested Prescriptions   Pending Prescriptions Disp Refills     alendronate (FOSAMAX) 70 MG tablet [Pharmacy Med Name: ALENDRONATE SODIUM 70MG TABS] 12 tablet 0     Sig: TAKE 1 TABLET BY MOUTH ONCE WEEKLY WITH 8OZ WATER. DO NOT LIE DOWN, EAT, DRINK, OR TAKE OTHER MEDS FOR 30 MIN       Bisphosphonates Failed - 10/30/2021  4:46 AM        Failed - Dexa on file within past 2 years     Please review last Dexa result.           Passed - Recent (12 mo) or future (30 days) visit within the authorizing provider's specialty     Patient has had an office visit with the authorizing provider or a provider within the authorizing providers department within the previous 12 mos or has a future within next 30 days. See \"Patient Info\" tab in inbasket, or \"Choose Columns\" in Meds & Orders section of the refill encounter.              Passed - Medication is active on med list        Passed - Patient is age 18 or older        Passed - Normal serum creatinine on file within past 12 months     Recent Labs   Lab Test 08/23/21  1433   CR 0.77       Ok to refill medication if creatinine is low               Roma Moreno RN 10/31/21 3:53 PM  "

## 2021-11-16 ENCOUNTER — MYC MEDICAL ADVICE (OUTPATIENT)
Dept: FAMILY MEDICINE | Facility: CLINIC | Age: 76
End: 2021-11-16
Payer: COMMERCIAL

## 2022-01-18 ENCOUNTER — MYC REFILL (OUTPATIENT)
Dept: FAMILY MEDICINE | Facility: CLINIC | Age: 77
End: 2022-01-18
Payer: COMMERCIAL

## 2022-01-18 DIAGNOSIS — M81.0 OSTEOPOROSIS, UNSPECIFIED OSTEOPOROSIS TYPE, UNSPECIFIED PATHOLOGICAL FRACTURE PRESENCE: ICD-10-CM

## 2022-01-20 RX ORDER — ALENDRONATE SODIUM 70 MG/1
TABLET ORAL
Qty: 12 TABLET | Refills: 0 | Status: SHIPPED | OUTPATIENT
Start: 2022-01-20 | End: 2022-04-12

## 2022-01-20 RX ORDER — ALENDRONATE SODIUM 70 MG/1
TABLET ORAL
Qty: 12 TABLET | Refills: 0 | OUTPATIENT
Start: 2022-01-20

## 2022-01-20 NOTE — TELEPHONE ENCOUNTER
"Routing refill request to provider for review/approval because:  DEXA- last done 7/9/19    Last Written Prescription Date:  10/31/21  Last Fill Quantity: 12,  # refills: 0   Last office visit provider:  8/23/21     Requested Prescriptions   Pending Prescriptions Disp Refills     alendronate (FOSAMAX) 70 MG tablet [Pharmacy Med Name: ALENDRONATE SODIUM 70MG TABS] 12 tablet 0     Sig: TAKE 1 TABLET BY MOUTH ONCE WEEKLY WITH 8OZ WATER. DO NOT LIE DOWN, EAT, DRINK, OR TAKE OTHER MEDS FOR 30 MIN       Bisphosphonates Failed - 1/18/2022 12:18 PM        Failed - Dexa on file within past 2 years     Please review last Dexa result.           Passed - Recent (12 mo) or future (30 days) visit within the authorizing provider's specialty     Patient has had an office visit with the authorizing provider or a provider within the authorizing providers department within the previous 12 mos or has a future within next 30 days. See \"Patient Info\" tab in inbasket, or \"Choose Columns\" in Meds & Orders section of the refill encounter.              Passed - Medication is active on med list        Passed - Patient is age 18 or older        Passed - Normal serum creatinine on file within past 12 months     Recent Labs   Lab Test 08/23/21  1433   CR 0.77       Ok to refill medication if creatinine is low               Mindi Box RN 01/20/22 10:46 AM  "

## 2022-01-31 ENCOUNTER — MYC MEDICAL ADVICE (OUTPATIENT)
Dept: FAMILY MEDICINE | Facility: CLINIC | Age: 77
End: 2022-01-31
Payer: COMMERCIAL

## 2022-01-31 DIAGNOSIS — Z87.891 PERSONAL HISTORY OF TOBACCO USE, PRESENTING HAZARDS TO HEALTH: ICD-10-CM

## 2022-01-31 DIAGNOSIS — M81.0 OSTEOPOROSIS, UNSPECIFIED OSTEOPOROSIS TYPE, UNSPECIFIED PATHOLOGICAL FRACTURE PRESENCE: Primary | ICD-10-CM

## 2022-02-01 NOTE — TELEPHONE ENCOUNTER
Was transferred to the Ct Radiologic Tech.  He did not know about the discussion of lung cancer screening with physician guidelines.  He only knew about the lung cancer screening guidelines; age over 60, 30yr pack per day.

## 2022-02-01 NOTE — TELEPHONE ENCOUNTER
There is typically a time requirement for a visit for ordering lung cancer screening.  I do not know with this time requirement is please call radiology and determine when the visit needs to occur.  We will then work out what we should do for helping her get this scheduled.

## 2022-02-10 ENCOUNTER — ANCILLARY PROCEDURE (OUTPATIENT)
Dept: BONE DENSITY | Facility: CLINIC | Age: 77
End: 2022-02-10
Attending: FAMILY MEDICINE
Payer: COMMERCIAL

## 2022-02-10 DIAGNOSIS — M81.0 OSTEOPOROSIS, UNSPECIFIED OSTEOPOROSIS TYPE, UNSPECIFIED PATHOLOGICAL FRACTURE PRESENCE: ICD-10-CM

## 2022-02-10 DIAGNOSIS — Z78.0 POST-MENOPAUSAL: ICD-10-CM

## 2022-02-10 PROCEDURE — 77080 DXA BONE DENSITY AXIAL: CPT | Mod: 59 | Performed by: INTERNAL MEDICINE

## 2022-02-10 PROCEDURE — 77081 DXA BONE DENSITY APPENDICULR: CPT | Performed by: INTERNAL MEDICINE

## 2022-02-24 ENCOUNTER — HOSPITAL ENCOUNTER (OUTPATIENT)
Dept: CT IMAGING | Facility: HOSPITAL | Age: 77
Discharge: HOME | End: 2022-02-24
Attending: FAMILY MEDICINE | Admitting: FAMILY MEDICINE
Payer: COMMERCIAL

## 2022-02-24 DIAGNOSIS — Z87.891 PERSONAL HISTORY OF TOBACCO USE, PRESENTING HAZARDS TO HEALTH: ICD-10-CM

## 2022-02-24 PROCEDURE — 71271 CT THORAX LUNG CANCER SCR C-: CPT

## 2022-02-28 ENCOUNTER — MYC MEDICAL ADVICE (OUTPATIENT)
Dept: FAMILY MEDICINE | Facility: CLINIC | Age: 77
End: 2022-02-28
Payer: COMMERCIAL

## 2022-02-28 DIAGNOSIS — M81.0 OSTEOPOROSIS, UNSPECIFIED OSTEOPOROSIS TYPE, UNSPECIFIED PATHOLOGICAL FRACTURE PRESENCE: Primary | ICD-10-CM

## 2022-04-10 DIAGNOSIS — M81.0 OSTEOPOROSIS, UNSPECIFIED OSTEOPOROSIS TYPE, UNSPECIFIED PATHOLOGICAL FRACTURE PRESENCE: ICD-10-CM

## 2022-04-11 DIAGNOSIS — E78.5 HYPERLIPIDEMIA: ICD-10-CM

## 2022-04-12 RX ORDER — ALENDRONATE SODIUM 70 MG/1
TABLET ORAL
Qty: 12 TABLET | Refills: 1 | Status: SHIPPED | OUTPATIENT
Start: 2022-04-12 | End: 2022-09-13

## 2022-04-14 RX ORDER — ATORVASTATIN CALCIUM 20 MG/1
TABLET, FILM COATED ORAL
Qty: 90 TABLET | Refills: 1 | Status: SHIPPED | OUTPATIENT
Start: 2022-04-14 | End: 2022-10-11

## 2022-04-14 NOTE — TELEPHONE ENCOUNTER
"Last Written Prescription Date:  4/18/2021  Last Fill Quantity: 90,  # refills: 3   Last office visit provider:  8/23/2021     Requested Prescriptions   Pending Prescriptions Disp Refills     atorvastatin (LIPITOR) 20 MG tablet [Pharmacy Med Name: ATORVASTATIN CALCIUM 20MG TABS] 90 tablet 3     Sig: TAKE ONE TABLET BY MOUTH EVERY DAY       Statins Protocol Passed - 4/14/2022  8:10 AM        Passed - LDL on file in past 12 months     Recent Labs   Lab Test 08/23/21  1433   LDL 88             Passed - No abnormal creatine kinase in past 12 months     No lab results found.             Passed - Recent (12 mo) or future (30 days) visit within the authorizing provider's specialty     Patient has had an office visit with the authorizing provider or a provider within the authorizing providers department within the previous 12 mos or has a future within next 30 days. See \"Patient Info\" tab in inbasket, or \"Choose Columns\" in Meds & Orders section of the refill encounter.              Passed - Medication is active on med list        Passed - Patient is age 18 or older        Passed - No active pregnancy on record        Passed - No positive pregnancy test in past 12 months             Christen Martinez RN 04/14/22 8:10 AM  "

## 2022-07-12 ASSESSMENT — ENCOUNTER SYMPTOMS
DISTURBANCES IN COORDINATION: 0
STIFFNESS: 0
EYE REDNESS: 0
SPEECH CHANGE: 0
MEMORY LOSS: 1
NECK PAIN: 0
PARALYSIS: 0
WEAKNESS: 0
DYSURIA: 0
HEADACHES: 1
DIFFICULTY URINATING: 0
EYE PAIN: 0
TREMORS: 0
MUSCLE CRAMPS: 1
ARTHRALGIAS: 0
MYALGIAS: 1
HEMATURIA: 0
MUSCLE WEAKNESS: 0
FLANK PAIN: 0
BACK PAIN: 1
NUMBNESS: 1
EYE WATERING: 0
DOUBLE VISION: 0
LOSS OF CONSCIOUSNESS: 0
DIZZINESS: 1
EYE IRRITATION: 0
TINGLING: 0
JOINT SWELLING: 0
SEIZURES: 0

## 2022-07-18 NOTE — PROGRESS NOTES
Subjective:    New patient, no prior Endocrine notes including Care Everywhere.    Jesika Paez is a 76 year old female who presents for osteoporosis.     We reviewed the osteoporosis dictation template.     DEXA 2/10/2022:  -L1 - L2 T-score -1.0 and BMD stable compared to 2019   -Lowest overall T-score at the hips is -2.1 at the left femoral neck; left and right total hip BMD both stable compared to 2019   -Distal 1/3 radius T-score -2.5 and stable compared to 2019  -The trabecular bone score predicts good micro architecture    CT chest 2/2022: no compression fractures    No prior low impact fractures. She did have a prior ankle fracture.     Fosamax 70 mg once weekly: unclear exactly when started but likely started ~2013 and taken until 7/15/2022 (there was no drug holiday taken at any point); it was well tolerated; she took Fosamax right after breakfast typically     Fosamax is the only medication she has taken to reduce fracture risk.     She has never passed a kidney stone. Renal US in 2018: Each kidney contains a few tiny nonspecific echogenic foci, small nonobstructing stones possible    Remote history of tobacco/alcohol use.     -No prior hypercalcemia (1 prior calcium value was 10.6 mg/dL uncorrected but corrects to normal as concomitant albumin was elevated)    She is edentulous. She has GERD. No prior ASCVD event. Moderate coronary artery calcification on imaging. No history of cancer. No prior radiation therapy.    She takes vitamin D supplement 5000 international unit(s) daily and a separate combination calcium (600 mg) - vitamin D (500 international unit(s) supplement. She has good dietary calcium intake.     Objective:    BMI 27.99 kg/m2, /80    Well appearing. No scleral icterus. She has dentures. No kyphosis. Spine is non tender to palpation. Radial pulse with a regular rate and rhythm.     Assessment/Plan:    # Osteoporosis    At this point she has completed an approximately 10-year  course of Fosamax.  Note that she did not take Fosamax optimally to maximize absorption however she has no low impact fractures and her bone mineral density is stable.  She will stop Fosamax.    We will get a baseline lumbar spine image.  To optimize her calcium and vitamin D supplementation we will check standard bone labs as well to evaluate for causes of increased fracture risk.  Note that prior renal ultrasound suggested possible nephrolithiasis we will also check a 24-hour urine calcium and KUB.  Return to see me after testing.    46 minutes spent on the date of the encounter doing chart review, history and exam, documentation and further activities as noted above.

## 2022-07-19 ENCOUNTER — OFFICE VISIT (OUTPATIENT)
Dept: ENDOCRINOLOGY | Facility: CLINIC | Age: 77
End: 2022-07-19
Attending: FAMILY MEDICINE
Payer: COMMERCIAL

## 2022-07-19 ENCOUNTER — HOSPITAL ENCOUNTER (OUTPATIENT)
Dept: GENERAL RADIOLOGY | Facility: HOSPITAL | Age: 77
Discharge: HOME | End: 2022-07-19
Attending: INTERNAL MEDICINE
Payer: COMMERCIAL

## 2022-07-19 ENCOUNTER — LAB (OUTPATIENT)
Dept: LAB | Facility: CLINIC | Age: 77
End: 2022-07-19

## 2022-07-19 VITALS
DIASTOLIC BLOOD PRESSURE: 80 MMHG | BODY MASS INDEX: 28 KG/M2 | HEART RATE: 60 BPM | HEIGHT: 63 IN | SYSTOLIC BLOOD PRESSURE: 130 MMHG | WEIGHT: 158 LBS

## 2022-07-19 DIAGNOSIS — Z79.83 ENCOUNTER FOR ONGOING OSTEOPOROSIS THERAPY, BISPHOSPHONATES: ICD-10-CM

## 2022-07-19 DIAGNOSIS — N20.0 NEPHROLITHIASIS: ICD-10-CM

## 2022-07-19 DIAGNOSIS — Z78.0 MENOPAUSE: ICD-10-CM

## 2022-07-19 DIAGNOSIS — M81.0 AGE-RELATED OSTEOPOROSIS WITHOUT CURRENT PATHOLOGICAL FRACTURE: ICD-10-CM

## 2022-07-19 DIAGNOSIS — R53.82 CHRONIC FATIGUE: ICD-10-CM

## 2022-07-19 DIAGNOSIS — M81.0 ENCOUNTER FOR ONGOING OSTEOPOROSIS THERAPY, BISPHOSPHONATES: ICD-10-CM

## 2022-07-19 LAB
ALBUMIN SERPL BCG-MCNC: 4.7 G/DL (ref 3.5–5.2)
ALP SERPL-CCNC: 44 U/L (ref 35–104)
CALCIUM SERPL-MCNC: 9.5 MG/DL (ref 8.8–10.2)
CREAT SERPL-MCNC: 0.72 MG/DL (ref 0.51–0.95)
GFR SERPL CREATININE-BSD FRML MDRD: 86 ML/MIN/1.73M2
IGA SERPL-MCNC: 89 MG/DL (ref 84–499)
PHOSPHATE SERPL-MCNC: 4 MG/DL (ref 2.5–4.5)
TOTAL PROTEIN SERUM FOR ELP: 6.9 G/DL (ref 6.4–8.3)
TSH SERPL DL<=0.005 MIU/L-ACNC: 1.35 UIU/ML (ref 0.3–4.2)
TTG IGA SER-ACNC: <0.2 U/ML
TTG IGG SER-ACNC: <0.6 U/ML

## 2022-07-19 PROCEDURE — 36415 COLL VENOUS BLD VENIPUNCTURE: CPT

## 2022-07-19 PROCEDURE — 99204 OFFICE O/P NEW MOD 45 MIN: CPT | Performed by: INTERNAL MEDICINE

## 2022-07-19 PROCEDURE — 84443 ASSAY THYROID STIM HORMONE: CPT

## 2022-07-19 PROCEDURE — 82784 ASSAY IGA/IGD/IGG/IGM EACH: CPT

## 2022-07-19 PROCEDURE — 84165 PROTEIN E-PHORESIS SERUM: CPT

## 2022-07-19 PROCEDURE — 72100 X-RAY EXAM L-S SPINE 2/3 VWS: CPT | Mod: FY

## 2022-07-19 PROCEDURE — 84155 ASSAY OF PROTEIN SERUM: CPT

## 2022-07-19 PROCEDURE — 86364 TISS TRNSGLTMNASE EA IG CLAS: CPT

## 2022-07-19 PROCEDURE — 82040 ASSAY OF SERUM ALBUMIN: CPT

## 2022-07-19 PROCEDURE — 82306 VITAMIN D 25 HYDROXY: CPT

## 2022-07-19 PROCEDURE — 82310 ASSAY OF CALCIUM: CPT

## 2022-07-19 PROCEDURE — 74018 RADEX ABDOMEN 1 VIEW: CPT | Mod: FY

## 2022-07-19 PROCEDURE — 82565 ASSAY OF CREATININE: CPT

## 2022-07-19 PROCEDURE — 84075 ASSAY ALKALINE PHOSPHATASE: CPT

## 2022-07-19 PROCEDURE — 84100 ASSAY OF PHOSPHORUS: CPT

## 2022-07-19 NOTE — LETTER
7/19/2022         RE: Jesika Paez  [address]  [place] [address]        Dear Colleague,    Thank you for referring your patient, Jesika Paez, to the [facility]. Please see a copy of my visit note below.    Subjective:    New patient, no prior Endocrine notes including Care Everywhere.    Jesika Paez is a 76 year old female who presents for osteoporosis.     We reviewed the osteoporosis dictation template.     DEXA 2/10/2022:  -L1 - L2 T-score -1.0 and BMD stable compared to 2019   -Lowest overall T-score at the hips is -2.1 at the left femoral neck; left and right total hip BMD both stable compared to 2019   -Distal 1/3 radius T-score -2.5 and stable compared to 2019  -The trabecular bone score predicts good micro architecture    CT chest 2/2022: no compression fractures    No prior low impact fractures. She did have a prior ankle fracture.     Fosamax 70 mg once weekly: unclear exactly when started but likely started ~2013 and taken until 7/15/2022 (there was no drug holiday taken at any point); it was well tolerated; she took Fosamax right after breakfast typically     Fosamax is the only medication she has taken to reduce fracture risk.     She has never passed a kidney stone. Renal US in 2018: Each kidney contains a few tiny nonspecific echogenic foci, small nonobstructing stones possible    Remote history of tobacco/alcohol use.     -No prior hypercalcemia (1 prior calcium value was 10.6 mg/dL uncorrected but corrects to normal as concomitant albumin was elevated)    She is edentulous. She has GERD. No prior ASCVD event. Moderate coronary artery calcification on imaging. No history of cancer. No prior radiation therapy.    She takes vitamin D supplement 5000 international unit(s) daily and a separate combination calcium (600 mg) - vitamin D (500 international unit(s) supplement. She has good dietary calcium intake.     Objective:    BMI 27.99  kg/m2, /80    Well appearing. No scleral icterus. She has dentures. No kyphosis. Spine is non tender to palpation. Radial pulse with a regular rate and rhythm.     Assessment/Plan:    # Osteoporosis    At this point she has completed an approximately 10-year course of Fosamax.  Note that she did not take Fosamax optimally to maximize absorption however she has no low impact fractures and her bone mineral density is stable.  She will stop Fosamax.    We will get a baseline lumbar spine image.  To optimize her calcium and vitamin D supplementation we will check standard bone labs as well to evaluate for causes of increased fracture risk.  Note that prior renal ultrasound suggested possible nephrolithiasis we will also check a 24-hour urine calcium and KUB.  Return to see me after testing.    46 minutes spent on the date of the encounter doing chart review, history and exam, documentation and further activities as noted above.       Again, thank you for allowing me to participate in the care of your patient.        Sincerely,        Justo Kathleen MD

## 2022-07-20 ENCOUNTER — TELEPHONE (OUTPATIENT)
Dept: ENDOCRINOLOGY | Facility: CLINIC | Age: 77
End: 2022-07-20

## 2022-07-20 ENCOUNTER — LAB (OUTPATIENT)
Dept: LAB | Facility: CLINIC | Age: 77
End: 2022-07-20
Payer: COMMERCIAL

## 2022-07-20 DIAGNOSIS — Z78.0 MENOPAUSE: ICD-10-CM

## 2022-07-20 DIAGNOSIS — N20.0 NEPHROLITHIASIS: ICD-10-CM

## 2022-07-20 DIAGNOSIS — M81.0 ENCOUNTER FOR ONGOING OSTEOPOROSIS THERAPY, BISPHOSPHONATES: ICD-10-CM

## 2022-07-20 DIAGNOSIS — Z79.83 ENCOUNTER FOR ONGOING OSTEOPOROSIS THERAPY, BISPHOSPHONATES: ICD-10-CM

## 2022-07-20 DIAGNOSIS — R53.82 CHRONIC FATIGUE: ICD-10-CM

## 2022-07-20 DIAGNOSIS — M81.0 AGE-RELATED OSTEOPOROSIS WITHOUT CURRENT PATHOLOGICAL FRACTURE: ICD-10-CM

## 2022-07-20 LAB
ALBUMIN SERPL ELPH-MCNC: 4.5 G/DL (ref 3.7–5.1)
ALPHA1 GLOB SERPL ELPH-MCNC: 0.3 G/DL (ref 0.2–0.4)
ALPHA2 GLOB SERPL ELPH-MCNC: 0.7 G/DL (ref 0.5–0.9)
B-GLOBULIN SERPL ELPH-MCNC: 0.7 G/DL (ref 0.6–1)
CALCIUM 24H UR-MRATE: 0.08 G/SPEC (ref 0.1–0.3)
CALCIUM UR-MCNC: 2.3 MG/DL
COLLECT DURATION TIME UR: 20 H
GAMMA GLOB SERPL ELPH-MCNC: 0.8 G/DL (ref 0.7–1.6)
M PROTEIN SERPL ELPH-MCNC: 0 G/DL
PROT PATTERN SERPL ELPH-IMP: NORMAL
SPECIMEN VOL UR: 3000 ML

## 2022-07-20 PROCEDURE — 82340 ASSAY OF CALCIUM IN URINE: CPT

## 2022-07-20 PROCEDURE — 81050 URINALYSIS VOLUME MEASURE: CPT

## 2022-07-20 NOTE — TELEPHONE ENCOUNTER
[facility] Call Center    Phone Message    May a detailed message be left on voicemail: yes     Reason for Call: Other: patient called in she had a lab where she needed to fill a container. for a clean drop for today at the clinic. She was supposed to continue doing this till 12:30PM but she has filled up the container. She would like a call back from the clinic to adivse on how to proceed.  Thank you.     Action Taken: Other: Endo    Travel Screening: Not Applicable

## 2022-07-22 LAB
DEPRECATED CALCIDIOL+CALCIFEROL SERPL-MC: 36 UG/L (ref 20–75)
VITAMIN D2 SERPL-MCNC: 7 UG/L
VITAMIN D3 SERPL-MCNC: 29 UG/L

## 2022-09-12 ASSESSMENT — ENCOUNTER SYMPTOMS
DIZZINESS: 0
NAUSEA: 0
SHORTNESS OF BREATH: 0
PARESTHESIAS: 0
PALPITATIONS: 0
BREAST MASS: 0
WEAKNESS: 0
DIARRHEA: 0
ABDOMINAL PAIN: 0
NERVOUS/ANXIOUS: 0
SORE THROAT: 0
ARTHRALGIAS: 0
HEARTBURN: 0
DYSURIA: 0
HEMATOCHEZIA: 0
MYALGIAS: 0
CHILLS: 0
EYE PAIN: 0
HEMATURIA: 0
COUGH: 0
FREQUENCY: 1
HEADACHES: 0
FEVER: 0
CONSTIPATION: 0
JOINT SWELLING: 0

## 2022-09-12 ASSESSMENT — ACTIVITIES OF DAILY LIVING (ADL): CURRENT_FUNCTION: TRANSPORTATION REQUIRES ASSISTANCE

## 2022-09-13 ENCOUNTER — OFFICE VISIT (OUTPATIENT)
Dept: FAMILY MEDICINE | Facility: CLINIC | Age: 77
End: 2022-09-13
Payer: COMMERCIAL

## 2022-09-13 VITALS
RESPIRATION RATE: 18 BRPM | BODY MASS INDEX: 27.55 KG/M2 | SYSTOLIC BLOOD PRESSURE: 124 MMHG | OXYGEN SATURATION: 98 % | HEART RATE: 61 BPM | HEIGHT: 63 IN | WEIGHT: 155.5 LBS | TEMPERATURE: 98.6 F | DIASTOLIC BLOOD PRESSURE: 68 MMHG

## 2022-09-13 DIAGNOSIS — Z00.00 ROUTINE HISTORY AND PHYSICAL EXAMINATION OF ADULT: Primary | ICD-10-CM

## 2022-09-13 DIAGNOSIS — M81.0 OSTEOPOROSIS, UNSPECIFIED OSTEOPOROSIS TYPE, UNSPECIFIED PATHOLOGICAL FRACTURE PRESENCE: ICD-10-CM

## 2022-09-13 DIAGNOSIS — E78.5 HYPERLIPIDEMIA, UNSPECIFIED HYPERLIPIDEMIA TYPE: ICD-10-CM

## 2022-09-13 LAB
ALBUMIN SERPL BCG-MCNC: 4.8 G/DL (ref 3.5–5.2)
ALP SERPL-CCNC: 42 U/L (ref 35–104)
ALT SERPL W P-5'-P-CCNC: 28 U/L (ref 10–35)
ANION GAP SERPL CALCULATED.3IONS-SCNC: 12 MMOL/L (ref 7–15)
AST SERPL W P-5'-P-CCNC: 28 U/L (ref 10–35)
BILIRUB SERPL-MCNC: 1.3 MG/DL
BUN SERPL-MCNC: 15 MG/DL (ref 8–23)
CALCIUM SERPL-MCNC: 9.8 MG/DL (ref 8.8–10.2)
CHLORIDE SERPL-SCNC: 106 MMOL/L (ref 98–107)
CHOLEST SERPL-MCNC: 170 MG/DL
CREAT SERPL-MCNC: 0.77 MG/DL (ref 0.51–0.95)
DEPRECATED HCO3 PLAS-SCNC: 26 MMOL/L (ref 22–29)
GFR SERPL CREATININE-BSD FRML MDRD: 80 ML/MIN/1.73M2
GLUCOSE SERPL-MCNC: 99 MG/DL (ref 70–99)
HDLC SERPL-MCNC: 71 MG/DL
HOLD SPECIMEN: NORMAL
LDLC SERPL CALC-MCNC: 84 MG/DL
NONHDLC SERPL-MCNC: 99 MG/DL
POTASSIUM SERPL-SCNC: 4.6 MMOL/L (ref 3.4–5.3)
PROT SERPL-MCNC: 7.3 G/DL (ref 6.4–8.3)
SODIUM SERPL-SCNC: 144 MMOL/L (ref 136–145)
TRIGL SERPL-MCNC: 73 MG/DL

## 2022-09-13 PROCEDURE — G0439 PPPS, SUBSEQ VISIT: HCPCS | Performed by: FAMILY MEDICINE

## 2022-09-13 PROCEDURE — 80053 COMPREHEN METABOLIC PANEL: CPT | Performed by: FAMILY MEDICINE

## 2022-09-13 PROCEDURE — 90662 IIV NO PRSV INCREASED AG IM: CPT | Performed by: FAMILY MEDICINE

## 2022-09-13 PROCEDURE — G0008 ADMIN INFLUENZA VIRUS VAC: HCPCS | Performed by: FAMILY MEDICINE

## 2022-09-13 PROCEDURE — 36415 COLL VENOUS BLD VENIPUNCTURE: CPT | Performed by: FAMILY MEDICINE

## 2022-09-13 PROCEDURE — 80061 LIPID PANEL: CPT | Performed by: FAMILY MEDICINE

## 2022-09-13 RX ORDER — DIMENHYDRINATE 50 MG
1 TABLET ORAL DAILY
COMMUNITY
End: 2023-09-19

## 2022-09-13 ASSESSMENT — ACTIVITIES OF DAILY LIVING (ADL)
HEARING_DIFFICULTY_OR_DEAF: NO
FALL_HISTORY_WITHIN_LAST_SIX_MONTHS: NO
WALKING_OR_CLIMBING_STAIRS_DIFFICULTY: NO
WEAR_GLASSES_OR_BLIND: YES
CHANGE_IN_FUNCTIONAL_STATUS_SINCE_ONSET_OF_CURRENT_ILLNESS/INJURY: NO
DIFFICULTY_EATING/SWALLOWING: NO
DRESSING/BATHING_DIFFICULTY: NO
DOING_ERRANDS_INDEPENDENTLY_DIFFICULTY: NO
CONCENTRATING,_REMEMBERING_OR_MAKING_DECISIONS_DIFFICULTY: NO
TOILETING_ISSUES: NO
DIFFICULTY_COMMUNICATING: NO

## 2022-09-13 ASSESSMENT — PAIN SCALES - GENERAL: PAINLEVEL: NO PAIN (0)

## 2022-09-13 NOTE — PROGRESS NOTES
SUBJECTIVE:   Jesika is a 76 year old who presents for Preventive Visit.    She is osteoporosis.  She has been on alendronate for greater than 5 years.  She was seen by endocrinology.  Had additional testing.  Has follow-up scheduled.  Patient has optimize calcium and vitamin D dosing.  She remains active.  She has no specific concerns in this regard today.  She has hyperlipidemia takes a statin.  Due for recheck of labs.  Denies any signs or symptoms of vascular disease.  No intolerance to medication.    Reviewed and discussed other routine health preventive measures.  Based on smoking history within the past 15 years has undergone lung cancer screening on a yearly basis with last in February.  Discussed continued screening.  Discussed and reviewed immunizations.  Up-to-date with the exception of obtaining new COVID booster vaccine when able.      She does have foot pain consistent with plantar fasciitis bilaterally discussed extensively.        Patient has been advised of split billing requirements and indicates understanding: Yes  Are you in the first 12 months of your Medicare coverage?  No    Do you feel safe in your environment? Yes    Have you ever done Advance Care Planning? (For example, a Health Directive, POLST, or a discussion with a medical provider or your loved ones about your wishes): Yes, advance care planning is on file.    Cognitive Screening   1) Repeat 3 items (Leader, Season, Table)    2) Clock draw: NORMAL  3) 3 item recall: Recalls 3 objects  Results: 3 items recalled: COGNITIVE IMPAIRMENT LESS LIKELY    Mini-CogTM Copyright S Jesus. Licensed by the author for use in [facility] [employer]; reprinted with permission (miguel@.[facility]). All rights reserved.      Do you have sleep apnea, excessive snoring or daytime drowsiness?: no    Reviewed and updated as needed this visit by clinical staff    Reviewed and updated as needed this visit by Provider    Social History     Tobacco Use     Smoking  status: Former Smoker     Packs/day: 1.00     Years: 30.00     Pack years: 30.00     Start date: 1/1/1982     Quit date: 2/2/2012     Years since quitting: 10.6     Smokeless tobacco: Never Used   Substance Use Topics     Alcohol use: No     If you drink alcohol do you typically have >3 drinks per day or >7 drinks per week? No    Alcohol Use 9/12/2022   Prescreen: >3 drinks/day or >7 drinks/week? Not Applicable   Prescreen: >3 drinks/day or >7 drinks/week? -   No flowsheet data found.      Current providers sharing in care for this patient include:   Patient Care Team:  Abrahan Diez MD as PCP - General (Family Practice)  Abrahan Diez MD as Assigned PCP  Justo Kathleen MD as MD (Endocrinology, Diabetes, and Metabolism)  Justo Kathleen MD as Assigned Endocrinology Provider    The following health maintenance items are reviewed in Epic and correct as of today:  Health Maintenance   Topic Date Due     ANNUAL REVIEW OF HM ORDERS  Never done     COVID-19 Vaccine (4 - Booster for Moderna series) 03/16/2022     INFLUENZA VACCINE (1) 09/01/2022     MEDICARE ANNUAL WELLNESS VISIT  08/23/2022     LUNG CANCER SCREENING  02/24/2023     FALL RISK ASSESSMENT  09/13/2023     LIPID  08/23/2026     ADVANCE CARE PLANNING  08/23/2026     DTAP/TDAP/TD IMMUNIZATION (2 - Td or Tdap) 11/05/2030     DEXA  02/10/2037     HEPATITIS C SCREENING  Completed     PHQ-2 (once per calendar year)  Completed     Pneumococcal Vaccine: 65+ Years  Completed     ZOSTER IMMUNIZATION  Completed     IPV IMMUNIZATION  Aged Out     MENINGITIS IMMUNIZATION  Aged Out     HEPATITIS B IMMUNIZATION  Aged Out             Pertinent mammograms are reviewed under the imaging tab.    Review of Systems  Complete review of systems is obtained.  Other than the specific considerations noted above complete review of systems is negative.      OBJECTIVE:   There were no vitals taken for this visit. Estimated body mass index is 27.99 kg/m  as calculated  "from the following:    Height as of 7/19/22: 1.6 m (5' 3\").    Weight as of 7/19/22: 71.7 kg (158 lb).  Physical Exam        General Appearance:    Alert, cooperative, no distress   Eyes:   No scleral icterus or conjunctival irritation       Ears:    Normal TM's and external ear canals, both ears   Throat:   Lips, mucosa, and tongue normal; teeth and gums normal   Neck:   Supple, symmetrical, trachea midline, no adenopathy;        thyroid:  No enlargement/tenderness/nodules   Lungs:     Clear to auscultation bilaterally, respirations unlabored, no wheezes or crackles   Heart:    Regular rate and rhythm,  No murmur   Abdomen:    Soft, no distention, no tenderness on palpation, no masses, no organomegaly     Extremities:  No edema, no joint swelling or redness, no evidence of any injuries   Skin:  No concerning skin findings, no suspicious moles, no rashes   Neurologic:  On gross examination there is no motor or sensory deficit.  Patient walks with a normal gait         ASSESSMENT / PLAN:   Jesika was seen today for wellness visit and musculoskeletal problem.    Diagnoses and all orders for this visit:    Routine history and physical examination of adult    Osteoporosis, unspecified osteoporosis type, unspecified pathological fracture presence    Hyperlipidemia, unspecified hyperlipidemia type  -     Lipid panel reflex to direct LDL Fasting  -     Comprehensive metabolic panel (BMP + Alb, Alk Phos, ALT, AST, Total. Bili, TP)    Other orders  -     INFLUENZA, QUAD, HIGH DOSE, PF, 65YR + (FLUZONE HD)           Patient has been advised of split billing requirements and indicates understanding: Yes    COUNSELING:  Reviewed preventive health counseling, as reflected in patient instructions       Regular exercise       Healthy diet/nutrition       Vision screening       Hearing screening Answers for HPI/ROS submitted by the patient on 9/12/2022  In general, how would you rate your overall physical health?: good  Frequency " "of exercise:: 6-7 days/week  Do you usually eat at least 4 servings of fruit and vegetables a day, include whole grains & fiber, and avoid regularly eating high fat or \"junk\" foods? : No  Taking medications regularly:: Yes  Medication side effects:: Not applicable  Activities of Daily Living: transportation requires assistance  Home safety: no safety concerns identified  Hearing Impairment:: no hearing concerns  In the past 6 months, have you been bothered by leaking of urine?: No  In general, how would you rate your overall mental or emotional health?: good  Additional concerns today:: Yes  Duration of exercise:: 45-60 minutes    Answers for HPI/ROS submitted by the patient on 9/12/2022  In general, how would you rate your overall physical health?: good  Frequency of exercise:: 6-7 days/week  Do you usually eat at least 4 servings of fruit and vegetables a day, include whole grains & fiber, and avoid regularly eating high fat or \"junk\" foods? : No  Taking medications regularly:: Yes  Medication side effects:: Not applicable  Activities of Daily Living: transportation requires assistance  Home safety: no safety concerns identified  Hearing Impairment:: no hearing concerns  In the past 6 months, have you been bothered by leaking of urine?: No  In general, how would you rate your overall mental or emotional health?: good  Additional concerns today:: Yes  Duration of exercise:: 45-60 minutes        Estimated body mass index is 27.99 kg/m  as calculated from the following:    Height as of 7/19/22: 1.6 m (5' 3\").    Weight as of 7/19/22: 71.7 kg (158 lb).        She reports that she quit smoking about 10 years ago. She started smoking about 40 years ago. She has a 30.00 pack-year smoking history. She has never used smokeless tobacco.      Appropriate preventive services were discussed with this patient, including applicable screening as appropriate for cardiovascular disease, diabetes, osteopenia/osteoporosis, and " glaucoma.  As appropriate for age/gender, discussed screening for colorectal cancer, prostate cancer, breast cancer, and cervical cancer. Checklist reviewing preventive services available has been given to the patient.    Reviewed patients plan of care and provided an AVS. The Basic Care Plan (routine screening as documented in Health Maintenance) for Jesika meets the Care Plan requirement. This Care Plan has been established and reviewed with the Patient.    Counseling Resources:  ATP IV Guidelines  Pooled Cohorts Equation Calculator  Breast Cancer Risk Calculator  Breast Cancer: Medication to Reduce Risk  FRAX Risk Assessment  ICSI Preventive Guidelines  Dietary Guidelines for Americans, 2010  USDA's MyPlate  ASA Prophylaxis  Lung CA Screening    Abrahan Diez MD, MD  [facility]    Identified Health Risks:

## 2022-10-11 DIAGNOSIS — E78.5 HYPERLIPIDEMIA: ICD-10-CM

## 2022-10-11 RX ORDER — ATORVASTATIN CALCIUM 20 MG/1
TABLET, FILM COATED ORAL
Qty: 90 TABLET | Refills: 3 | Status: SHIPPED | OUTPATIENT
Start: 2022-10-11 | End: 2023-10-10

## 2022-10-11 NOTE — TELEPHONE ENCOUNTER
"Last Written Prescription Date:  4/14/22  Last Fill Quantity: 90,  # refills: 1   Last office visit provider:  9/13/22     Requested Prescriptions   Pending Prescriptions Disp Refills     atorvastatin (LIPITOR) 20 MG tablet [Pharmacy Med Name: ATORVASTATIN CALCIUM 20MG TABS] 90 tablet 1     Sig: TAKE ONE TABLET BY MOUTH EVERY DAY       Statins Protocol Passed - 10/11/2022  5:08 AM        Passed - LDL on file in past 12 months     Recent Labs   Lab Test 09/13/22  1212   LDL 84             Passed - No abnormal creatine kinase in past 12 months     No lab results found.             Passed - Recent (12 mo) or future (30 days) visit within the authorizing provider's specialty     Patient has had an office visit with the authorizing provider or a provider within the authorizing providers department within the previous 12 mos or has a future within next 30 days. See \"Patient Info\" tab in inbasket, or \"Choose Columns\" in Meds & Orders section of the refill encounter.              Passed - Medication is active on med list        Passed - Patient is age 18 or older        Passed - No active pregnancy on record        Passed - No positive pregnancy test in past 12 months             Loyda Hsu, RN 10/11/22 1:33 PM  "

## 2022-10-20 ENCOUNTER — MYC MEDICAL ADVICE (OUTPATIENT)
Dept: FAMILY MEDICINE | Facility: CLINIC | Age: 77
End: 2022-10-20

## 2023-01-30 ENCOUNTER — MYC MEDICAL ADVICE (OUTPATIENT)
Dept: FAMILY MEDICINE | Facility: CLINIC | Age: 78
End: 2023-01-30
Payer: COMMERCIAL

## 2023-01-30 ENCOUNTER — NURSE TRIAGE (OUTPATIENT)
Dept: FAMILY MEDICINE | Facility: CLINIC | Age: 78
End: 2023-01-30

## 2023-01-30 NOTE — TELEPHONE ENCOUNTER
"Nurse Triage SBAR    Is this a 2nd Level Triage? YES, LICENSED PRACTITIONER REVIEW IS REQUIRED    Situation:   Via MyC message:  \" ve been taking ADVIL for allergy & congestion relief during the day and NYQUIL for night time, this is not working! Last week I took a Covid test and last month I also took one, both were negative.  Can you suggest another over the counter product or prescription to finally get rid of this, please!   Two months and going on three now is too long, I have never had a cold for this long in my life. Usually a few days and maybe one or twice a year.\"    Background:   Negative COVID tests last month (Dec) and last week Tuesday.    Assessment:   Runny nose  Cough- sometimes productive  Postnasal drainage in throat for the past few days.  Congestion.  Drink a lot of fluids.  A little bit of chills every now and then.  No fever.  Sore throat for a couple days, but not now.  Kind of sluggish and don't have a whole of energy.  Taking Advil to help with runny nose and sneezing.    Protocol Recommended Disposition:   No disposition on file.    Recommendation:   Care advices given. Advised pt to be seen today or tomorrow at [facility] as there are no available clinic appts. Patient stated that she does not want to go to [facility], she would rather see her PCP. Due to no transportation, she cannot come in today since her son has soon later.      Any possibility of double booking patient for tomorrow?    Routed to provider    Does the patient meet one of the following criteria for ADS visit consideration? 16+ years old, with an [facility] PCP     TIP  Providers, please consider if this condition is appropriate for management at one of our Acute and Diagnostic Services sites.     If patient is a good candidate, please use dotphrase <dot>triageresponse and select Refer to ADS to document.    Reason for Disposition    Using nasal washes and pain medicine > 24 hours and sinus pain (lower forehead, cheekbone, or eye) " persists    Additional Information    Negative: SEVERE difficulty breathing (e.g., struggling for each breath, speaks in single words)    Negative: Very weak (can't stand)    Negative: Sounds like a life-threatening emergency to the triager    Negative: Difficulty breathing and not from stuffy nose (e.g., not relieved by cleaning out the nose)    Negative: Runny nose is caused by pollen or other allergies    Negative: Sore throat is main symptom    Negative: Cough is main symptom    Negative: Patient sounds very sick or weak to the triager    Negative: Fever > 103 F (39.4 C)    Negative: Fever > 101 F (38.3 C) and over 60 years of age    Negative: Fever > 100.0 F (37.8 C) and has diabetes mellitus or a weak immune system (e.g., HIV positive, cancer chemotherapy, organ transplant, splenectomy, chronic steroids)    Negative: Fever > 100.0 F (37.8 C) and bedridden (e.g., nursing home patient, stroke, chronic illness, recovering from surgery)    Negative: Earache    Negative: Sinus pain (not just congestion) and fever    Negative: Fever returns after gone for over 24 hours and symptoms worse or not improved    Negative: Fever present > 3 days (72 hours)    Negative: Sinus congestion (pressure, fullness) present > 10 days    Negative: Nasal discharge present > 10 days    Protocols used: COMMON COLD-A-OH    Yecenia Jaimes, RODDYN, RN  [facility]

## 2023-01-31 ENCOUNTER — ANCILLARY PROCEDURE (OUTPATIENT)
Dept: GENERAL RADIOLOGY | Facility: CLINIC | Age: 78
End: 2023-01-31
Attending: FAMILY MEDICINE
Payer: COMMERCIAL

## 2023-01-31 ENCOUNTER — OFFICE VISIT (OUTPATIENT)
Dept: FAMILY MEDICINE | Facility: CLINIC | Age: 78
End: 2023-01-31
Payer: COMMERCIAL

## 2023-01-31 VITALS
TEMPERATURE: 98.1 F | HEIGHT: 63 IN | BODY MASS INDEX: 28.35 KG/M2 | OXYGEN SATURATION: 95 % | WEIGHT: 160 LBS | SYSTOLIC BLOOD PRESSURE: 126 MMHG | HEART RATE: 64 BPM | DIASTOLIC BLOOD PRESSURE: 74 MMHG | RESPIRATION RATE: 18 BRPM
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
## 2023-01-31 DIAGNOSIS — R32 URINARY INCONTINENCE, UNSPECIFIED TYPE: ICD-10-CM

## 2023-01-31 DIAGNOSIS — R09.81 NASAL CONGESTION: ICD-10-CM

## 2023-01-31 DIAGNOSIS — J02.9 ACUTE SORE THROAT: ICD-10-CM

## 2023-01-31 DIAGNOSIS — M25.561 ACUTE PAIN OF BOTH KNEES: ICD-10-CM

## 2023-01-31 DIAGNOSIS — M79.622 PAIN OF LEFT UPPER ARM: ICD-10-CM

## 2023-01-31 DIAGNOSIS — R05.1 ACUTE COUGH: ICD-10-CM

## 2023-01-31 DIAGNOSIS — J40 BRONCHITIS: Primary | ICD-10-CM

## 2023-01-31 DIAGNOSIS — M25.562 ACUTE PAIN OF BOTH KNEES: ICD-10-CM

## 2023-01-31 PROCEDURE — 99214 OFFICE O/P EST MOD 30 MIN: CPT | Performed by: FAMILY MEDICINE

## 2023-01-31 PROCEDURE — 71046 X-RAY EXAM CHEST 2 VIEWS: CPT | Mod: TC | Performed by: RADIOLOGY

## 2023-01-31 RX ORDER — BIOTIN 10000 MCG
CAPSULE ORAL
COMMUNITY
End: 2023-09-19

## 2023-01-31 RX ORDER — PREDNISONE 10 MG/1
10 TABLET ORAL DAILY
Qty: 5 TABLET | Refills: 0 | Status: SHIPPED | OUTPATIENT
Start: 2023-01-31 | End: 2023-02-10

## 2023-01-31 RX ORDER — BENZONATATE 100 MG/1
100 CAPSULE ORAL 3 TIMES DAILY PRN
Qty: 30 CAPSULE | Refills: 0 | Status: SHIPPED | OUTPATIENT
Start: 2023-01-31 | End: 2023-02-10

## 2023-01-31 RX ORDER — AZITHROMYCIN 250 MG/1
TABLET, FILM COATED ORAL
Qty: 6 TABLET | Refills: 0 | Status: SHIPPED | OUTPATIENT
Start: 2023-01-31 | End: 2023-02-05

## 2023-01-31 RX ORDER — MULTIVIT-MIN/IRON/FOLIC ACID/K 18-600-40
CAPSULE ORAL
COMMUNITY
End: 2024-06-20

## 2023-01-31 ASSESSMENT — PAIN SCALES - GENERAL: PAINLEVEL: NO PAIN (0)

## 2023-01-31 NOTE — PROGRESS NOTES
"Jesika Paez  /74   Pulse 64   Temp 98.1  F (36.7  C)   Resp 18   Ht 1.6 m (5' 3\")   Wt 72.6 kg (160 lb)   SpO2 95%   BMI 28.34 kg/m       Assessment/Plan:                Jesika was seen today for uri.    Diagnoses and all orders for this visit:    Bronchitis  -     azithromycin (ZITHROMAX) 250 MG tablet; Take 2 tablets (500 mg) by mouth daily for 1 day, THEN 1 tablet (250 mg) daily for 4 days.  -     predniSONE (DELTASONE) 10 MG tablet; Take 1 tablet (10 mg) by mouth daily    Acute cough  -     XR Chest 2 Views; Future  -     benzonatate (TESSALON) 100 MG capsule; Take 1 capsule (100 mg) by mouth 3 times daily as needed for cough  -     predniSONE (DELTASONE) 10 MG tablet; Take 1 tablet (10 mg) by mouth daily    Nasal congestion    Acute sore throat    Urinary incontinence, unspecified type    Fall, initial encounter    Acute pain of both knees    Pain of left upper arm         DISCUSSION  Respiratory infection symptoms: Given negative home COVID test elected not to repeat testing but will obtain chest x-ray today.    Fall: Fortunately no significant injuries are noted on examination today.  Discussed ways to prevent falls including limiting certain medications such as antihistamines.    Urinary incontinence: I suspect that there are chronic changes contributing to this.  I do not expect that there is an acute infection based on the situation.  I do suspect strongly that symptoms are worsened due to continued regular usage of cough and cold medications containing antihistamines.  At this point in time I recommend discontinuation of any antihistamines.  We will move away from typical over-the-counter cough and cold medications and utilize medications as described above for symptom relief.  If symptoms worsen we will need to consider more acute evaluation as well as potentially longer-term evaluation for causes.  Discussed with patient considerations regarding protective undergarments and the " rationale for my recommendations.  Subjective:     HPI:    Jesika Paez is a 77 year old female is here today scheduled for evaluation of ongoing cough, nasal congestion sore throat and sneezing.  Patient reports that for now nearly 3 months she has had symptoms that have waxed and waned to some extent but are more acutely worsened.  She reports using Advil Cold and Sinus as well as NyQuil at night.  She reports these medications have given her some symptom relief.  She called yesterday to schedule appointment to have the symptoms evaluated.    She reports she fell overnight injuring her knees and left arm.  She states she got up in the middle of the night because she was coughing and fell in her bathroom.  She states she is able to walk and bear weight she does not have significant pain but has tenderness and bruising on both kneecaps.  Has some discomfort in the left arm.      She reports concerns with urinary incontinence.  She reports these are chronic symptoms that have acutely worsened over the same timeframe that she has been dealing with these respiratory infection symptoms.  Reports symptoms are primarily urge incontinence but also some degree of overflow incontinence.  She denies dysuria, denies hematuria or other significant concerns.        ROS:  Complete review of systems is obtained.  Other than the specific considerations noted above complete review of systems is negative.          Objective:   Medications:  Current Outpatient Medications   Medication     Ascorbic Acid (VITAMIN C) 500 MG CAPS     atorvastatin (LIPITOR) 20 MG tablet     Biotin 10 MG CAPS     calcium carbonate-vitamin D3 (CALCIUM 600 WITH VITAMIN D3) 600 mg(1,500mg) -200 unit per tablet     cholecalciferol, vitamin D3, 2,000 unit Tab     COLLAGEN PO     Flaxseed (Linseed) (FLAX SEED OIL) 1000 MG capsule     Protein POWD     UNABLE TO FIND     UNABLE TO FIND     azithromycin (ZITHROMAX) 250 MG tablet     benzonatate (TESSALON) 100  MG capsule     predniSONE (DELTASONE) 10 MG tablet     saw palmetto 450 MG CAPS capsule     No current facility-administered medications for this visit.        Allergies:     Allergies   Allergen Reactions     Codeine Sulfate [Codeine] Unknown        Social History     Socioeconomic History     Marital status:      Spouse name: Not on file     Number of children: Not on file     Years of education: Not on file     Highest education level: Not on file   Occupational History     Not on file   Tobacco Use     Smoking status: Former     Packs/day: 1.00     Years: 30.00     Pack years: 30.00     Types: Cigarettes     Start date: 1982     Quit date: 2012     Years since quittin.0     Smokeless tobacco: Never   Substance and Sexual Activity     Alcohol use: No     Drug use: No     Sexual activity: Not on file   Other Topics Concern     Not on file   Social History Narrative    Patient has been  4 times.  She lives with her son.  Her younger son committed suicide at the age of 24.     Social Determinants of Health     Financial Resource Strain: Not on file   Food Insecurity: Not on file   Transportation Needs: Not on file   Physical Activity: Not on file   Stress: Not on file   Social Connections: Not on file   Intimate Partner Violence: Not on file   Housing Stability: Not on file       Family History   Problem Relation Age of Onset     Dementia Mother      Hearing Loss Mother      Heart Disease Father      Hyperlipidemia Father      Hypertension Father      Seizure Disorder Sister      Pneumonia Brother      Arthritis Brother      Chronic Infections Brother      Suicidality Son         Most Recent Immunizations   Administered Date(s) Administered     COVID-19 Vaccine 18+ (Moderna) 10/20/2022     COVID-19 Vaccine Bivalent Booster 18+ (Moderna) 10/20/2022     FLU 6-35 months 10/07/2010     FLUAD(HD)65+ QUAD 2021     Flu 65+ Years 2019     Flu, Unspecified 10/22/2020     Influenza  "(H1N1) 01/12/2010     Influenza (High Dose) 3 valent vaccine 09/14/2017     Influenza Vaccine 65+ (Fluzone HD) 09/13/2022     Pneumo Conj 13-V (2010&after) 09/14/2017     Pneumococcal 23 valent 10/22/2020     TDAP Vaccine (Boostrix) 11/05/2020     Td (Adult), Adsorbed 10/07/2010     Td,adult,historic,unspecified 10/07/2010     Zoster vaccine recombinant adjuvanted (SHINGRIX) 07/17/2018     Zoster vaccine, live 04/15/2011        Wt Readings from Last 3 Encounters:   01/31/23 72.6 kg (160 lb)   09/13/22 70.5 kg (155 lb 8 oz)   07/19/22 71.7 kg (158 lb)        BP Readings from Last 6 Encounters:   01/31/23 126/74   09/13/22 124/68   07/19/22 130/80   08/23/21 128/72        No results found for: A1C, HEMOGLOBINA1           PHYSICAL EXAM:    /74   Pulse 64   Temp 98.1  F (36.7  C)   Resp 18   Ht 1.6 m (5' 3\")   Wt 72.6 kg (160 lb)   SpO2 95%   BMI 28.34 kg/m       General: Patient no signs of distress    HEENT: No lesions in the mouth or pharynx neck is supple without palpable lymphadenopathy no conjunctival irritation or scleral icterus    Lungs: Good air movement throughout no wheeze crackle or other focal sounds coughs occasionally during exam    Heart: Regular rate and rhythm no murmur    Extremities examination of her upper extremities reveals full range of motion there is some tenderness in the shoulder region primarily in the upper humerus.  No distinct bruising skin breakdown or other concerns noted.    Examination lower extremities reveals no pain on palpation of the hip joints in the trochanteric region.  Good range of motion of both hips without significant discomfort.  Bruising noted on the anterior surface of both kneecaps with skin intact.  Tender to the touch in this region focally no bony deformities.  Patient can fully flex and extend the knees with only minimal discomfort.  She can bear weight with both legs and walks normally including climbing up and down from the examination " table.

## 2023-01-31 NOTE — PATIENT INSTRUCTIONS
Stop taking the Advil cold and sinus and the NyQuil.  I believe these medications place you at higher risk for falling if you have to wake up at night and I believe that they are responsible for causing the urinary symptoms you described.    On your chest x-ray I do not see any distinct pneumonia process but obviously you have ongoing infection process that I think is most consistent with bronchitis.  Given the timeframe we will treat you with an antibiotic medication called azithromycin.    For relieving your symptoms I recommend you obtain fluticasone (Flonase) nasal spray over-the-counter from your pharmacy and use according to the package instructions.  I believe this medication will help with the nasal congestion symptoms specifically.    In addition to the antibiotic I will prescribe benzonatate which is a cough relieving medication that she can take up to 3 times a day as needed.  This is a small capsule that she swallowed.    I will also prescribe prednisone 10 mg once daily for 5 days which I think will help to settle with your symptoms overall including the cough.    If your symptoms worsen in any regard or new symptoms develop please let me know.

## 2023-02-09 ENCOUNTER — MYC MEDICAL ADVICE (OUTPATIENT)
Dept: FAMILY MEDICINE | Facility: CLINIC | Age: 78
End: 2023-02-09
Payer: COMMERCIAL

## 2023-02-09 ENCOUNTER — NURSE TRIAGE (OUTPATIENT)
Dept: FAMILY MEDICINE | Facility: CLINIC | Age: 78
End: 2023-02-09
Payer: COMMERCIAL

## 2023-02-09 NOTE — TELEPHONE ENCOUNTER
"Nurse Triage SBAR    Is this a 2nd Level Triage? YES, LICENSED PRACTITIONER REVIEW IS REQUIRED    Situation:   From [facility] message today:  \"I still have a runny nose and coughing. What can I take to get rid of this, please!\"    Was seen on 1/31/23 and has 2 cough capsules left and nasal spray, but it is not helping much. Got better for a couple of days then symptoms worsened again.    Background:   CXR was clear on 1/31/23.    Benadryl in her cold and cough syrup was making her loss her balance and falling so she was told to stop taking it when she was seen on 1/31/23.    No Asthma.    Assessment:   Productive cough- with white phlegm.  Ribs were hurting from coughing a lot.  Sneezing a lot too.  Nasal congestion and runny nose.  No fever.  Had cold sweats yesterday.  No SOB or difficulty breathing.  Chest pain from coughing.  Right ear was \"snapping\" from blowing nose a lot.  Sore throat is better; ST from coughing.  No blood in sputum.    Protocol Recommended Disposition:   No disposition on file.    Recommendation:   Care advices given. Patient was wondering if provider could please prescribe something for her cough and her runny nose. Please advise.    OK to leave detailed message at mobile number.     Routed to provider    Does the patient meet one of the following criteria for ADS visit consideration? 16+ years old, with an [facility] PCP     TIP  Providers, please consider if this condition is appropriate for management at one of our Acute and Diagnostic Services sites.     If patient is a good candidate, please use dotphrase <dot>triageresponse and select Refer to ADS to document.    Reason for Disposition    Cough is main symptom    Continuous (nonstop) coughing interferes with work or school and no improvement using cough treatment per Care Advice    Additional Information    Negative: SEVERE difficulty breathing (e.g., struggling for each breath, speaks in single words)    Negative: Very weak (can't stand)    " Negative: Sounds like a life-threatening emergency to the triager    Negative: Difficulty breathing and not from stuffy nose (e.g., not relieved by cleaning out the nose)    Negative: Runny nose is caused by pollen or other allergies    Negative: Bluish (or gray) lips or face    Negative: SEVERE difficulty breathing (e.g., struggling for each breath, speaks in single words)    Negative: Rapid onset of cough and has hives    Negative: Coughing started suddenly after medicine, an allergic food or bee sting    Negative: Difficulty breathing after exposure to flames, smoke, or fumes    Negative: Sounds like a life-threatening emergency to the triager    Negative: Previous asthma attacks and this feels like asthma attack    Negative: Dry cough (non-productive; no sputum or minimal clear sputum) and within 14 days of COVID-19 Exposure    Negative: MODERATE difficulty breathing (e.g., speaks in phrases, SOB even at rest, pulse 100-120) and still present when not coughing    Negative: Chest pain present when not coughing    Negative: Passed out (i.e., fainted, collapsed and was not responding)    Negative: Patient sounds very sick or weak to the triager    Negative: MILD difficulty breathing (e.g., minimal/no SOB at rest, SOB with walking, pulse <100) and still present when not coughing    Negative: Coughed up > 1 tablespoon (15 ml) blood (Exception: Blood-tinged sputum.)    Negative: Fever > 103 F (39.4 C)    Negative: Fever > 101 F (38.3 C) and over 60 years of age    Negative: Fever > 100.0 F (37.8 C) and has diabetes mellitus or a weak immune system (e.g., HIV positive, cancer chemotherapy, organ transplant, splenectomy, chronic steroids)    Negative: Fever > 100.0 F (37.8 C) and bedridden (e.g., nursing home patient, stroke, chronic illness, recovering from surgery)    Negative: Increasing ankle swelling    Negative: Wheezing is present    Negative: SEVERE coughing spells (e.g., whooping sound after coughing, vomiting  after coughing)    Negative: Coughing up last-colored (reddish-brown) or blood-tinged sputum    Negative: Fever present > 3 days (72 hours)    Negative: Fever returns after gone for over 24 hours and symptoms worse or not improved    Negative: Using nasal washes and pain medicine > 24 hours and sinus pain persists    Negative: Known COPD or other severe lung disease (i.e., bronchiectasis, cystic fibrosis, lung surgery) and worsening symptoms (i.e., increased sputum purulence or amount, increased breathing difficulty)    Protocols used: COMMON COLD-A-OH, COUGH-A-OH    RODDY BriscoeN, RN  [facility]

## 2023-02-10 ENCOUNTER — OFFICE VISIT (OUTPATIENT)
Dept: FAMILY MEDICINE | Facility: CLINIC | Age: 78
End: 2023-02-10
Payer: COMMERCIAL

## 2023-02-10 VITALS
DIASTOLIC BLOOD PRESSURE: 70 MMHG | BODY MASS INDEX: 27.81 KG/M2 | HEART RATE: 90 BPM | RESPIRATION RATE: 18 BRPM | OXYGEN SATURATION: 96 % | TEMPERATURE: 97.8 F | SYSTOLIC BLOOD PRESSURE: 134 MMHG | WEIGHT: 157 LBS

## 2023-02-10 DIAGNOSIS — R05.3 CHRONIC COUGH: ICD-10-CM

## 2023-02-10 DIAGNOSIS — J01.90 ACUTE SINUSITIS WITH SYMPTOMS > 10 DAYS: Primary | ICD-10-CM

## 2023-02-10 PROCEDURE — 99214 OFFICE O/P EST MOD 30 MIN: CPT | Performed by: FAMILY MEDICINE

## 2023-02-10 RX ORDER — PREDNISONE 20 MG/1
20 TABLET ORAL DAILY
Qty: 5 TABLET | Refills: 0 | Status: SHIPPED | OUTPATIENT
Start: 2023-02-10 | End: 2023-02-15

## 2023-02-10 RX ORDER — BENZONATATE 200 MG/1
200 CAPSULE ORAL 3 TIMES DAILY PRN
Qty: 30 CAPSULE | Refills: 1 | Status: SHIPPED | OUTPATIENT
Start: 2023-02-10 | End: 2023-07-18

## 2023-02-10 RX ORDER — DOXYCYCLINE HYCLATE 100 MG
100 TABLET ORAL 2 TIMES DAILY
Qty: 20 TABLET | Refills: 0 | Status: SHIPPED | OUTPATIENT
Start: 2023-02-10 | End: 2023-02-20

## 2023-02-10 ASSESSMENT — PAIN SCALES - GENERAL: PAINLEVEL: NO PAIN (0)

## 2023-02-10 NOTE — TELEPHONE ENCOUNTER
Recommend office visit (in person or virtual) before weekend in order to further evaluate regarding ongoing symptoms since 1/31/23.

## 2023-06-21 ENCOUNTER — OFFICE VISIT (OUTPATIENT)
Dept: AUDIOLOGY | Facility: CLINIC | Age: 78
End: 2023-06-21
Payer: COMMERCIAL

## 2023-06-21 DIAGNOSIS — H90.3 SENSORINEURAL HEARING LOSS, BILATERAL: Primary | ICD-10-CM

## 2023-06-21 PROCEDURE — 92593 PR HEARING AID CHECK, BINAURAL: CPT | Performed by: AUDIOLOGIST

## 2023-06-21 NOTE — PROGRESS NOTES
AUDIOLOGY REPORT    SUBJECTIVE: Jesika Paez is a 77 year old female who was seen in the Audiology Clinic at the [facility] on 6/21/2023 for a hearing aid check. Hearing was last assessed on 3/1/18 and results revealed moderate sensorineural hearing loss in the right ear and moderate rising to mild sloping to moderately-severe sensorineural hearing loss in the left ear. Jesika was fit with a pair of [employer] B70-R SAW hearing aids on 5/16/18. She was fit with canal lock cShell earmolds on 7/12/18.     Jesika reports that she hasn't been hearing as well with her hearing aids as she used to. She states that when she increases the volume on her hearing aids she hears too much background noise.    OBJECTIVE:   Otoscopy reveals clear canals in both ears. A listening check of the hearing aids reveals good sound quality for both devices.    The hearing aids are connected to the computer. The overall gain of the hearing aids is increased from 90% to 100%. Jesika reports that this volume setting is comfortable. When the volume is increased to 110% she reports that her voice echoes so the volume is set back at 100%.     Jesika is provided additional wax traps today.     ASSESSMENT: Bilateral hearing aid check completed. The above changes were made to the hearing aids.    PLAN: Jesika will return for follow-up as needed. It is recommended that she return soon for a hearing test since her most recent hearing test is from 2018. She plans to see how things go with the hearing aid adjustments made today and she will return for a hearing test if she continues to have issues hearing. Please call this clinic with any questions regarding today s appointment.    Oscar Dallas., [facility]  [place] Licensed Audiologist #[id number]

## 2023-07-18 ENCOUNTER — OFFICE VISIT (OUTPATIENT)
Dept: FAMILY MEDICINE | Facility: CLINIC | Age: 78
End: 2023-07-18
Payer: COMMERCIAL

## 2023-07-18 VITALS
BODY MASS INDEX: 26.88 KG/M2 | OXYGEN SATURATION: 98 % | HEART RATE: 62 BPM | HEIGHT: 63 IN | SYSTOLIC BLOOD PRESSURE: 122 MMHG | TEMPERATURE: 98.6 F | WEIGHT: 151.7 LBS | RESPIRATION RATE: 18 BRPM | DIASTOLIC BLOOD PRESSURE: 72 MMHG

## 2023-07-18 DIAGNOSIS — Z87.891 PERSONAL HISTORY OF TOBACCO USE, PRESENTING HAZARDS TO HEALTH: ICD-10-CM

## 2023-07-18 DIAGNOSIS — M25.511 CHRONIC PAIN OF BOTH SHOULDERS: Primary | ICD-10-CM

## 2023-07-18 DIAGNOSIS — M25.512 CHRONIC PAIN OF BOTH SHOULDERS: Primary | ICD-10-CM

## 2023-07-18 DIAGNOSIS — M79.651 PAIN IN BOTH THIGHS: ICD-10-CM

## 2023-07-18 DIAGNOSIS — H91.93 BILATERAL HEARING LOSS, UNSPECIFIED HEARING LOSS TYPE: ICD-10-CM

## 2023-07-18 DIAGNOSIS — G89.29 CHRONIC PAIN OF BOTH SHOULDERS: Primary | ICD-10-CM

## 2023-07-18 DIAGNOSIS — M79.652 PAIN IN BOTH THIGHS: ICD-10-CM

## 2023-07-18 DIAGNOSIS — E78.5 HYPERLIPIDEMIA, UNSPECIFIED HYPERLIPIDEMIA TYPE: ICD-10-CM

## 2023-07-18 LAB
ALBUMIN SERPL BCG-MCNC: 4.8 G/DL (ref 3.5–5.2)
ALP SERPL-CCNC: 42 U/L (ref 35–104)
ALT SERPL W P-5'-P-CCNC: 29 U/L (ref 0–50)
ANION GAP SERPL CALCULATED.3IONS-SCNC: 12 MMOL/L (ref 7–15)
AST SERPL W P-5'-P-CCNC: 36 U/L (ref 0–45)
BASOPHILS # BLD AUTO: 0.1 10E3/UL (ref 0–0.2)
BASOPHILS NFR BLD AUTO: 1 %
BILIRUB SERPL-MCNC: 1.4 MG/DL
BUN SERPL-MCNC: 14.5 MG/DL (ref 8–23)
CALCIUM SERPL-MCNC: 9.7 MG/DL (ref 8.8–10.2)
CHLORIDE SERPL-SCNC: 104 MMOL/L (ref 98–107)
CHOLEST SERPL-MCNC: 168 MG/DL
CREAT SERPL-MCNC: 0.66 MG/DL (ref 0.51–0.95)
CRP SERPL-MCNC: <3 MG/L
DEPRECATED HCO3 PLAS-SCNC: 25 MMOL/L (ref 22–29)
EOSINOPHIL # BLD AUTO: 0.2 10E3/UL (ref 0–0.7)
EOSINOPHIL NFR BLD AUTO: 2 %
ERYTHROCYTE [DISTWIDTH] IN BLOOD BY AUTOMATED COUNT: 12.1 % (ref 10–15)
ERYTHROCYTE [SEDIMENTATION RATE] IN BLOOD BY WESTERGREN METHOD: 10 MM/HR (ref 0–30)
GFR SERPL CREATININE-BSD FRML MDRD: 90 ML/MIN/1.73M2
GLUCOSE SERPL-MCNC: 97 MG/DL (ref 70–99)
HCT VFR BLD AUTO: 41.2 % (ref 35–47)
HDLC SERPL-MCNC: 70 MG/DL
HGB BLD-MCNC: 14.2 G/DL (ref 11.7–15.7)
IMM GRANULOCYTES # BLD: 0 10E3/UL
IMM GRANULOCYTES NFR BLD: 0 %
LDLC SERPL CALC-MCNC: 84 MG/DL
LYMPHOCYTES # BLD AUTO: 2.9 10E3/UL (ref 0.8–5.3)
LYMPHOCYTES NFR BLD AUTO: 40 %
MCH RBC QN AUTO: 32.8 PG (ref 26.5–33)
MCHC RBC AUTO-ENTMCNC: 34.5 G/DL (ref 31.5–36.5)
MCV RBC AUTO: 95 FL (ref 78–100)
MONOCYTES # BLD AUTO: 0.5 10E3/UL (ref 0–1.3)
MONOCYTES NFR BLD AUTO: 7 %
NEUTROPHILS # BLD AUTO: 3.6 10E3/UL (ref 1.6–8.3)
NEUTROPHILS NFR BLD AUTO: 50 %
NONHDLC SERPL-MCNC: 98 MG/DL
PLATELET # BLD AUTO: 211 10E3/UL (ref 150–450)
POTASSIUM SERPL-SCNC: 4.9 MMOL/L (ref 3.4–5.3)
PROT SERPL-MCNC: 7.2 G/DL (ref 6.4–8.3)
RBC # BLD AUTO: 4.33 10E6/UL (ref 3.8–5.2)
SODIUM SERPL-SCNC: 141 MMOL/L (ref 136–145)
TRIGL SERPL-MCNC: 71 MG/DL
WBC # BLD AUTO: 7.3 10E3/UL (ref 4–11)

## 2023-07-18 PROCEDURE — 85652 RBC SED RATE AUTOMATED: CPT | Performed by: FAMILY MEDICINE

## 2023-07-18 PROCEDURE — 36415 COLL VENOUS BLD VENIPUNCTURE: CPT | Performed by: FAMILY MEDICINE

## 2023-07-18 PROCEDURE — 80053 COMPREHEN METABOLIC PANEL: CPT | Performed by: FAMILY MEDICINE

## 2023-07-18 PROCEDURE — 86140 C-REACTIVE PROTEIN: CPT | Performed by: FAMILY MEDICINE

## 2023-07-18 PROCEDURE — 80061 LIPID PANEL: CPT | Performed by: FAMILY MEDICINE

## 2023-07-18 PROCEDURE — 85025 COMPLETE CBC W/AUTO DIFF WBC: CPT | Performed by: FAMILY MEDICINE

## 2023-07-18 PROCEDURE — 99214 OFFICE O/P EST MOD 30 MIN: CPT | Performed by: FAMILY MEDICINE

## 2023-07-18 RX ORDER — UREA 10 %
500 LOTION (ML) TOPICAL WEEKLY
COMMUNITY
End: 2024-06-20

## 2023-07-18 ASSESSMENT — PAIN SCALES - GENERAL: PAINLEVEL: NO PAIN (0)

## 2023-07-18 NOTE — PROGRESS NOTES
"Jesika Paez  /72   Pulse 62   Temp 98.6  F (37  C)   Resp 18   Ht 1.6 m (5' 3\")   Wt 68.8 kg (151 lb 11.2 oz)   SpO2 98%   BMI 26.87 kg/m       Assessment/Plan:                Jesika was seen today for arm pain and numbness.    Diagnoses and all orders for this visit:    Chronic pain of both shoulders  -     CBC with Platelets & Differential  -     CRP, inflammation  -     ESR: Erythrocyte sedimentation rate    Hyperlipidemia, unspecified hyperlipidemia type  -     Comprehensive metabolic panel  -     Lipid panel reflex to direct LDL Fasting    Personal history of tobacco use, presenting hazards to health  -     CT Chest Lung Cancer Scrn Low Dose wo; Future    Pain in both thighs  -     CBC with Platelets & Differential  -     CRP, inflammation  -     ESR: Erythrocyte sedimentation rate    Bilateral hearing loss, unspecified hearing loss type  -     Adult Audiology  Referral; Future         DISCUSSION  I'm concerned with her ongoing shoulder pain and stiffness now with upper leg pain around the hip girl that this may be indicative of polymyalgia rheumatica. Will check CRP and sedimentation rate. Discussed the potential for this diagnosis. Other possibilities certainly exist. Pending lab test results will decide on further course of action. Given that PMR is a consideration I do not think that there is a high likelihood that she would have temporal arteritis. Will await lab test results and make further plans. Will obtain other labs and make referrals as mentioned below and ordered above.  Subjective:     HPI:    Jesika Paez is a 77 year old female medical strengthens history of tobacco use and hyperlipidemia. She is here today primarily concerned about bilateral shoulder pain ongoing for one month and pain in the hips and upper legs today. Patient reports no injury. She reports pain is stiffness beginning seemingly on the right and now spreading to the left. She reports a lot " of difficulty moving her arms. Patient reports a high degree of stiffness in the morning upon waking and loosens up slightly but has persistent pain and dysfunction of the upper extremities. Today after walking patient developed more significant stiffness in both upper legs through the hip and buttocks region. This had not been an ongoing concern prior. Patient does report getting headaches perhaps a bit more frequently but her eye care provider thinks it is related to eye problems. She does not report jaw pain or claudication. Patient is a past history of smoking and has undergone routine lung cancer screening we discussed continuing with this. She is on atorvastatin for cholesterol management would like to have cholesterol levels checked today. She needs referral back to audiology due to hearing loss.    ROS:  Complete review of systems is obtained.  Other than the specific considerations noted above complete review of systems is negative.          Objective:   Medications:  Current Outpatient Medications   Medication     Ascorbic Acid (VITAMIN C) 500 MG CAPS     atorvastatin (LIPITOR) 20 MG tablet     Biotin 10 MG CAPS     cholecalciferol, vitamin D3, 2,000 unit Tab     COLLAGEN PO     cyanocobalamin (VITAMIN B-12) 500 MCG tablet     Flaxseed (Linseed) (FLAX SEED OIL) 1000 MG capsule     Probiotic Product (PROBIOTIC-10 PO)     Protein POWD     saw palmetto 450 MG CAPS capsule     UNABLE TO FIND     UNABLE TO FIND     No current facility-administered medications for this visit.        Allergies:     Allergies   Allergen Reactions     Codeine Sulfate [Codeine] Unknown        Social History     Socioeconomic History     Marital status:      Spouse name: Not on file     Number of children: Not on file     Years of education: Not on file     Highest education level: Not on file   Occupational History     Not on file   Tobacco Use     Smoking status: Former     Packs/day: 1.00     Years: 30.00     Pack years:  30.00     Types: Cigarettes     Start date: 1982     Quit date: 2012     Years since quittin.4     Smokeless tobacco: Never   Substance and Sexual Activity     Alcohol use: No     Drug use: No     Sexual activity: Not on file   Other Topics Concern     Not on file   Social History Narrative    Patient has been  4 times.  She lives with her son.  Her younger son committed suicide at the age of 24.     Social Determinants of Health     Financial Resource Strain: Not on file   Food Insecurity: Not on file   Transportation Needs: Not on file   Physical Activity: Not on file   Stress: Not on file   Social Connections: Not on file   Intimate Partner Violence: Not on file   Housing Stability: Not on file       Family History   Problem Relation Age of Onset     Dementia Mother      Hearing Loss Mother      Heart Disease Father      Hyperlipidemia Father      Hypertension Father      Seizure Disorder Sister      Pneumonia Brother      Arthritis Brother      Chronic Infections Brother      Suicidality Son         Most Recent Immunizations   Administered Date(s) Administered     COVID-19 Bivalent 18+ (Moderna) 10/20/2022     COVID-19 Monovalent 18+ (Moderna) 10/20/2022     FLU 6-35 months 10/07/2010     FLUAD(HD)65+ QUAD 2021     Flu 65+ Years 2019     Flu, Unspecified 10/22/2020     Influenza (H1N1) 2010     Influenza (High Dose) 3 valent vaccine 2017     Influenza Vaccine 65+ (Fluzone HD) 2022     Pneumo Conj 13-V (2010&after) 2017     Pneumococcal 23 valent 10/22/2020     TDAP Vaccine (Boostrix) 2020     Td (Adult), Adsorbed 10/07/2010     Td,adult,historic,unspecified 10/07/2010     Zoster recombinant adjuvanted (SHINGRIX) 2018     Zoster vaccine, live 04/15/2011        Wt Readings from Last 3 Encounters:   23 68.8 kg (151 lb 11.2 oz)   02/10/23 71.2 kg (157 lb)   23 72.6 kg (160 lb)        BP Readings from Last 6 Encounters:   23 122/72  "  02/10/23 134/70   01/31/23 126/74   09/13/22 124/68   07/19/22 130/80   08/23/21 128/72        No results found for: A1C, HEMOGLOBINA1           PHYSICAL EXAM:    /72   Pulse 62   Temp 98.6  F (37  C)   Resp 18   Ht 1.6 m (5' 3\")   Wt 68.8 kg (151 lb 11.2 oz)   SpO2 98%   BMI 26.87 kg/m       Gen.: patient alert no signs of distress    HEENT no tenderness over the temporal arteries. No pain on palpation around the jaw area. Neck is supple without lymphadenopathy or masses    Lungs: good air movement was a crackle    Heart: regular rate and rhythm no murmur    Examination of the operation of bruising feels pain and stiffness with most movements. With more passive movement patient has more tolerance for movement. There is nothing that suggests specific rotator cuff tendinopathy but more diffuse muscle pain. There is no evidence of frozen shoulder or other similar findings no evidence of impingement. Strength is difficult to test given the degree of discomfort. Patient is able to get up from the examination chair without significant difficulty. She can heal and to walk as well as walk with a normal gait overall.                              Answers for HPI/ROS submitted by the patient on 7/11/2023  How many servings of fruits and vegetables do you eat daily?: 2-3  On average, how many sweetened beverages do you drink each day (Examples: soda, juice, sweet tea, etc.  Do NOT count diet or artificially sweetened beverages)?: 0  How many minutes a day do you exercise enough to make your heart beat faster?: 30 to 60  How many days a week do you exercise enough to make your heart beat faster?: 7  How many days per week do you miss taking your medication?: 0  What is the reason for your visit today?: Pain between my shoulders and elbows on both arms  When did your symptoms begin?: More than a month  What are your symptoms?: Pain when lifting my arms  How would you describe these symptoms?: Severe  Are your " symptoms:: Staying the same  Have you had these symptoms before?: No  Is there anything that makes you feel worse?: Sleeping on them  Is there anything that makes you feel better?: Not moving or laying on them

## 2023-07-20 DIAGNOSIS — M25.512 CHRONIC PAIN OF BOTH SHOULDERS: Primary | ICD-10-CM

## 2023-07-20 DIAGNOSIS — M25.511 CHRONIC PAIN OF BOTH SHOULDERS: Primary | ICD-10-CM

## 2023-07-20 DIAGNOSIS — G89.29 CHRONIC PAIN OF BOTH SHOULDERS: Primary | ICD-10-CM

## 2023-07-21 ENCOUNTER — HOSPITAL ENCOUNTER (OUTPATIENT)
Dept: CT IMAGING | Facility: HOSPITAL | Age: 78
Discharge: HOME | End: 2023-07-21
Attending: FAMILY MEDICINE | Admitting: FAMILY MEDICINE
Payer: COMMERCIAL

## 2023-07-21 DIAGNOSIS — Z87.891 PERSONAL HISTORY OF TOBACCO USE, PRESENTING HAZARDS TO HEALTH: ICD-10-CM

## 2023-07-21 PROCEDURE — 71271 CT THORAX LUNG CANCER SCR C-: CPT

## 2023-08-22 ENCOUNTER — TRANSFERRED RECORDS (OUTPATIENT)
Dept: HEALTH INFORMATION MANAGEMENT | Facility: CLINIC | Age: 78
End: 2023-08-22
Payer: COMMERCIAL

## 2023-09-07 NOTE — PROGRESS NOTES
Assessment/Plan:    Acute sinusitis with symptoms > 10 days  Concern for potential sinusitis etiology with postnasal drainage for ongoing cough as noted.  Doxycycline 100 mg twice daily x10 days.  - doxycycline hyclate (VIBRA-TABS) 100 MG tablet  Dispense: 20 tablet; Refill: 0    Chronic cough  Chronic cough with history of smoking.  Has quit several years ago however likely greater than 30-pack-year history at least.  Recent chest x-ray negative.  Patient declines referral to see pulmonologist.  Has inhaler available at home for as needed use.  Increase Tessalon up to 200 mg capsule 3 times daily.  Prednisone 20 mg daily x5 days as well for cough question associated with COPD exacerbation.  - benzonatate (TESSALON) 200 MG capsule  Dispense: 30 capsule; Refill: 1  - predniSONE (DELTASONE) 20 MG tablet  Dispense: 5 tablet; Refill: 0          Subjective:    Jesika Castillonamrata is seen today for ongoing concerns for cough.  Symptoms over past several months in fact.  Longstanding smoker greater than 30-pack-year history likely.  Quit smoking a few years ago.  Was seen January 31, 2023 with concerns for bronchitis.  Z-Darwin utilized.  Prednisone 10 mg daily x5 days.  Tessalon 100 mg capsules on as-needed basis.  Still having persistent cough.  Phlegm production at times.  No shortness of breath.  Denies fever.  Comprehensive review of systems as above otherwise all negative.      Past Surgical History:   Procedure Laterality Date     ANKLE FRACTURE SURGERY       CHOLECYSTECTOMY       FRACTURE SURGERY       HC OPEN TX BIMALLEOLAR ANKLE FX, INCL INTERNAL FIXATION      Description: Open Treatment Of Bimalleolar Ankle Fracture;  Recorded: 01/12/2010;     HC REMOVAL GALLBLADDER      Description: Cholecystectomy;  Recorded: 01/12/2010;     HC REMOVE TONSILS/ADENOIDS,<13 Y/O      Description: Tonsillectomy With Adenoidectomy;  Recorded: 01/12/2010;     HYSTERECTOMY       ZZC ARVIZU W/O FACETEC FORAMOT/DSKC 1/2 VRT SEG, LUMBAR       Description: Laminectomy Decompressive Up To Two Lumbar Segments;  Recorded: 2010;  Comments: X2 SEPARATE BACK SURGERIES     [facility] LIGATE FALLOPIAN TUBE      Description: Tubal Ligation;  Recorded: 2010;     [facility] TOTAL ABDOM HYSTERECTOMY      Description: Total Abdominal Hysterectomy;  Recorded: 2010;     [facility] TOTAL ABDOM HYSTERECTOMY      Description: Hysterectomy;  Recorded: 2010;        Family History   Problem Relation Age of Onset     Dementia Mother      Hearing Loss Mother      Heart Disease Father      Hyperlipidemia Father      Hypertension Father      Seizure Disorder Sister      Pneumonia Brother      Arthritis Brother      Chronic Infections Brother      Suicidality Son         No past medical history on file.     Social History     Tobacco Use     Smoking status: Former     Packs/day: 1.00     Years: 30.00     Pack years: 30.00     Types: Cigarettes     Start date: 1982     Quit date: 2012     Years since quittin.0     Smokeless tobacco: Never   Substance Use Topics     Alcohol use: No     Drug use: No        Current Outpatient Medications   Medication Sig Dispense Refill     Ascorbic Acid (VITAMIN C) 500 MG CAPS        atorvastatin (LIPITOR) 20 MG tablet TAKE ONE TABLET BY MOUTH EVERY DAY 90 tablet 3     benzonatate (TESSALON) 200 MG capsule Take 1 capsule (200 mg) by mouth 3 times daily as needed for cough 30 capsule 1     Biotin 10 MG CAPS        calcium carbonate-vitamin D3 (CALCIUM 600 WITH VITAMIN D3) 600 mg(1,500mg) -200 unit per tablet [CALCIUM CARBONATE-VITAMIN D3 (CALCIUM 600 WITH VITAMIN D3) 600 MG(1,500MG) -200 UNIT PER TABLET] Take 1 tablet by mouth daily. (Patient taking differently: Take 2 tablets by mouth daily)  0     cholecalciferol, vitamin D3, 2,000 unit Tab [CHOLECALCIFEROL, VITAMIN D3, 2,000 UNIT TAB] 2 tabs a day 100 each 3     COLLAGEN PO 6000mg per day       doxycycline hyclate (VIBRA-TABS) 100 MG tablet Take 1 tablet (100 mg) by mouth 2 times  daily for 10 days 20 tablet 0     Flaxseed (Linseed) (FLAX SEED OIL) 1000 MG capsule Take 1 capsule by mouth daily       predniSONE (DELTASONE) 20 MG tablet Take 1 tablet (20 mg) by mouth daily for 5 days 5 tablet 0     Protein POWD Uses a whey blend       saw palmetto 450 MG CAPS capsule Take 450 mg by mouth daily       UNABLE TO FIND MEDICATION NAME: Justina       UNABLE TO FIND MEDICATION NAME: justina herb 500mg            Objective:    Vitals:    02/10/23 0806 02/10/23 0808   BP: (!) 140/60 134/70   Pulse: 90    Resp: 18    Temp: 97.8  F (36.6  C)    SpO2: 96%    Weight: 71.2 kg (157 lb)       Body mass index is 27.81 kg/m .    Alert.  No apparent distress.  Transfers independently.  Chest with diminished breath sounds.  Scattered expiratory wheeze only.  No inspiratory crackle.  Cardiac exam regular without tachycardia.  HEENT exam with cerumen removed from right external auditory canal otherwise TMs appear normal bilaterally.  Nasopharynx with increased congestion bilateral with scant postnasal drainage on oropharyngeal exam.  Neck supple.        EXAM: XR CHEST 2 VIEWS  LOCATION: [facility]  DATE/TIME: 1/31/2023 9:11 AM     INDICATION:  Acute cough.  COMPARISON: Chest CT 02/24/2022.                                                                   IMPRESSION: Lungs are clear. Normal heart size. Aortic atherosclerosis. No pleural effusion.          This note has been dictated using voice recognition software and as a result may contain minor grammatical errors and unintended word substitutions.       Answers for HPI/ROS submitted by the patient on 2/10/2023  What is the reason for your visit today? : illness  How many servings of fruits and vegetables do you eat daily?: 4 or more  On average, how many sweetened beverages do you drink each day (Examples: soda, juice, sweet tea, etc.  Do NOT count diet or artificially sweetened beverages)?: 6  How many minutes a day do you exercise  enough to make your heart beat faster?: 30 to 60  How many days a week do you exercise enough to make your heart beat faster?: 7  How many days per week do you miss taking your medication?: 0       Epidermal Closure: running

## 2023-09-16 ASSESSMENT — ENCOUNTER SYMPTOMS
CONSTIPATION: 0
PARESTHESIAS: 0
EYE PAIN: 0
HEMATOCHEZIA: 0
DIZZINESS: 0
JOINT SWELLING: 0
NAUSEA: 0
FREQUENCY: 1
WEAKNESS: 1
SHORTNESS OF BREATH: 0
SORE THROAT: 0
ARTHRALGIAS: 1
HEARTBURN: 0
PALPITATIONS: 0
CHILLS: 0
HEMATURIA: 0
DYSURIA: 0
FEVER: 0
COUGH: 0
DIARRHEA: 0
MYALGIAS: 1
BREAST MASS: 0
HEADACHES: 1
ABDOMINAL PAIN: 0
NERVOUS/ANXIOUS: 0

## 2023-09-16 ASSESSMENT — ACTIVITIES OF DAILY LIVING (ADL)
CURRENT_FUNCTION: HOUSEWORK REQUIRES ASSISTANCE
CURRENT_FUNCTION: TRANSPORTATION REQUIRES ASSISTANCE

## 2023-09-21 NOTE — COMMUNITY RESOURCES LIST (ENGLISH)
09/21/2023   [facility]  N/A  For questions about this resource list or additional care needs, please contact your primary care clinic or care manager.  Phone: [phone]   Email: N/A   Address: [address] [place] [address]   Hours: N/A        Transportation       Free or low-cost transportation  1  [facility] - Free Bus and Gas Cards Distance: 2.97 miles      Pickup   [address] [place] [address]  Language: English  Hours: Mon - Fri 9:00 AM - 4:00 PM , Sun 9:30 AM - 12:00 PM  Fees: Free   Phone: (806) 137-1948 Email: andi@[facility] Website: http://[facility].org/[facility]/[place]/     2  [facility] Bus Loop Distance: 4.69 miles      In-Person   [address] [place] [address]  Language: English  Hours: Mon - Fri 9:00 AM - 5:00 PM  Fees: Free   Phone: (792) 473-3850 Email: info@[employer] Website: https://www.[employer]/     Transportation to medical appointments  3  Discover Ride Distance: 6.02 miles      In-Person   [address] [address]  Language: English  Hours: Mon - Thu 6:00 AM - 6:00 PM , Fri 6:00 AM - 5:00 PM  Fees: Insurance, Self Pay   Phone: (834) 876-6920 Email: office@[employer] Website: https://www.[employer]/     4  [facility] - Non-Emergency Medical Transportation Distance: 6.99 miles      In-Person   [address] [place] [address]  Language: English  Hours: Mon - Fri 8:00 AM - 4:00 PM Appt. Only  Fees: Self Pay   Phone: (559) 895-9112 Website: http://www.[employer]org/Medical-Services/Emergency-medical-services/Non-emergency-transportation/          Important Numbers & Websites       Emergency Services   911  City Services   311  Poison Control   (420) 166-1668  Suicide Prevention Lifeline   (620) 263-1349 (TALK)  Child Abuse Hotline   (927) 625-7169 (4-A-Child)  Sexual Assault Hotline   (273) 125-1299  (HOPE)  National Runaway Safeline   (664) 514-1146 (RUNAWAY)  All-Options Talkline   (375) 131-4638  Substance Abuse Referral   (842) 250-1105 (HELP)

## 2023-09-22 ENCOUNTER — OFFICE VISIT (OUTPATIENT)
Dept: FAMILY MEDICINE | Facility: CLINIC | Age: 78
End: 2023-09-22
Payer: COMMERCIAL

## 2023-09-22 VITALS
HEART RATE: 61 BPM | SYSTOLIC BLOOD PRESSURE: 126 MMHG | HEIGHT: 63 IN | TEMPERATURE: 98.6 F | DIASTOLIC BLOOD PRESSURE: 78 MMHG | RESPIRATION RATE: 18 BRPM | BODY MASS INDEX: 26.4 KG/M2 | WEIGHT: 149 LBS

## 2023-09-22 DIAGNOSIS — M54.2 CERVICALGIA: ICD-10-CM

## 2023-09-22 DIAGNOSIS — M81.0 OSTEOPOROSIS, UNSPECIFIED OSTEOPOROSIS TYPE, UNSPECIFIED PATHOLOGICAL FRACTURE PRESENCE: ICD-10-CM

## 2023-09-22 DIAGNOSIS — E78.5 HYPERLIPIDEMIA, UNSPECIFIED HYPERLIPIDEMIA TYPE: Primary | ICD-10-CM

## 2023-09-22 DIAGNOSIS — H91.93 BILATERAL HEARING LOSS, UNSPECIFIED HEARING LOSS TYPE: ICD-10-CM

## 2023-09-22 DIAGNOSIS — Z87.891 PERSONAL HISTORY OF TOBACCO USE, PRESENTING HAZARDS TO HEALTH: ICD-10-CM

## 2023-09-22 DIAGNOSIS — M79.601 PAIN OF RIGHT UPPER EXTREMITY: ICD-10-CM

## 2023-09-22 PROCEDURE — G0439 PPPS, SUBSEQ VISIT: HCPCS | Performed by: FAMILY MEDICINE

## 2023-09-22 PROCEDURE — 90662 IIV NO PRSV INCREASED AG IM: CPT | Performed by: FAMILY MEDICINE

## 2023-09-22 PROCEDURE — G0008 ADMIN INFLUENZA VIRUS VAC: HCPCS | Performed by: FAMILY MEDICINE

## 2023-09-22 PROCEDURE — 99213 OFFICE O/P EST LOW 20 MIN: CPT | Mod: 25 | Performed by: FAMILY MEDICINE

## 2023-09-22 RX ORDER — COVID-19 ANTIGEN TEST
220 KIT MISCELLANEOUS 2 TIMES DAILY WITH MEALS
COMMUNITY
End: 2024-05-09

## 2023-09-22 RX ORDER — LACTOBACILLUS RHAMNOSUS GG 10B CELL
1 CAPSULE ORAL 2 TIMES DAILY
COMMUNITY
End: 2024-06-20

## 2023-09-22 RX ORDER — OMEGA-3 FATTY ACIDS/FISH OIL 300-1000MG
200 CAPSULE ORAL EVERY 4 HOURS PRN
COMMUNITY
End: 2023-10-17

## 2023-09-22 ASSESSMENT — PAIN SCALES - GENERAL: PAINLEVEL: NO PAIN (0)

## 2023-09-22 NOTE — PROGRESS NOTES
SUBJECTIVE:   Jesika is a 77 year old who presents for Preventive Visit.    She is osteoporosis. She had been seen by endocrinology. She had been on alendronate for approximately 10 years. They recommend stopping alendronate and undergoing some further workup and arranging for follow-up. Patient has declined to do so at this time. Discussed obtaining a repeat decks to scan she declines at this time. She is on vitamin D. She has elected to stop calcium. She does get a lot of sources of calcium in her diet. No concerns about recent fracture.    She has hyperlipidemia is on a statin. Reviewed most recent cholesterol from July 2023. Blood pressure is under control. No signs or symptoms of vascular disease. She is a former smoker. Had lung cancer screening this past July without any concerns identified. Does not get regular mammography. Today we discussed immunizations.    She is having increasing pain in the right arm and shoulder. This past summer was seen for bilateral shoulder pain underwent a workup did not suggest any inflammatory condition. Pain in the left arm is basically resolved. In the right arm it radiates from the shoulder down to the arm. There are components of her discomfort that seem to be localized to the shoulder but she also reports some discomfort in her neck region along with cracking and popping in the neck. Discussed the possibility that pain in the neck shoulder and arm may all be connected. We discussed the benefits of the spine clinic.    Are you in the first 12 months of your Medicare coverage?  No      Have you ever done Advance Care Planning? (For example, a Health Directive, POLST, or a discussion with a medical provider or your loved ones about your wishes): Yes, advance care planning is on file.    Fall risk  Fallen 2 or more times in the past year?: Yes  Any fall with injury in the past year?: Yes  Timed Up and Go Test (>13.5 is fall risk; contact physician) : 9    Cognitive Screening    1) Repeat 3 items (Leader, Season, Table)    2) Clock draw: NORMAL  3) 3 item recall: Recalls 3 objects  Results: 3 items recalled: COGNITIVE IMPAIRMENT LESS LIKELY    Mini-CogTM Copyright S Jesus. Licensed by the author for use in [facility]; reprinted with permission (miguel@[facility]). All rights reserved.      Do you have sleep apnea, excessive snoring or daytime drowsiness? : no    Reviewed and updated as needed this visit by clinical staff      Reviewed and updated as needed this visit by Provider    Social History     Tobacco Use     Smoking status: Former     Packs/day: 1.00     Years: 30.00     Pack years: 30.00     Types: Cigarettes     Start date: 1982     Quit date: 2012     Years since quittin.6     Smokeless tobacco: Never   Substance Use Topics     Alcohol use: No             2023    11:45 AM   Alcohol Use   Prescreen: >3 drinks/day or >7 drinks/week? Not Applicable          No data to display              Do you have a current opioid prescription? No  Do you use any other controlled substances or medications that are not prescribed by a provider? None        Current providers sharing in care for this patient include:   Patient Care Team:  Abrahan Diez MD as PCP - General (Family Practice)  Abrahan Diez MD as Assigned PCP  Justo Kathleen MD as MD (Endocrinology, Diabetes, and Metabolism)  Justo Kathleen MD as Assigned Endocrinology Provider  Chelsea Andrew AuD as Audiologist (Audiology)    The following health maintenance items are reviewed in Epic and correct as of today:  Health Maintenance   Topic Date Due     ANNUAL REVIEW OF HM ORDERS  Never done     COVID-19 Vaccine (5 - Moderna series) 12/15/2022     LUNG CANCER SCREENING  2024     MEDICARE ANNUAL WELLNESS VISIT  2024     FALL RISK ASSESSMENT  2024     LIPID  2028     ADVANCE CARE PLANNING  2028     DTAP/TDAP/TD IMMUNIZATION (2 - Td or Tdap) 2030     DEXA   "02/10/2037     HEPATITIS C SCREENING  Completed     PHQ-2 (once per calendar year)  Completed     INFLUENZA VACCINE  Completed     Pneumococcal Vaccine: 65+ Years  Completed     ZOSTER IMMUNIZATION  Completed     IPV IMMUNIZATION  Aged Out     HPV IMMUNIZATION  Aged Out     MENINGITIS IMMUNIZATION  Aged Out     COLORECTAL CANCER SCREENING  Discontinued             Pertinent mammograms are reviewed under the imaging tab.    Review of Systems  Complete review of systems is obtained.  Other than the specific considerations noted above complete review of systems is negative.      OBJECTIVE:   /78   Pulse 61   Temp 98.6  F (37  C)   Resp 18   Ht 1.6 m (5' 3\")   Wt 67.6 kg (149 lb)   BMI 26.39 kg/m   Estimated body mass index is 26.39 kg/m  as calculated from the following:    Height as of this encounter: 1.6 m (5' 3\").    Weight as of this encounter: 67.6 kg (149 lb).  Physical Exam          General Appearance:    Alert, cooperative, no distress   Eyes:   No scleral icterus or conjunctival irritation       Ears:    Normal TM's and external ear canals, both ears   Throat:   Lips, mucosa, and tongue normal; teeth and gums normal   Neck:   Supple, symmetrical, trachea midline, no adenopathy;        thyroid:  No enlargement/tenderness/nodules   Lungs:     Clear to auscultation bilaterally, respirations unlabored, no wheezes or crackles   Heart:    Regular rate and rhythm,  No murmur   Abdomen:    Soft, no distention, no tenderness on palpation, no masses, no organomegaly     Extremities:  No edema, no joint swelling or redness, no evidence of any injuries   Skin: multiple small benign moles that are consistent with separate keratoses prominent on the back, neck and trunk   Neurologic:  On gross examination there is no motor or sensory deficit.  Patient walks with a normal gait               ASSESSMENT / PLAN:   Jesika was seen today for recheck medication and wellness visit.    Diagnoses and all orders for this " "visit:    Hyperlipidemia, unspecified hyperlipidemia type    Personal history of tobacco use, presenting hazards to health    Bilateral hearing loss, unspecified hearing loss type    Osteoporosis, unspecified osteoporosis type, unspecified pathological fracture presence    Cervicalgia  -     Spine  Referral; Future    Pain of right upper extremity  -     Spine  Referral; Future    Other orders  -     INFLUENZA VACCINE 65+ (FLUZONE HD)           Patient has been advised of split billing requirements and indicates understanding: Yes    Answers submitted by the patient for this visit:  Annual Preventive Visit (Submitted on 9/16/2023)  Chief Complaint: Annual Exam:  In general, how would you rate your overall physical health?: poor  Frequency of exercise:: 6-7 days/week  Do you usually eat at least 4 servings of fruit and vegetables a day, include whole grains & fiber, and avoid regularly eating high fat or \"junk\" foods? : No  Taking medications regularly:: Yes  Medication side effects:: Muscle aches  Activities of Daily Living: transportation requires assistance, housework requires assistance  Home safety: no safety concerns identified  Hearing Impairment:: need to ask people to speak up or repeat themselves  In the past 6 months, have you been bothered by leaking of urine?: No  In general, how would you rate your overall mental or emotional health?: good  Exercise outside of work (Submitted on 9/16/2023)  Chief Complaint: Annual Exam:  Duration of exercise:: 15-30 minutes    COUNSELING:  Reviewed preventive health counseling, as reflected in patient instructions       Regular exercise       Healthy diet/nutrition       Vision screening       Hearing screening      BMI:   Estimated body mass index is 26.39 kg/m  as calculated from the following:    Height as of this encounter: 1.6 m (5' 3\").    Weight as of this encounter: 67.6 kg (149 lb).         She reports that she quit smoking about 11 years ago. " She started smoking about 41 years ago. She has a 30.00 pack-year smoking history. She has never used smokeless tobacco.      Appropriate preventive services were discussed with this patient, including applicable screening as appropriate for cardiovascular disease, diabetes, osteopenia/osteoporosis, and glaucoma.  As appropriate for age/gender, discussed screening for colorectal cancer, prostate cancer, breast cancer, and cervical cancer. Checklist reviewing preventive services available has been given to the patient.    Reviewed patients plan of care and provided an AVS. The Basic Care Plan (routine screening as documented in Health Maintenance) for Jesika meets the Care Plan requirement. This Care Plan has been established and reviewed with the Patient.          Abrahan Diez MD, MD  [facility]    Identified Health Risks:

## 2023-09-22 NOTE — COMMUNITY RESOURCES LIST (ENGLISH)
09/22/2023   [facility] - Outpatient Clinics  N/A  For additional resource needs, please contact your health insurance member services or your primary care team.  Phone: [phone]   Email: N/A   Address: [address] [place] [address]   Hours: N/A        Transportation       Free or low-cost transportation  1  [facility] - Free Bus and Gas Cards Distance: 2.97 miles      Pick   [address] [place] [address]  Language: English  Hours: Mon - Fri 9:00 AM - 4:00 PM , Sun 9:30 AM - 12:00 PM  Fees: Free   Phone: (527) 845-8625 Email: andi@[facility] Website: http://[facility]/[place]/     2  [facility] Bus Loop Distance: 4.69 miles      In-Person   [address] [place] [address]  Language: English  Hours: Mon - Fri 9:00 AM - 5:00 PM  Fees: Free   Phone: (820) 484-4563 Email: info@[employer] Website: https://www.[employer]/     Transportation to medical appointments  3  Discover Ride Distance: 6.02 miles      In-Person   [address] [address]  Language: English  Hours: Mon - Thu 6:00 AM - 6:00 PM , Fri 6:00 AM - 5:00 PM  Fees: Insurance, Self Pay   Phone: (474) 879-3391 Email: office@[facility] Website: https://www.[facility]/     4  [facility] - Non-Emergency Medical Transportation Distance: 6.99 miles      In-Person   [address] [place] [address]  Language: English  Hours: Mon - Fri 8:00 AM - 4:00 PM Appt. Only  Fees: Self Pay   Phone: (118) 372-9825 Website: http://www.[employer].org/Medical-Services/Emergency-medical-services/Non-emergency-transportation/          Important Numbers & Websites       [address].org  Poison Control   (900) 214-9324 [facility]org  Suicide and Crisis Lifeline   988 988lifeline.org  Childhelp [place] Child Abuse Hotline   969.527.9560 Childhelphotline.org  National Sexual  Assault Hotline   (185) 489-6086 (HOPE) Rainn.org  National Runaway Safeline   (263) 159-6967 (RUNAWAY) [employer].org  Pregnancy & Postpartum Support [place]   Call/text 423-436-3326 Ppsupportmn.org  Substance Abuse National Helpline ([facility]   918-023-HELP (5210) Findtreatment.gov  Emergency Services   910

## 2023-09-22 NOTE — COMMUNITY RESOURCES LIST (ENGLISH)
09/22/2023   [facility] - Outpatient Clinics  N/A  For additional resource needs, please contact your health insurance member services or your primary care team.  Phone: [phone]   Email: N/A   Address: [address] [place] [address]   Hours: N/A        Transportation       Free or low-cost transportation  1  [facility] - Free Bus and Gas Cards Distance: 2.97 miles      Pick   [address] [place] [address]  Language: English  Hours: Mon - Fri 9:00 AM - 4:00 PM , Sun 9:30 AM - 12:00 PM  Fees: Free   Phone: (307) 595-8322 Email: andi@[facility] Website: http://[facility]/[place]/     2  [facility] Bus Loop Distance: 4.69 miles      In-Person   [address] [place] [address]  Language: English  Hours: Mon - Fri 9:00 AM - 5:00 PM  Fees: Free   Phone: (495) 644-7723 Email: info@[employer] Website: https://www.[employer]/     Transportation to medical appointments  3  Discover Ride Distance: 6.02 miles      In-Person   [address] [address]  Language: English  Hours: Mon - Thu 6:00 AM - 6:00 PM , Fri 6:00 AM - 5:00 PM  Fees: Insurance, Self Pay   Phone: (451) 425-7944 Email: office@[employer] Website: https://www.[employer]/     4  [facility] - Non-Emergency Medical Transportation Distance: 6.99 miles      In-Person   [address] [place] [address]  Language: English  Hours: Mon - Fri 8:00 AM - 4:00 PM Appt. Only  Fees: Self Pay   Phone: (588) 253-8203 Website: http://www.[employer].org/Medical-Services/Emergency-medical-services/Non-emergency-transportation/          Important Numbers & Websites       [address].org  Poison Control   (533) 463-4538 [facility].org  Suicide and Crisis Lifeline   988 988lifeline.org  Childhelp [place] Child Abuse Hotline   214.531.5808 Childhelphotline.org  National Sexual  Assault Hotline   (137) 641-9450 (HOPE) Rainn.org  National Runaway Safeline   (916) 555-2038 (RUNAWAY) [employer].org  Pregnancy & Postpartum Support [place]   Call/text 712-995-8943 Ppsupportmn.org  Substance Abuse National Helpline ([facility]   353-781-HELP (2061) Findtreatment.gov  Emergency Services   910

## 2023-09-27 ENCOUNTER — OFFICE VISIT (OUTPATIENT)
Dept: AUDIOLOGY | Facility: CLINIC | Age: 78
End: 2023-09-27
Payer: COMMERCIAL

## 2023-09-27 DIAGNOSIS — H91.93 BILATERAL HEARING LOSS, UNSPECIFIED HEARING LOSS TYPE: ICD-10-CM

## 2023-09-27 DIAGNOSIS — H90.3 SENSORINEURAL HEARING LOSS, BILATERAL: Primary | ICD-10-CM

## 2023-09-27 PROCEDURE — 92550 TYMPANOMETRY & REFLEX THRESH: CPT | Mod: 52 | Performed by: AUDIOLOGIST

## 2023-09-27 PROCEDURE — 92557 COMPREHENSIVE HEARING TEST: CPT | Performed by: AUDIOLOGIST

## 2023-09-27 NOTE — PROGRESS NOTES
AUDIOLOGY REPORT    SUBJECTIVE:  Jesika Paez is a 77 year old female who was seen in the Audiology Clinic at the [facility] for audiologic evaluation, referred by Abrahan Diez M.D. The patient has been seen previously in this clinic on 3/1/18 for assessment and results indicated moderate sensorineural hearing loss in the right ear and moderate to moderately-severe sensorineural hearing loss in the left ear. Jesika was fit with a pair of Phonak Engagioeo B70-R SAW hearing aids on 5/16/18. She was fit with canal lock cShell earmolds on 7/12/18. The hearing aid warranty ended on 8/6/21. The patient reports that she has noticed some changes in hearing in both ears. 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 She denies a history of otalgia, otorrhea, tinnitus, aural fullness, dizziness, ear surgery, and family history of hearing loss. The patient notes difficulty with communication in a variety of listening situations.     OBJECTIVE:  Otoscopic exam indicates ears are clear of cerumen bilaterally.     Pure Tone Thresholds assessed using conventional audiometry with good  reliability from 250-8000 Hz bilaterally using insert earphones and circumaural headphones     RIGHT:  mild sloping to moderate-severe sensorineural hearing loss    LEFT:    mild sloping to moderate sensorineural hearing loss    Tympanogram:    RIGHT: reduced middle ear mobility (type As)    LEFT:   reduced middle ear mobility (type As)    Reflexes (reported by stimulus ear):  RIGHT: Ipsilateral is absent at frequencies tested  RIGHT: Contralateral could not be tested due to equipment limitations.  LEFT:   Ipsilateral is absent at frequencies tested  LEFT:   Contralateral could not be tested due to equipment limitations.      Speech Reception Threshold:    RIGHT: 40 dB HL    LEFT:   40 dB HL  Word Recognition Score:     RIGHT: 96% at 80 dB HL  using NU-6 recorded word list.    LEFT:   100% at 80 dB HL using NU-6 recorded word list.    Hearing Aid Check:  A listening check of Jesika's hearing aids reveals good sound quality from both devices. She is provided additional wax traps. No volume adjustments are made as Jesika is happy with the current hearing aid volume. Jesika is told that her hearing aid warranty ended on 8/6/21 so she would have to pay for repairs if needed in the future.     No charge for hearing aid check today since aids were only listened to.    ASSESSMENT:   Compared to patient's previous audiogram dated 3/1/18, hearing has declined by 10 dB at 8000 Hz in the right ear and improved by 10 dB at 250 Hz and 2000 Hz in the left ear. Today s results were discussed with the patient in detail.     PLAN:  It is recommended that the patient return for a hearing test in 1-2 years or sooner if concerns arise. Jesika should return for hearing aid checks as needed. Please call this clinic with questions regarding these results or recommendations.      Tomasz Dallas, [facility]  [place] Licensed Audiologist #[id number]

## 2023-09-29 ASSESSMENT — ENCOUNTER SYMPTOMS
MUSCLE WEAKNESS: 1
TINGLING: 0
DISTURBANCES IN COORDINATION: 1
PARALYSIS: 0
PANIC: 0
INSOMNIA: 1
WEAKNESS: 1
BACK PAIN: 1
NUMBNESS: 1
NERVOUS/ANXIOUS: 1
MEMORY LOSS: 1
SEIZURES: 0
HEADACHES: 1
ARTHRALGIAS: 1
DECREASED CONCENTRATION: 1
TREMORS: 0
NECK PAIN: 1
DIZZINESS: 1
SPEECH CHANGE: 0
STIFFNESS: 1
DEPRESSION: 0
MUSCLE CRAMPS: 1
LOSS OF CONSCIOUSNESS: 0
JOINT SWELLING: 1
EYE PAIN: 1
MYALGIAS: 1
EYE WATERING: 0
EYE REDNESS: 0
DOUBLE VISION: 0
EYE IRRITATION: 1

## 2023-10-06 ENCOUNTER — OFFICE VISIT (OUTPATIENT)
Dept: PHYSICAL MEDICINE AND REHAB | Facility: CLINIC | Age: 78
End: 2023-10-06
Attending: FAMILY MEDICINE
Payer: COMMERCIAL

## 2023-10-06 VITALS
HEART RATE: 66 BPM | SYSTOLIC BLOOD PRESSURE: 153 MMHG | HEIGHT: 63 IN | WEIGHT: 148 LBS | BODY MASS INDEX: 26.22 KG/M2 | DIASTOLIC BLOOD PRESSURE: 72 MMHG

## 2023-10-06 DIAGNOSIS — M54.12 CERVICAL RADICULOPATHY: Primary | ICD-10-CM

## 2023-10-06 DIAGNOSIS — R29.2: ICD-10-CM

## 2023-10-06 DIAGNOSIS — M79.601 PAIN OF RIGHT UPPER EXTREMITY: ICD-10-CM

## 2023-10-06 DIAGNOSIS — M54.2 CERVICALGIA: ICD-10-CM

## 2023-10-06 PROCEDURE — 99204 OFFICE O/P NEW MOD 45 MIN: CPT | Performed by: NURSE PRACTITIONER

## 2023-10-06 RX ORDER — GABAPENTIN 300 MG/1
300 CAPSULE ORAL AT BEDTIME
Qty: 30 CAPSULE | Refills: 0 | Status: SHIPPED | OUTPATIENT
Start: 2023-10-06 | End: 2023-11-03

## 2023-10-06 RX ORDER — TIZANIDINE 2 MG/1
2 TABLET ORAL 3 TIMES DAILY PRN
Qty: 30 TABLET | Refills: 0 | Status: SHIPPED | OUTPATIENT
Start: 2023-10-06 | End: 2023-11-03

## 2023-10-06 ASSESSMENT — PAIN SCALES - GENERAL: PAINLEVEL: SEVERE PAIN (7)

## 2023-10-06 NOTE — LETTER
10/6/2023         RE: Jesika Paez  [address]  [place] [address]        Dear Colleague,    Thank you for referring your patient, Jesika Paez, to the [facility]. Please see a copy of my visit note below.    ASSESSMENT: Jesika Paez is a 77 year old female presents for consultation at the request of PCP Abrahan Diez, who presents today for new patient evaluation of :     -Cervical radiculopathy    Patient has a subtle positive Maddie bilaterally on exam and has mild difficulty with tandem.  She endorses symptoms of cervical radiculopathy, including pain in both arms, and numbness into her right forearm and digits 2 through 5.  Recommend MRI cervical spine without contrast for further evaluation and treatment planning.  In the meantime, she will start gabapentin at bedtime and tizanidine 3 times a day as needed for muscle spasms.  She can continue Aleve at bedtime, last kidney function in July was normal.  I have placed a physical therapy order, however she has already been doing exercises on her own for greater than 6 weeks without improvement.  We will review her MRI imaging and decide plan.  She would be a good candidate for cervical spine epidural steroid injections for pain control if imaging does correspond with her arm symptoms and if she does not experience improvement with medications and PT.        9/29/2023     9:24 AM   OSWESTRY DISABILITY INDEX   Count 9   Sum 17   Oswestry Score (%) 37.78 %            Diagnoses and all orders for this visit:  Cervical radiculopathy  -     MR Cervical Spine w/o Contrast; Future  -     gabapentin (NEURONTIN) 300 MG capsule; Take 1 capsule (300 mg) by mouth at bedtime  -     tiZANidine (ZANAFLEX) 2 MG tablet; Take 1 tablet (2 mg) by mouth 3 times daily as needed for muscle spasms  Cervicalgia  -     Spine  Referral  Pain of right upper extremity  -     Spine  Referral  Lynn's  reflex positive  -     MR Cervical Spine w/o Contrast; Future       PLAN:  Reviewed spine anatomy and disease process. Discussed diagnosis and treatment options with the patient today. A shared decision making model was used. The patient's values and choices were respected. The following represents what was discussed and decided upon by the provider and the patient.     -DIAGNOSTIC TESTS:  Images were personally reviewed and interpreted and explained to patient today using spine model.   -- I ordered a cervical spine MRI without contrast    -PHYSICAL THERAPY:    -I ordered physical therapy referral for this patient  Discussed the importance of core strengthening, ROM, stretching exercises with the patient and how each of these entities is important in decreasing pain.  Explained to the patient that the purpose of physical therapy is to teach the patient a home exercise program.  These exercises need to be performed every day in order to decrease pain and prevent future occurrences of pain.    -MEDICATIONS:    -I ordered gabapentin 300 mg to take at nighttime  -I ordered tizanidine 2 mg to take 3 times daily as needed for muscle spasms  -She can continue Aleve at nighttime  -Okay to take Tylenol over-the-counter as well as directed for pain  Discussed multiple medication options today with patient. Discussed risks, side effects, and proper use of medications. Patient verbalized understanding.    -INTERVENTIONS:    Discussed the role of cervical epidural steroid injections injections with patient today.  We could potentially pursue this in the future, if imaging results correspond with her arm symptoms.  We would discuss risks and benefits in more detail at that time    -PATIENT EDUCATION: Total time of 40 minutes, on the day of service, spent with the patient, reviewing the chart, placing orders, and documenting.   -Today we also discussed the issues related to the pros and cons of the current treatment  plan.    -FOLLOW-UP:   Follow-up telephone call to review imaging results    Advised patient to call the Spine Center if symptoms worsen or if they develop red flag symptoms such as numbness, weakness, severe pain uncontrolled by current pain med regimen, or any new or worsening problems controlling bladder and bowel function.   ______________________________________________________________________    SUBJECTIVE:   Jesika Paez  is a 77 year old female history of osteoporosis who presents today for new patient evaluation of cervical radiculopathy    Jesika's symptoms started in June without injury.  She complains of neck pain which is more right-sided, which radiates into her right shoulder and all the way down her arm into her hand.  She also has some left-sided neck pain into her left upper outer arm only, which is not as bad as on the right.  She complains of numbness in her right arm which radiates into her second through fifth digits on her right hand.  Her symptoms improve when she is moving around during the day.  They get worse at night, and when she rests for a long time in 1 position.  She has had episodic shooting sharp pains in her left posterior scalp behind her left ear intermittently for years.  This has not gotten worse with these newer symptoms.    She states she waited for about a month before seeing anyone about these symptoms to see if they would go away on their own, but they have not.  She saw her primary care in July regarding this pain and again in September.  She states there was discussion of physical therapy, but patient ultimately decided to do exercises on her own.  In September, with persistent symptoms, she was ultimately referred for spine clinic evaluation.    Her pain today is a 7 out of 10, 10 out of 10 at worst, 5 out of 10 at best.  She has tried doing daily stretching exercises and exercises with weights without relief and the pain has gotten worse to where she needed to  take 1 Aleve 2 evenings this week.  She has not tried any other over-the-counter or topical medications.  She has tried a massage pad, which helps the neck pain but does not improve her arm symptoms.    She denies arm weakness, imbalance, changes in bowel or bladder control, leg symptoms.    She has not done any physical therapy, chiropractic care, injections, previous cervical spine surgeries    She has had lumbar surgery.    -Treatment to Date:     -Medications:  -Aleve    Current Outpatient Medications   Medication     Ascorbic Acid (VITAMIN C) 500 MG CAPS     atorvastatin (LIPITOR) 20 MG tablet     cholecalciferol, vitamin D3, 2,000 unit Tab     cyanocobalamin (VITAMIN B-12) 500 MCG tablet     gabapentin (NEURONTIN) 300 MG capsule     ibuprofen (ADVIL) 200 MG capsule     lactobacillus rhamnosus, GG, (CULTURELL) capsule     naproxen sodium (ALEVE) 220 MG capsule     Protein POWD     tiZANidine (ZANAFLEX) 2 MG tablet     UNABLE TO FIND     UNABLE TO FIND     No current facility-administered medications for this visit.       Allergies   Allergen Reactions     Codeine Sulfate [Codeine] Unknown       No past medical history on file.     Patient Active Problem List   Diagnosis     Hyperlipemia     Hearing Loss     Osteopenia     Macular degeneration (senile) of retina     Osteoporosis, unspecified osteoporosis type, unspecified pathological fracture presence       Past Surgical History:   Procedure Laterality Date     ANKLE FRACTURE SURGERY       CHOLECYSTECTOMY       FRACTURE SURGERY       HC OPEN TX BIMALLEOLAR ANKLE FX, INCL INTERNAL FIXATION      Description: Open Treatment Of Bimalleolar Ankle Fracture;  Recorded: 01/12/2010;     HC REMOVAL GALLBLADDER      Description: Cholecystectomy;  Recorded: 01/12/2010;     HC REMOVE TONSILS/ADENOIDS,<13 Y/O      Description: Tonsillectomy With Adenoidectomy;  Recorded: 01/12/2010;     HYSTERECTOMY       ZZC ARVIZU W/O FACETEC FORAMOT/DSKC 1/2 VRT SEG, LUMBAR      Description:  Laminectomy Decompressive Up To Two Lumbar Segments;  Recorded: 01/12/2010;  Comments: X2 SEPARATE BACK SURGERIES     Z LIGATE FALLOPIAN TUBE      Description: Tubal Ligation;  Recorded: 01/12/2010;     RUST TOTAL ABDOM HYSTERECTOMY      Description: Total Abdominal Hysterectomy;  Recorded: 01/12/2010;     ZZC TOTAL ABDOM HYSTERECTOMY      Description: Hysterectomy;  Recorded: 01/12/2010;       Family History   Problem Relation Age of Onset     Dementia Mother      Hearing Loss Mother      Heart Disease Father      Hyperlipidemia Father      Hypertension Father      Seizure Disorder Sister      Pneumonia Brother      Arthritis Brother      Chronic Infections Brother      Suicidality Son        Reviewed past medical, surgical, and family history with patient found on new patient intake packet located in EMR Media tab.     SOCIAL HX: Non-smoker, no alcohol use, previous heavy drinking, positive recreational drug use    Oswestry (JULIANA) Questionnaire:        9/29/2023     9:24 AM   OSWESTRY DISABILITY INDEX   Count 9   Sum 17   Oswestry Score (%) 37.78 %       Neck Disability Index:      9/29/2023     9:39 AM   Neck Disability Index (  Reynold H. and Carlos Enrique CANDELARIA. 1991. All rights reserved.; used with permission)   SECTION 1 - PAIN INTENSITY 2   SECTION 2 - PERSONAL CARE 0   SECTION 3 - LIFTING 0   SECTION 4 - READING 2   SECTION 5 - HEADACHES 2   SECTION 6 - CONCENTRATION 3   SECTION 7 - WORK 3   SECTION 9 - SLEEPING 4   SECTION 10 - RECREATION 2   Count 9   Sum 18   Raw Score: /50 20   Neck Disability Index Score: (%) 40 %          PHQ-2 Score:       2/10/2023     8:08 AM 2/10/2023     8:07 AM   PHQ-2 ( 1999 [employer])   Q1: Little interest or pleasure in doing things 0 0   Q2: Feeling down, depressed or hopeless 0 0   PHQ-2 Score 0 0   Q1: Little interest or pleasure in doing things Not at all    Q2: Feeling down, depressed or hopeless Not at all    PHQ-2 Score 0           ROS: Per below    Answers submitted by the patient  "for this visit:  Symptoms you have experienced in the last 30 days (Submitted on 9/29/2023)  General Symptoms: No  Skin Symptoms: No  HENT Symptoms: No  EYE SYMPTOMS: Yes  HEART SYMPTOMS: No  LUNG SYMPTOMS: No  INTESTINAL SYMPTOMS: No  URINARY SYMPTOMS: No  GYNECOLOGIC SYMPTOMS: No  BREAST SYMPTOMS: No  SKELETAL SYMPTOMS: Yes  BLOOD SYMPTOMS: No  NERVOUS SYSTEM SYMPTOMS: Yes  MENTAL HEALTH SYMPTOMS: Yes  Please answer the questions below to tell us what conditions you are experiencing: (Submitted on 9/29/2023)  Eye pain: Yes  Vision loss: No  Dry eyes: Yes  Watery eyes: No  Eye bulging: No  Double vision: No  Flashing of lights: No  Spots: No  Floaters: No  Redness: No  Crossed eyes: No  Tunnel Vision: No  Yellowing of eyes: No  Eye irritation: Yes  Please answer the questions below to tell us what condition you are experiencing: (Submitted on 9/29/2023)  Back pain: Yes  Muscle aches: Yes  Neck pain: Yes  Swollen joints: Yes  Joint pain: Yes  Bone pain: Yes  Muscle cramps: Yes  Muscle weakness: Yes  Joint stiffness: Yes  Bone fracture: No  Please answer the questions below to tell us what condition you are experiencing: (Submitted on 9/29/2023)  Trouble with coordination: Yes  Dizziness or trouble with balance: Yes  Fainting or black-out spells: No  Memory loss: Yes  Headache: Yes  Seizures: No  Speech problems: No  Tingling: No  Tremor: No  Weakness: Yes  Difficulty walking: No  Paralysis: No  Numbness: Yes  Please answer the questions below to tell us what condition you are experiencing: (Submitted on 9/29/2023)  Nervous or Anxious: Yes  Depression: No  Trouble sleeping: Yes  Trouble thinking or concentrating: Yes  Mood changes: Yes  Panic attacks: No      OBJECTIVE:  BP (!) 153/72   Pulse 66   Ht 5' 3\" (1.6 m)   Wt 148 lb (67.1 kg)   BMI 26.22 kg/m      PHYSICAL EXAMINATION:  --CONSTITUTIONAL: Vital signs as above. No acute distress. The patient is well nourished and well groomed.  --PSYCHIATRIC: The patient " is awake, alert, oriented to person, place, and time, and answering questions appropriately with clear speech. Appropriate mood and affect   --HEENT: Sclera are non-injected. Extraocular muscles are intact. Moist oral mucosa.  --SKIN: Skin over the face, bilateral upper extremities, and posterior torso is clean, dry, intact without rashes.    --RESPIRATORY: Normal rhythm and effort. No abnormal accessory muscle breathing patterns noted.     --GROSS MOTOR: Easily arises from a seated position.  Mild difficulty with tandem gait     --UPPER EXTREMITY MOTOR TESTING:  Shoulder abduction: right 5/5, left 5/5  Triceps: right 5/5 left 5/5  Biceps:right 5/5  left 5/5,   Hand :  right 5/5  left 5/5,   Intrinsics: right 5/5  left 5/5,   Extensors: right 5/5  left 5/5,     --LOWER EXTREMITY MOTOR TESTING  Hip flexion: right 5/5  left 5/5,   Quads:right 5/5  left 5/5,   Hamstrings: right 5/5  left 5/5,   Dorsiflexion: right 5/5  left 5/5,   Plantarflexion: right 5/5  left 5/5,   EHL: right 5/5  left 5/5,     REFLEXES: 2/4 symmetric triceps, biceps, brachioradialis bilaterally. 2/4 symmetric patellar, achilles reflex bilaterally.  Negative Clonus,   Equivocal Babinski  Positive Lynn's bilaterally.      SENSATION: of the upper and lower extremities is intact to light touch.   --VASCULAR: Warm upper and lower limbs bilaterally.     --CERVICAL SPINE: Inspection reveals no evidence of deformity or swelling. Range of motion is not limited in cervical flexion, extension, lateral rotation, head tilt. No point tenderness to palpation of cervical spine.  Bilateral trap tenderness to palpation.  No tenderness of scaps or paraspinal musculature.  Some pronounced midline lower cervical soft tissue, nontender, no redness    --SHOULDERS No tenderness to palpation or swelling noted of AC joint.    --WRISTS: Full range of motion bilaterally,    RESULTS:   Prior medical records from [facility] and [facility]where were reviewed  today.         IMAGING:       No results found.       Letha RAMIREZP-C  [facility] [place] [facility]  O. [phone]      Again, thank you for allowing me to participate in the care of your patient.        Sincerely,        HILL Mendez CNP

## 2023-10-06 NOTE — PATIENT INSTRUCTIONS
tizanidine (muscle relaxant medication) has been prescribed today. Please take this three times daily as needed for muscle spasms. This medication may cause drowsiness. Please do not work or drive while taking this medication until you know how it effects you. If it does make you drowsy, you should only take it before bedtime or at times that you do not have to work/drive.     Prescribed Gabapentin today, 300 mg tablets to take once per night at bedtime for your nerve pain.       ~You have been referred for Physical Therapy to [facility]. They will call you to schedule an appointment.      Scheduling phone number is [phone] for [facility], or [place] location.  If you have not heard from the scheduling office within 2 business days, please call [phone] for ALL other locations.    Discussed the importance of core strengthening, ROM, stretching exercises and how each of these entities is important in decreasing pain and improving long term spine health.  The purpose of physical therapy is to teach you an individualized home exercise program.  These exercises need to be performed every day in order to decrease pain and prevent future occurrences of pain.             Imaging (Xray, CT, or MRI) has been ordered today.   Radiology will call you to schedule. Please call below if you do not hear from them in the next couple of days.     [facility] Radiology Scheduling:  Please call [phone] to schedule your image(s) (select option #1).    There are 3 different locations:    [facility]  [address] [place] [address]    [facility] [place]  [address]   [place] [address]    [address] [address]       Radicular Pain    Radicular pain in either the arm or leg is usually from a bulging disc in the spine. A portion of the herniated disc may press  "against the nerves as the nerves exit the spine. This causes pain which is felt down the arm or leg. Other causes of radicular pain may include:  Fractures.  Heart disease.  Cancer.  An abnormal and usually degenerative state of the nervous system or nerves (neuropathy).    In most cases, radicular pain is treated without imaging unless symptoms do not start to improve. If that is the case, your provider may order a CT or MRI scan to determine the cause.     Nerves in the cervical spine (neck) may cause radicular pain into the outer shoulder and down the arm. It can spread down to the thumb and fingers. The symptoms vary depending on which nerve root has been affected. In most cases, radicular pain improves with conservative treatment such as physical therapy, cervical traction, medications, and epidural steroid injections. A program for neck rehabilitation with stretching and strengthening exercises is an important part of management. Treatment may take months, and surgery may be considered as a last resort if the symptoms do not improve.    Nerves in the lumbar spine (lower back) may cause radicular pain into the hip and down the leg. The commonly used term for this type of pain is \"sciatica\". Conservative treatment is also recommended for this problem. Most patients feel better after 2 to 4 weeks of rest and other supportive care. You should avoid bending, lifting, and all other activities which can make your pain worse. Physical therapy, traction, medications, and epidural steroid injections can be good options to help with your recovery. A program for back injury rehabilitation with stretching and strengthening exercises is an important part of management. Surgery may be considered as a last resort if symptoms do not improve with conservative treatment.     You may take over-the-counter or prescription medicines for pain, discomfort, or fever as directed by your caregiver. Muscle relaxants may help by relieving " more severe pain and spasm. Neuropathic medication (such as gabapentin, lyrica, or cymbalta) can help decrease your symptoms of nerve pain as well. Cold or massage can also give significant relief. Spinal manipulation is not recommended as it can increase the degree of disc protrusion. We do not recommend taking narcotic medication such as percocet, oxycodone, norco, dilaudid, or others unless pain is severe and not controlled with any other oral options.    Epidural steroid injections are often effective treatment for radicular pain. These injections deliver strong anti-inflammatory medicine to the area directly around the nerve root in the space between your back bones (vertebrae). Your provider can give you more information about steroid injections. These injections are most effective when given within two weeks of the onset of acute pain. You should see your provider for follow up care as recommended.     In most cases, radicular pain is treated without imaging unless symptoms do not start to improve. If that is the case, your provider may order a CT or MRI scan to determine the cause.     SEEK IMMEDIATE MEDICAL CARE IF:  You develop increased pain, weakness, or numbness in your arm or leg.  You develop difficulty with bladder or bowel control.  You develop abdominal pain.    Document Released: 01/25/2006 Document Revised: 03/11/2013 Document Reviewed: 04/12/2010  Patient Information  2013 [employer].       ~Please call our [facility] Nurse Navigation line [phone] with any questions or concerns about your treatment plan, if symptoms worsen and you would like to be seen urgently, or if you have any new or worsening numbness, weakness, or problems controlling bladder and bowel function.  ~You are also welcome to contact Letha Wei via [email or web address], but please be aware that responses to [email or web address] message may take 2-3 days due to the high volume of patients seen in clinic.

## 2023-10-06 NOTE — PROGRESS NOTES
ASSESSMENT: Jesika Paez is a 77 year old female presents for consultation at the request of PCP Abrahan Diez, who presents today for new patient evaluation of :     -Cervical radiculopathy    Patient has a subtle positive Maddie bilaterally on exam and has mild difficulty with tandem.  She endorses symptoms of cervical radiculopathy, including pain in both arms, and numbness into her right forearm and digits 2 through 5.  Recommend MRI cervical spine without contrast for further evaluation and treatment planning.  In the meantime, she will start gabapentin at bedtime and tizanidine 3 times a day as needed for muscle spasms.  She can continue Aleve at bedtime, last kidney function in July was normal.  I have placed a physical therapy order, however she has already been doing exercises on her own for greater than 6 weeks without improvement.  We will review her MRI imaging and decide plan.  She would be a good candidate for cervical spine epidural steroid injections for pain control if imaging does correspond with her arm symptoms and if she does not experience improvement with medications and PT.        9/29/2023     9:24 AM   OSWESTRY DISABILITY INDEX   Count 9   Sum 17   Oswestry Score (%) 37.78 %            Diagnoses and all orders for this visit:  Cervical radiculopathy  -     MR Cervical Spine w/o Contrast; Future  -     gabapentin (NEURONTIN) 300 MG capsule; Take 1 capsule (300 mg) by mouth at bedtime  -     tiZANidine (ZANAFLEX) 2 MG tablet; Take 1 tablet (2 mg) by mouth 3 times daily as needed for muscle spasms  Cervicalgia  -     Spine  Referral  Pain of right upper extremity  -     Spine  Referral  Lynn's reflex positive  -     MR Cervical Spine w/o Contrast; Future       PLAN:  Reviewed spine anatomy and disease process. Discussed diagnosis and treatment options with the patient today. A shared decision making model was used. The patient's values and choices were  respected. The following represents what was discussed and decided upon by the provider and the patient.     -DIAGNOSTIC TESTS:  Images were personally reviewed and interpreted and explained to patient today using spine model.   -- I ordered a cervical spine MRI without contrast    -PHYSICAL THERAPY:    -I ordered physical therapy referral for this patient  Discussed the importance of core strengthening, ROM, stretching exercises with the patient and how each of these entities is important in decreasing pain.  Explained to the patient that the purpose of physical therapy is to teach the patient a home exercise program.  These exercises need to be performed every day in order to decrease pain and prevent future occurrences of pain.    -MEDICATIONS:    -I ordered gabapentin 300 mg to take at nighttime  -I ordered tizanidine 2 mg to take 3 times daily as needed for muscle spasms  -She can continue Aleve at nighttime  -Okay to take Tylenol over-the-counter as well as directed for pain  Discussed multiple medication options today with patient. Discussed risks, side effects, and proper use of medications. Patient verbalized understanding.    -INTERVENTIONS:    Discussed the role of cervical epidural steroid injections injections with patient today.  We could potentially pursue this in the future, if imaging results correspond with her arm symptoms.  We would discuss risks and benefits in more detail at that time    -PATIENT EDUCATION: Total time of 40 minutes, on the day of service, spent with the patient, reviewing the chart, placing orders, and documenting.   -Today we also discussed the issues related to the pros and cons of the current treatment plan.    -FOLLOW-UP:   Follow-up telephone call to review imaging results    Advised patient to call the [facility] if symptoms worsen or if they develop red flag symptoms such as numbness, weakness, severe pain uncontrolled by current pain med regimen, or any new or  worsening problems controlling bladder and bowel function.   ______________________________________________________________________    SUBJECTIVE:   Jesika Paez  is a 77 year old female history of osteoporosis who presents today for new patient evaluation of cervical radiculopathy    Jesika's symptoms started in June without injury.  She complains of neck pain which is more right-sided, which radiates into her right shoulder and all the way down her arm into her hand.  She also has some left-sided neck pain into her left upper outer arm only, which is not as bad as on the right.  She complains of numbness in her right arm which radiates into her second through fifth digits on her right hand.  Her symptoms improve when she is moving around during the day.  They get worse at night, and when she rests for a long time in 1 position.  She has had episodic shooting sharp pains in her left posterior scalp behind her left ear intermittently for years.  This has not gotten worse with these newer symptoms.    She states she waited for about a month before seeing anyone about these symptoms to see if they would go away on their own, but they have not.  She saw her primary care in July regarding this pain and again in September.  She states there was discussion of physical therapy, but patient ultimately decided to do exercises on her own.  In September, with persistent symptoms, she was ultimately referred for spine clinic evaluation.    Her pain today is a 7 out of 10, 10 out of 10 at worst, 5 out of 10 at best.  She has tried doing daily stretching exercises and exercises with weights without relief and the pain has gotten worse to where she needed to take 1 Aleve 2 evenings this week.  She has not tried any other over-the-counter or topical medications.  She has tried a massage pad, which helps the neck pain but does not improve her arm symptoms.    She denies arm weakness, imbalance, changes in bowel or bladder  control, leg symptoms.    She has not done any physical therapy, chiropractic care, injections, previous cervical spine surgeries    She has had lumbar surgery.    -Treatment to Date:     -Medications:  -Aleve    Current Outpatient Medications   Medication    Ascorbic Acid (VITAMIN C) 500 MG CAPS    atorvastatin (LIPITOR) 20 MG tablet    cholecalciferol, vitamin D3, 2,000 unit Tab    cyanocobalamin (VITAMIN B-12) 500 MCG tablet    gabapentin (NEURONTIN) 300 MG capsule    ibuprofen (ADVIL) 200 MG capsule    lactobacillus rhamnosus, GG, (CULTURELL) capsule    naproxen sodium (ALEVE) 220 MG capsule    Protein POWD    tiZANidine (ZANAFLEX) 2 MG tablet    UNABLE TO FIND    UNABLE TO FIND     No current facility-administered medications for this visit.       Allergies   Allergen Reactions    Codeine Sulfate [Codeine] Unknown       No past medical history on file.     Patient Active Problem List   Diagnosis    Hyperlipemia    Hearing Loss    Osteopenia    Macular degeneration (senile) of retina    Osteoporosis, unspecified osteoporosis type, unspecified pathological fracture presence       Past Surgical History:   Procedure Laterality Date    ANKLE FRACTURE SURGERY      CHOLECYSTECTOMY      FRACTURE SURGERY      [facility] OPEN TX BIMALLEOLAR ANKLE FX, INCL INTERNAL FIXATION      Description: Open Treatment Of Bimalleolar Ankle Fracture;  Recorded: 01/12/2010;    [facility] REMOVAL GALLBLADDER      Description: Cholecystectomy;  Recorded: 01/12/2010;    [facility] REMOVE TONSILS/ADENOIDS,<13 Y/O      Description: Tonsillectomy With Adenoidectomy;  Recorded: 01/12/2010;    HYSTERECTOMY      [facility] ARVIZU W/O FACETEC FORAMOT/[facility]KC 1/2 VRT SEG, LUMBAR      Description: Laminectomy Decompressive Up To Two Lumbar Segments;  Recorded: 01/12/2010;  Comments: X2 SEPARATE BACK SURGERIES    [facility] LIGATE FALLOPIAN TUBE      Description: Tubal Ligation;  Recorded: 01/12/2010;    [facility] TOTAL ABDOM HYSTERECTOMY      Description: Total Abdominal Hysterectomy;  Recorded:  01/12/2010;    [facility] TOTAL ABDOM HYSTERECTOMY      Description: Hysterectomy;  Recorded: 01/12/2010;       Family History   Problem Relation Age of Onset    Dementia Mother     Hearing Loss Mother     Heart Disease Father     Hyperlipidemia Father     Hypertension Father     Seizure Disorder Sister     Pneumonia Brother     Arthritis Brother     Chronic Infections Brother     Suicidality Son        Reviewed past medical, surgical, and family history with patient found on new patient intake packet located in EMR Media tab.     SOCIAL HX: Non-smoker, no alcohol use, previous heavy drinking, positive recreational drug use    Oswestry (JULIANA) Questionnaire:        9/29/2023     9:24 AM   OSWESTRY DISABILITY INDEX   Count 9   Sum 17   Oswestry Score (%) 37.78 %       Neck Disability Index:      9/29/2023     9:39 AM   Neck Disability Index (  Reynold H. and Carlos Enrique CANDELARIA. 1991. All rights reserved.; used with permission)   SECTION 1 - PAIN INTENSITY 2   SECTION 2 - PERSONAL CARE 0   SECTION 3 - LIFTING 0   SECTION 4 - READING 2   SECTION 5 - HEADACHES 2   SECTION 6 - CONCENTRATION 3   SECTION 7 - WORK 3   SECTION 9 - SLEEPING 4   SECTION 10 - RECREATION 2   Count 9   Sum 18   Raw Score: /50 20   Neck Disability Index Score: (%) 40 %          PHQ-2 Score:       2/10/2023     8:08 AM 2/10/2023     8:07 AM   PHQ-2 ( 1999 [employer])   Q1: Little interest or pleasure in doing things 0 0   Q2: Feeling down, depressed or hopeless 0 0   PHQ-2 Score 0 0   Q1: Little interest or pleasure in doing things Not at all    Q2: Feeling down, depressed or hopeless Not at all    PHQ-2 Score 0           ROS: Per below    Answers submitted by the patient for this visit:  Symptoms you have experienced in the last 30 days (Submitted on 9/29/2023)  General Symptoms: No  Skin Symptoms: No  HENT Symptoms: No  EYE SYMPTOMS: Yes  HEART SYMPTOMS: No  LUNG SYMPTOMS: No  INTESTINAL SYMPTOMS: No  URINARY SYMPTOMS: No  GYNECOLOGIC SYMPTOMS: No  BREAST SYMPTOMS:  "No  SKELETAL SYMPTOMS: Yes  BLOOD SYMPTOMS: No  NERVOUS SYSTEM SYMPTOMS: Yes  MENTAL HEALTH SYMPTOMS: Yes  Please answer the questions below to tell us what conditions you are experiencing: (Submitted on 9/29/2023)  Eye pain: Yes  Vision loss: No  Dry eyes: Yes  Watery eyes: No  Eye bulging: No  Double vision: No  Flashing of lights: No  Spots: No  Floaters: No  Redness: No  Crossed eyes: No  Tunnel Vision: No  Yellowing of eyes: No  Eye irritation: Yes  Please answer the questions below to tell us what condition you are experiencing: (Submitted on 9/29/2023)  Back pain: Yes  Muscle aches: Yes  Neck pain: Yes  Swollen joints: Yes  Joint pain: Yes  Bone pain: Yes  Muscle cramps: Yes  Muscle weakness: Yes  Joint stiffness: Yes  Bone fracture: No  Please answer the questions below to tell us what condition you are experiencing: (Submitted on 9/29/2023)  Trouble with coordination: Yes  Dizziness or trouble with balance: Yes  Fainting or black-out spells: No  Memory loss: Yes  Headache: Yes  Seizures: No  Speech problems: No  Tingling: No  Tremor: No  Weakness: Yes  Difficulty walking: No  Paralysis: No  Numbness: Yes  Please answer the questions below to tell us what condition you are experiencing: (Submitted on 9/29/2023)  Nervous or Anxious: Yes  Depression: No  Trouble sleeping: Yes  Trouble thinking or concentrating: Yes  Mood changes: Yes  Panic attacks: No      OBJECTIVE:  BP (!) 153/72   Pulse 66   Ht 5' 3\" (1.6 m)   Wt 148 lb (67.1 kg)   BMI 26.22 kg/m      PHYSICAL EXAMINATION:  --CONSTITUTIONAL: Vital signs as above. No acute distress. The patient is well nourished and well groomed.  --PSYCHIATRIC: The patient is awake, alert, oriented to person, place, and time, and answering questions appropriately with clear speech. Appropriate mood and affect   --HEENT: Sclera are non-injected. Extraocular muscles are intact. Moist oral mucosa.  --SKIN: Skin over the face, bilateral upper extremities, and posterior " torso is clean, dry, intact without rashes.    --RESPIRATORY: Normal rhythm and effort. No abnormal accessory muscle breathing patterns noted.     --GROSS MOTOR: Easily arises from a seated position.  Mild difficulty with tandem gait     --UPPER EXTREMITY MOTOR TESTING:  Shoulder abduction: right 5/5, left 5/5  Triceps: right 5/5 left 5/5  Biceps:right 5/5  left 5/5,   Hand :  right 5/5  left 5/5,   Intrinsics: right 5/5  left 5/5,   Extensors: right 5/5  left 5/5,     --LOWER EXTREMITY MOTOR TESTING  Hip flexion: right 5/5  left 5/5,   Quads:right 5/5  left 5/5,   Hamstrings: right 5/5  left 5/5,   Dorsiflexion: right 5/5  left 5/5,   Plantarflexion: right 5/5  left 5/5,   EHL: right 5/5  left 5/5,     REFLEXES: 2/4 symmetric triceps, biceps, brachioradialis bilaterally. 2/4 symmetric patellar, achilles reflex bilaterally.  Negative Clonus,   Equivocal Babinski  Positive Lynn's bilaterally.      SENSATION: of the upper and lower extremities is intact to light touch.   --VASCULAR: Warm upper and lower limbs bilaterally.     --CERVICAL SPINE: Inspection reveals no evidence of deformity or swelling. Range of motion is not limited in cervical flexion, extension, lateral rotation, head tilt. No point tenderness to palpation of cervical spine.  Bilateral trap tenderness to palpation.  No tenderness of scaps or paraspinal musculature.  Some pronounced midline lower cervical soft tissue, nontender, no redness    --SHOULDERS No tenderness to palpation or swelling noted of AC joint.    --WRISTS: Full range of motion bilaterally,    RESULTS:   Prior medical records from [facility] were reviewed today.         IMAGING:       No results found.       Letha Wei FNP-C  [facility]  O. [phone]

## 2023-10-07 ENCOUNTER — HOSPITAL ENCOUNTER (OUTPATIENT)
Dept: MRI IMAGING | Facility: HOSPITAL | Age: 78
Discharge: HOME | End: 2023-10-07
Attending: NURSE PRACTITIONER | Admitting: NURSE PRACTITIONER
Payer: COMMERCIAL

## 2023-10-07 DIAGNOSIS — M54.12 CERVICAL RADICULOPATHY: ICD-10-CM

## 2023-10-07 DIAGNOSIS — R29.2: ICD-10-CM

## 2023-10-07 PROCEDURE — 72141 MRI NECK SPINE W/O DYE: CPT

## 2023-10-09 DIAGNOSIS — E78.5 HYPERLIPIDEMIA: ICD-10-CM

## 2023-10-10 ENCOUNTER — TELEPHONE (OUTPATIENT)
Dept: PHYSICAL MEDICINE AND REHAB | Facility: CLINIC | Age: 78
End: 2023-10-10
Payer: COMMERCIAL

## 2023-10-10 DIAGNOSIS — M54.12 CERVICAL RADICULOPATHY: Primary | ICD-10-CM

## 2023-10-10 DIAGNOSIS — E78.5 HYPERLIPIDEMIA: ICD-10-CM

## 2023-10-10 RX ORDER — ATORVASTATIN CALCIUM 20 MG/1
20 TABLET, FILM COATED ORAL DAILY
Qty: 90 TABLET | Refills: 3 | Status: SHIPPED | OUTPATIENT
Start: 2023-10-10

## 2023-10-10 RX ORDER — ATORVASTATIN CALCIUM 20 MG/1
TABLET, FILM COATED ORAL
Qty: 90 TABLET | Refills: 0 | Status: SHIPPED | OUTPATIENT
Start: 2023-10-10 | End: 2023-10-10

## 2023-10-10 NOTE — TELEPHONE ENCOUNTER
"Call placed to patient with provider's results and recommendations.  Pt stated understanding. Pt reports her pain is \"dulled somewhat now\". Pt states the first couple of days she took 1 capsule Gabapentin at bedtime she felt \"discombobulated and out of it\". Pt states that is now better today. If patients side effects continue she plans to stop the medication. She does not plan on increasing her dose.     Pt is going to think about the injection and will call back if she would like to move forward.   She is scheduled to begin PT on 10/20.       "

## 2023-10-10 NOTE — TELEPHONE ENCOUNTER
Mychart refill request received.    Medication pended for renewal.    To pcp for review/refill.  Thank you

## 2023-10-10 NOTE — TELEPHONE ENCOUNTER
"Pt called back to discuss further. Pt wanting to speak further with Letha. Offered her appt to review imaging which pt declined. Pt wanting to know if Letha would be willing to place surgical referral at this time.     She states she went through PT, medication and injection for her low back and ended up having surgery. The second time this happened with her low back she went right to surgery. She would like to get surgical opinion at this point \"instead of doing all of the medications and injections and physical therapy\".     Informed pt that Letha would be updated with her request/question and she will be contacted with [facility].   "

## 2023-10-10 NOTE — TELEPHONE ENCOUNTER
----- Message from HILL Quach CNP sent at 10/10/2023  8:20 AM CDT -----  Please let Jesika know that her cervical MRI shows a disc herniation at C5-6 which is causing significant narrowing around the nerves on both sides at this level. There is some narrowing around her spinal cord as well. If her neck and arm pain is still significant, we can increase gabapentin 300mg slowly to TID and we could offer a C7-T1 IL LAURENCE for pain control.

## 2023-10-11 NOTE — TELEPHONE ENCOUNTER
Sure- I will enter a neurosurgery referral and cervical flexion extension Xrays. I would be happy to see her back if they feel she should maximize conservative treatment first.

## 2023-10-11 NOTE — TELEPHONE ENCOUNTER
Called and discussed with patient. She is aware that referral and x-ray orders are in place and she will be contacted to schedule.

## 2023-10-16 ENCOUNTER — TELEPHONE (OUTPATIENT)
Dept: NEUROSURGERY | Facility: CLINIC | Age: 78
End: 2023-10-16
Payer: COMMERCIAL

## 2023-10-17 ENCOUNTER — OFFICE VISIT (OUTPATIENT)
Dept: NEUROSURGERY | Facility: CLINIC | Age: 78
End: 2023-10-17
Attending: NURSE PRACTITIONER
Payer: COMMERCIAL

## 2023-10-17 ENCOUNTER — HOSPITAL ENCOUNTER (OUTPATIENT)
Dept: RADIOLOGY | Facility: HOSPITAL | Age: 78
Discharge: HOME | End: 2023-10-17
Attending: NURSE PRACTITIONER | Admitting: NURSE PRACTITIONER
Payer: COMMERCIAL

## 2023-10-17 VITALS — DIASTOLIC BLOOD PRESSURE: 68 MMHG | HEART RATE: 57 BPM | OXYGEN SATURATION: 95 % | SYSTOLIC BLOOD PRESSURE: 146 MMHG

## 2023-10-17 DIAGNOSIS — M54.12 CERVICAL RADICULOPATHY: Primary | ICD-10-CM

## 2023-10-17 DIAGNOSIS — M54.12 CERVICAL RADICULOPATHY: ICD-10-CM

## 2023-10-17 PROCEDURE — 72040 X-RAY EXAM NECK SPINE 2-3 VW: CPT

## 2023-10-17 PROCEDURE — 99214 OFFICE O/P EST MOD 30 MIN: CPT | Performed by: SURGERY

## 2023-10-17 ASSESSMENT — PAIN SCALES - GENERAL: PAINLEVEL: EXTREME PAIN (8)

## 2023-10-17 NOTE — LETTER
10/17/2023         RE: Jesika Paez  [address]  [place] [address]        Dear Colleague,    Thank you for referring your patient, Jesika Paez, to the [facility]. Please see a copy of my visit note below.    NEUROSURGERY CONSULTATION NOTE    Neurosurgery was asked to see this patient by Letha Wei* for evaluation of cervical radiculopathy.     CONSULTATION ASSESSMENT AND PLAN:    78 yo female who present with neck and arm pain.  X-rays show straightening of her normal lordosis.  Retrolisthesis in extension at cervical 3-4 as well as cervical 4-5.  No other spondylolisthesis.  I of her cervical spine shows moderate spinal canal stenosis at cervical 5-6 with severe foraminal narrowing.  There is mild spinal canal stenosis at cervical 3-4 with mild bilateral foraminal narrowing.  Also she has mild left foraminal narrowing at cervical 6-7.  She is to begin physical therapy this Friday and recommend she follow-up with this and begin physical therapy.  If no relief she could also consider a cervical 7-thoracic 1 interlaminar epidural injection.  If no relief from conservative management she will return to clinic to discuss possible cervical 5-6 anterior cervical decompression and fusion.    Karishma Fernandez MD      HPI:  78 yo female who present with neck and arm pain. Arm pain began in July and is located in her shoulders and into arms. Started in more proximal arms into her elbows bilaterally. On right side traveled done her arm into her hand. Denies numbness and tingling other then her toes. When symptoms are severe she feels she is weak in her right arm. Denies bowel or bladder dysfunction. She complains of a little imbalance. Has fallen in the past.     Begins therapy this Friday. Has not undergone any physical therapy to date for this problem.   Gabapentin, tizanidine, aleve and tylenol- this can help her symptoms be less severe.  Aleve helps with headaches.   No prior spine injections.     No past medical history on file.    Past Surgical History:   Procedure Laterality Date     ANKLE FRACTURE SURGERY       CHOLECYSTECTOMY       FRACTURE SURGERY       [facility] OPEN TX BIMALLEOLAR ANKLE FX, INCL INTERNAL FIXATION      Description: Open Treatment Of Bimalleolar Ankle Fracture;  Recorded: 01/12/2010;     [facility] REMOVAL GALLBLADDER      Description: Cholecystectomy;  Recorded: 01/12/2010;     [facility] REMOVE TONSILS/ADENOIDS,<13 Y/O      Description: Tonsillectomy With Adenoidectomy;  Recorded: 01/12/2010;     HYSTERECTOMY       [facility] ARVIZU W/O FACETEC FORAMOT/DSKC 1/2 VRT SEG, LUMBAR      Description: Laminectomy Decompressive Up To Two Lumbar Segments;  Recorded: 01/12/2010;  Comments: X2 SEPARATE BACK SURGERIES     [facility] LIGATE FALLOPIAN TUBE      Description: Tubal Ligation;  Recorded: 01/12/2010;     [facility] TOTAL ABDOM HYSTERECTOMY      Description: Total Abdominal Hysterectomy;  Recorded: 01/12/2010;     [facility] TOTAL ABDOM HYSTERECTOMY      Description: Hysterectomy;  Recorded: 01/12/2010;       REVIEW OF SYSTEMS:  See ROS form under media     MEDICATIONS:    Current Outpatient Medications   Medication Sig Dispense Refill     Ascorbic Acid (VITAMIN C) 500 MG CAPS        atorvastatin (LIPITOR) 20 MG tablet Take 1 tablet (20 mg) by mouth daily 90 tablet 3     cholecalciferol, vitamin D3, 2,000 unit Tab [CHOLECALCIFEROL, VITAMIN D3, 2,000 UNIT TAB] 2 tabs a day 100 each 3     cyanocobalamin (VITAMIN B-12) 500 MCG tablet Take 500 mcg by mouth once a week       gabapentin (NEURONTIN) 300 MG capsule Take 1 capsule (300 mg) by mouth at bedtime 30 capsule 0     tiZANidine (ZANAFLEX) 2 MG tablet Take 1 tablet (2 mg) by mouth 3 times daily as needed for muscle spasms 30 tablet 0     lactobacillus rhamnosus, GG, (CULTURELL) capsule Take 1 capsule by mouth 2 times daily K Sharmila       naproxen sodium (ALEVE) 220 MG capsule Take 220 mg by mouth 2 times daily (with meals)        Protein POWD Uses a whey blend       UNABLE TO FIND MEDICATION NAME: Primrose supplement       UNABLE TO FIND MEDICATION NAME: Horsetail           ALLERGIES/SENSITIVITIES:     Allergies   Allergen Reactions     Codeine Sulfate [Codeine] Unknown       PERTINENT SOCIAL HISTORY:   Social History     Socioeconomic History     Marital status:      Spouse name: None     Number of children: None     Years of education: None     Highest education level: None   Tobacco Use     Smoking status: Former     Packs/day: 1.00     Years: 30.00     Additional pack years: 0.00     Total pack years: 30.00     Types: Cigarettes     Start date: 1982     Quit date: 2012     Years since quittin.7     Smokeless tobacco: Never   Substance and Sexual Activity     Alcohol use: No     Drug use: No   Social History Narrative    Patient has been  4 times.  She lives with her son.  Her younger son committed suicide at the age of 24.     Social Determinants of Health     Financial Resource Strain: Low Risk  (2023)    Financial Resource Strain      Within the past 12 months, have you or your family members you live with been unable to get utilities (heat, electricity) when it was really needed?: No   Food Insecurity: Low Risk  (2023)    Food Insecurity      Within the past 12 months, did you worry that your food would run out before you got money to buy more?: No      Within the past 12 months, did the food you bought just not last and you didn t have money to get more?: No   Transportation Needs: High Risk (2023)    Transportation Needs      Within the past 12 months, has lack of transportation kept you from medical appointments, getting your medicines, non-medical meetings or appointments, work, or from getting things that you need?: Yes   Interpersonal Safety: Low Risk  (2023)    Interpersonal Safety      Do you feel physically and emotionally safe where you currently live?: Yes      Within the  past 12 months, have you been hit, slapped, kicked or otherwise physically hurt by someone?: No      Within the past 12 months, have you been humiliated or emotionally abused in other ways by your partner or ex-partner?: No   Housing Stability: Low Risk  (9/21/2023)    Housing Stability      Do you have housing? : Yes      Are you worried about losing your housing?: No         FAMILY HISTORY:  Family History   Problem Relation Age of Onset     Dementia Mother      Hearing Loss Mother      Heart Disease Father      Hyperlipidemia Father      Hypertension Father      Seizure Disorder Sister      Pneumonia Brother      Arthritis Brother      Chronic Infections Brother      Suicidality Son         PHYSICAL EXAM:   Constitutional: BP (!) 146/68   Pulse 57   SpO2 95%      Mental Status: A & O in no acute distress.  Affect is appropriate.  Speech is fluent.  Recent and remote memory are intact.  Attention span and concentration are normal.     Motor:  Normal bulk and tone all muscle groups of upper and lower extremities.    Strength: 5/5 x 4     Sensory: Sensation intact bilaterally to light touch diminished in bilateral toes to pinprick     Coordination:   tandem gait with imbalance.  Normal gait and station.     Reflexes; supinator, biceps, triceps, knee/ ankle jerk intact 2+/4. No thompson's    IMAGING:  I personally reviewed all radiographic images         Cc:   Abrahan Diez             Again, thank you for allowing me to participate in the care of your patient.        Sincerely,        Karishma Fernandez MD

## 2023-10-17 NOTE — NURSING NOTE
"Jesika Paez is a 77 year old female who presents for:  Chief Complaint   Patient presents with    Consult        Initial Vitals:  BP (!) 146/68   Pulse 57   SpO2 95%  Estimated body mass index is 26.22 kg/m  as calculated from the following:    Height as of 10/6/23: 5' 3\" (1.6 m).    Weight as of 10/6/23: 148 lb (67.1 kg).. There is no height or weight on file to calculate BSA. BP completed using cuff size: regular  Extreme Pain (8)    James Peck    "

## 2023-10-17 NOTE — PROGRESS NOTES
NEUROSURGERY CONSULTATION NOTE    Neurosurgery was asked to see this patient by Letha Wei* for evaluation of cervical radiculopathy.     CONSULTATION ASSESSMENT AND PLAN:    78 yo female who present with neck and arm pain.  X-rays show straightening of her normal lordosis.  Retrolisthesis in extension at cervical 3-4 as well as cervical 4-5.  No other spondylolisthesis.  I of her cervical spine shows moderate spinal canal stenosis at cervical 5-6 with severe foraminal narrowing.  There is mild spinal canal stenosis at cervical 3-4 with mild bilateral foraminal narrowing.  Also she has mild left foraminal narrowing at cervical 6-7.  She is to begin physical therapy this Friday and recommend she follow-up with this and begin physical therapy.  If no relief she could also consider a cervical 7-thoracic 1 interlaminar epidural injection.  If no relief from conservative management she will return to clinic to discuss possible cervical 5-6 anterior cervical decompression and fusion.    Karishma Fernandez MD      HPI:  78 yo female who present with neck and arm pain. Arm pain began in July and is located in her shoulders and into arms. Started in more proximal arms into her elbows bilaterally. On right side traveled done her arm into her hand. Denies numbness and tingling other then her toes. When symptoms are severe she feels she is weak in her right arm. Denies bowel or bladder dysfunction. She complains of a little imbalance. Has fallen in the past.     Begins therapy this Friday. Has not undergone any physical therapy to date for this problem.   Gabapentin, tizanidine, aleve and tylenol- this can help her symptoms be less severe. Aleve helps with headaches.   No prior spine injections.     No past medical history on file.    Past Surgical History:   Procedure Laterality Date    ANKLE FRACTURE SURGERY      CHOLECYSTECTOMY      FRACTURE SURGERY      [facility] OPEN TX BIMALLEOLAR ANKLE FX, INCL INTERNAL  FIXATION      Description: Open Treatment Of Bimalleolar Ankle Fracture;  Recorded: 01/12/2010;    [facility] REMOVAL GALLBLADDER      Description: Cholecystectomy;  Recorded: 01/12/2010;    [facility] REMOVE TONSILS/ADENOIDS,<13 Y/O      Description: Tonsillectomy With Adenoidectomy;  Recorded: 01/12/2010;    HYSTERECTOMY      [facility] ARVIZU W/O FACETEC FORAMOT/DSKC 1/2 VRT SEG, LUMBAR      Description: Laminectomy Decompressive Up To Two Lumbar Segments;  Recorded: 01/12/2010;  Comments: X2 SEPARATE BACK SURGERIES    [facility] LIGATE FALLOPIAN TUBE      Description: Tubal Ligation;  Recorded: 01/12/2010;    [facility] TOTAL ABDOM HYSTERECTOMY      Description: Total Abdominal Hysterectomy;  Recorded: 01/12/2010;    [facility] TOTAL ABDOM HYSTERECTOMY      Description: Hysterectomy;  Recorded: 01/12/2010;       REVIEW OF SYSTEMS:  See ROS form under media     MEDICATIONS:    Current Outpatient Medications   Medication Sig Dispense Refill    Ascorbic Acid (VITAMIN C) 500 MG CAPS       atorvastatin (LIPITOR) 20 MG tablet Take 1 tablet (20 mg) by mouth daily 90 tablet 3    cholecalciferol, vitamin D3, 2,000 unit Tab [CHOLECALCIFEROL, VITAMIN D3, 2,000 UNIT TAB] 2 tabs a day 100 each 3    cyanocobalamin (VITAMIN B-12) 500 MCG tablet Take 500 mcg by mouth once a week      gabapentin (NEURONTIN) 300 MG capsule Take 1 capsule (300 mg) by mouth at bedtime 30 capsule 0    tiZANidine (ZANAFLEX) 2 MG tablet Take 1 tablet (2 mg) by mouth 3 times daily as needed for muscle spasms 30 tablet 0    lactobacillus rhamnosus, GG, (CULTURELL) capsule Take 1 capsule by mouth 2 times daily K Sharmila      naproxen sodium (ALEVE) 220 MG capsule Take 220 mg by mouth 2 times daily (with meals)      Protein POWD Uses a whey blend      UNABLE TO FIND MEDICATION NAME: Primrose supplement      UNABLE TO FIND MEDICATION NAME: Horsetail           ALLERGIES/SENSITIVITIES:     Allergies   Allergen Reactions    Codeine Sulfate [Codeine] Unknown       PERTINENT SOCIAL HISTORY:   Social History      Socioeconomic History    Marital status:      Spouse name: None    Number of children: None    Years of education: None    Highest education level: None   Tobacco Use    Smoking status: Former     Packs/day: 1.00     Years: 30.00     Additional pack years: 0.00     Total pack years: 30.00     Types: Cigarettes     Start date: 1982     Quit date: 2012     Years since quittin.7    Smokeless tobacco: Never   Substance and Sexual Activity    Alcohol use: No    Drug use: No   Social History Narrative    Patient has been  4 times.  She lives with her son.  Her younger son committed suicide at the age of 24.     Social Determinants of Health     Financial Resource Strain: Low Risk  (2023)    Financial Resource Strain     Within the past 12 months, have you or your family members you live with been unable to get utilities (heat, electricity) when it was really needed?: No   Food Insecurity: Low Risk  (2023)    Food Insecurity     Within the past 12 months, did you worry that your food would run out before you got money to buy more?: No     Within the past 12 months, did the food you bought just not last and you didn t have money to get more?: No   Transportation Needs: High Risk (2023)    Transportation Needs     Within the past 12 months, has lack of transportation kept you from medical appointments, getting your medicines, non-medical meetings or appointments, work, or from getting things that you need?: Yes   Interpersonal Safety: Low Risk  (2023)    Interpersonal Safety     Do you feel physically and emotionally safe where you currently live?: Yes     Within the past 12 months, have you been hit, slapped, kicked or otherwise physically hurt by someone?: No     Within the past 12 months, have you been humiliated or emotionally abused in other ways by your partner or ex-partner?: No   Housing Stability: Low Risk  (2023)    Housing Stability     Do you have housing?  : Yes     Are you worried about losing your housing?: No         FAMILY HISTORY:  Family History   Problem Relation Age of Onset    Dementia Mother     Hearing Loss Mother     Heart Disease Father     Hyperlipidemia Father     Hypertension Father     Seizure Disorder Sister     Pneumonia Brother     Arthritis Brother     Chronic Infections Brother     Suicidality Son         PHYSICAL EXAM:   Constitutional: BP (!) 146/68   Pulse 57   SpO2 95%      Mental Status: A & O in no acute distress.  Affect is appropriate.  Speech is fluent.  Recent and remote memory are intact.  Attention span and concentration are normal.     Motor:  Normal bulk and tone all muscle groups of upper and lower extremities.    Strength: 5/5 x 4     Sensory: Sensation intact bilaterally to light touch diminished in bilateral toes to pinprick     Coordination:   tandem gait with imbalance.  Normal gait and station.     Reflexes; supinator, biceps, triceps, knee/ ankle jerk intact 2+/4. No thompson's    IMAGING:  I personally reviewed all radiographic images         Cc:   Abrahan Diez

## 2023-10-17 NOTE — PATIENT INSTRUCTIONS
Ordered cervical-7 to thoracic-1 interlaminar epidural injection.  Read pamphlet about cervical decompression and fusion.  [facility].   Contact neurosurgery if you wish to proceed with surgical options.

## 2023-10-20 ENCOUNTER — THERAPY VISIT (OUTPATIENT)
Dept: PHYSICAL THERAPY | Facility: REHABILITATION | Age: 78
End: 2023-10-20
Attending: FAMILY MEDICINE
Payer: COMMERCIAL

## 2023-10-20 DIAGNOSIS — G89.29 CHRONIC PAIN OF BOTH SHOULDERS: Primary | ICD-10-CM

## 2023-10-20 DIAGNOSIS — M25.512 CHRONIC PAIN OF BOTH SHOULDERS: Primary | ICD-10-CM

## 2023-10-20 DIAGNOSIS — M25.511 CHRONIC PAIN OF BOTH SHOULDERS: Primary | ICD-10-CM

## 2023-10-20 PROCEDURE — 97161 PT EVAL LOW COMPLEX 20 MIN: CPT | Mod: GP | Performed by: PHYSICAL THERAPIST

## 2023-10-20 PROCEDURE — 97110 THERAPEUTIC EXERCISES: CPT | Mod: GP | Performed by: PHYSICAL THERAPIST

## 2023-10-26 ENCOUNTER — THERAPY VISIT (OUTPATIENT)
Dept: PHYSICAL THERAPY | Facility: REHABILITATION | Age: 78
End: 2023-10-26
Payer: COMMERCIAL

## 2023-10-26 DIAGNOSIS — M25.512 CHRONIC PAIN OF BOTH SHOULDERS: Primary | ICD-10-CM

## 2023-10-26 DIAGNOSIS — G89.29 CHRONIC PAIN OF BOTH SHOULDERS: Primary | ICD-10-CM

## 2023-10-26 DIAGNOSIS — M25.511 CHRONIC PAIN OF BOTH SHOULDERS: Primary | ICD-10-CM

## 2023-10-26 PROCEDURE — 97140 MANUAL THERAPY 1/> REGIONS: CPT | Mod: GP | Performed by: PHYSICAL THERAPIST

## 2023-10-26 PROCEDURE — 97110 THERAPEUTIC EXERCISES: CPT | Mod: GP | Performed by: PHYSICAL THERAPIST

## 2023-11-03 ENCOUNTER — MYC MEDICAL ADVICE (OUTPATIENT)
Dept: PHYSICAL MEDICINE AND REHAB | Facility: CLINIC | Age: 78
End: 2023-11-03
Payer: COMMERCIAL

## 2023-11-03 DIAGNOSIS — M54.12 CERVICAL RADICULOPATHY: ICD-10-CM

## 2023-11-03 RX ORDER — GABAPENTIN 300 MG/1
300 CAPSULE ORAL AT BEDTIME
Qty: 30 CAPSULE | Refills: 2 | Status: SHIPPED | OUTPATIENT
Start: 2023-11-03 | End: 2024-01-22

## 2023-11-03 RX ORDER — TIZANIDINE 2 MG/1
2 TABLET ORAL 3 TIMES DAILY PRN
Qty: 30 TABLET | Refills: 2 | Status: SHIPPED | OUTPATIENT
Start: 2023-11-03 | End: 2024-05-01

## 2023-11-07 ENCOUNTER — RADIOLOGY INJECTION OFFICE VISIT (OUTPATIENT)
Dept: PHYSICAL MEDICINE AND REHAB | Facility: CLINIC | Age: 78
End: 2023-11-07
Attending: SURGERY
Payer: COMMERCIAL

## 2023-11-07 VITALS
RESPIRATION RATE: 18 BRPM | HEART RATE: 62 BPM | OXYGEN SATURATION: 98 % | SYSTOLIC BLOOD PRESSURE: 122 MMHG | DIASTOLIC BLOOD PRESSURE: 62 MMHG

## 2023-11-07 DIAGNOSIS — M54.12 CERVICAL RADICULOPATHY: ICD-10-CM

## 2023-11-07 PROCEDURE — 62321 NJX INTERLAMINAR CRV/THRC: CPT | Performed by: PAIN MEDICINE

## 2023-11-07 RX ORDER — LIDOCAINE HYDROCHLORIDE 10 MG/ML
INJECTION, SOLUTION EPIDURAL; INFILTRATION; INTRACAUDAL; PERINEURAL
Status: COMPLETED | OUTPATIENT
Start: 2023-11-07 | End: 2023-11-07

## 2023-11-07 RX ORDER — DEXAMETHASONE SODIUM PHOSPHATE 10 MG/ML
INJECTION, SOLUTION INTRAMUSCULAR; INTRAVENOUS
Status: COMPLETED | OUTPATIENT
Start: 2023-11-07 | End: 2023-11-07

## 2023-11-07 RX ADMIN — LIDOCAINE HYDROCHLORIDE 1 ML: 10 INJECTION, SOLUTION EPIDURAL; INFILTRATION; INTRACAUDAL; PERINEURAL at 13:52

## 2023-11-07 RX ADMIN — DEXAMETHASONE SODIUM PHOSPHATE 10 MG: 10 INJECTION, SOLUTION INTRAMUSCULAR; INTRAVENOUS at 13:52

## 2023-11-07 ASSESSMENT — PAIN SCALES - GENERAL
PAINLEVEL: SEVERE PAIN (7)
PAINLEVEL: SEVERE PAIN (7)

## 2023-11-07 NOTE — PATIENT INSTRUCTIONS
DISCHARGE INSTRUCTIONS    During office hours (8:00 a.m.- 4:00 p.m.) questions or concerns may be answered  by calling [facility] Nurses at  [phone].  Messages received after hours will be returned the following business day.      In the case of an emergency, please dial 911 or seek assistance at the nearest Emergency Room/Urgent Care facility.     All Patients:    You may experience an increase in your symptoms for the first 2 days (It may take anywhere between 2 days- 2 weeks for the steroid to have maximum effect).    You may use ice on the injection site, as frequently as 20 minutes each hour if needed.    You may take your pain medicine.    You may continue taking your regular medication after your injection. If you have had a Medial Branch Block you may resume pain medication once your pain diary is completed.    You may shower. No swimming, tub bath or hot tub for 48 hours.  You may remove your bandaid/bandage as soon as you are home.    You may resume light activities, as tolerated.    Resume your usual diet as tolerated.    It is strongly advised that you do not drive for 1-3 hours post injection.    If you have had oral sedation:  Do not drive for 8 hours post injection.      If you have had IV sedation:  Do not drive for 24 hours post injection.  Do not operate hazardous machinery or make important personal/business decisions for 24 hours.      POSSIBLE STEROID SIDE EFFECTS (If steroid/cortisone was used for your procedure)    -If you experience these symptoms, it should only last for a short period    Swelling of the legs              Skin redness (flushing)     Mouth (oral) irritation   Blood sugar (glucose) levels            Sweats                    Mood changes  Headache  Sleeplessness  Weakened immune system for up to 14 days, which could increase the risk of юлия the COVID-19 virus and/or experiencing more severe symptoms of the disease, if exposed.  Decreased  effectiveness of the flu vaccine if given within 2 weeks of the steroid.         POSSIBLE PROCEDURE SIDE EFFECTS  -Call the [facility] if you are concerned  Increased Pain           Increased numbness/tingling      Nausea/Vomiting          Bruising/bleeding at site      Redness or swelling                                              Difficulty walking      Weakness           Fever greater than 100.5    *In the event of a severe headache after an epidural steroid injection that is relieved by lying down, please call the [facility] to speak with a clinical staff member*

## 2023-11-16 ENCOUNTER — THERAPY VISIT (OUTPATIENT)
Dept: PHYSICAL THERAPY | Facility: REHABILITATION | Age: 78
End: 2023-11-16
Payer: COMMERCIAL

## 2023-11-16 DIAGNOSIS — M25.511 CHRONIC PAIN OF BOTH SHOULDERS: Primary | ICD-10-CM

## 2023-11-16 DIAGNOSIS — M25.512 CHRONIC PAIN OF BOTH SHOULDERS: Primary | ICD-10-CM

## 2023-11-16 DIAGNOSIS — G89.29 CHRONIC PAIN OF BOTH SHOULDERS: Primary | ICD-10-CM

## 2023-11-16 PROCEDURE — 97140 MANUAL THERAPY 1/> REGIONS: CPT | Mod: GP | Performed by: PHYSICAL THERAPIST

## 2023-11-16 PROCEDURE — 97110 THERAPEUTIC EXERCISES: CPT | Mod: GP | Performed by: PHYSICAL THERAPIST

## 2023-11-24 ENCOUNTER — THERAPY VISIT (OUTPATIENT)
Dept: PHYSICAL THERAPY | Facility: REHABILITATION | Age: 78
End: 2023-11-24
Payer: COMMERCIAL

## 2023-11-24 DIAGNOSIS — M25.511 CHRONIC PAIN OF BOTH SHOULDERS: Primary | ICD-10-CM

## 2023-11-24 DIAGNOSIS — G89.29 CHRONIC PAIN OF BOTH SHOULDERS: Primary | ICD-10-CM

## 2023-11-24 DIAGNOSIS — M25.512 CHRONIC PAIN OF BOTH SHOULDERS: Primary | ICD-10-CM

## 2023-11-24 PROCEDURE — 97110 THERAPEUTIC EXERCISES: CPT | Mod: GP | Performed by: PHYSICAL THERAPIST

## 2023-11-30 ENCOUNTER — THERAPY VISIT (OUTPATIENT)
Dept: PHYSICAL THERAPY | Facility: REHABILITATION | Age: 78
End: 2023-11-30
Payer: COMMERCIAL

## 2023-11-30 DIAGNOSIS — M25.512 CHRONIC PAIN OF BOTH SHOULDERS: Primary | ICD-10-CM

## 2023-11-30 DIAGNOSIS — G89.29 CHRONIC PAIN OF BOTH SHOULDERS: Primary | ICD-10-CM

## 2023-11-30 DIAGNOSIS — M25.511 CHRONIC PAIN OF BOTH SHOULDERS: Primary | ICD-10-CM

## 2023-11-30 PROCEDURE — 97110 THERAPEUTIC EXERCISES: CPT | Mod: GP | Performed by: PHYSICAL THERAPIST

## 2023-12-07 ENCOUNTER — OFFICE VISIT (OUTPATIENT)
Dept: PHYSICAL MEDICINE AND REHAB | Facility: CLINIC | Age: 78
End: 2023-12-07
Payer: COMMERCIAL

## 2023-12-07 ENCOUNTER — THERAPY VISIT (OUTPATIENT)
Dept: PHYSICAL THERAPY | Facility: REHABILITATION | Age: 78
End: 2023-12-07
Payer: COMMERCIAL

## 2023-12-07 VITALS — SYSTOLIC BLOOD PRESSURE: 160 MMHG | DIASTOLIC BLOOD PRESSURE: 72 MMHG | HEART RATE: 55 BPM

## 2023-12-07 DIAGNOSIS — M25.511 CHRONIC PAIN OF BOTH SHOULDERS: Primary | ICD-10-CM

## 2023-12-07 DIAGNOSIS — M25.512 CHRONIC PAIN OF BOTH SHOULDERS: Primary | ICD-10-CM

## 2023-12-07 DIAGNOSIS — M54.2 CERVICALGIA: ICD-10-CM

## 2023-12-07 DIAGNOSIS — G89.29 CHRONIC PAIN OF BOTH SHOULDERS: Primary | ICD-10-CM

## 2023-12-07 DIAGNOSIS — M54.12 CERVICAL RADICULOPATHY: Primary | ICD-10-CM

## 2023-12-07 PROCEDURE — 97110 THERAPEUTIC EXERCISES: CPT | Mod: GP | Performed by: PHYSICAL THERAPIST

## 2023-12-07 PROCEDURE — 99215 OFFICE O/P EST HI 40 MIN: CPT | Performed by: NURSE PRACTITIONER

## 2023-12-07 RX ORDER — GABAPENTIN 300 MG/1
CAPSULE ORAL
Qty: 90 CAPSULE | Refills: 0 | Status: SHIPPED | OUTPATIENT
Start: 2023-12-07 | End: 2024-01-22

## 2023-12-07 RX ORDER — TIZANIDINE 2 MG/1
TABLET ORAL
Qty: 60 TABLET | Refills: 0 | Status: SHIPPED | OUTPATIENT
Start: 2023-12-07 | End: 2024-05-01

## 2023-12-07 ASSESSMENT — PAIN SCALES - GENERAL: PAINLEVEL: SEVERE PAIN (6)

## 2023-12-07 NOTE — PROGRESS NOTES
ASSESSMENT: Jesika Paez is a 77 year old female presents for consultation at the request of PCP Abrahan Diez, who presents today for follow-up patient evaluation of :     -Cervical radiculopathy      Patient experienced some mild relief with C7-T1 interlaminar injection, however her pain consistently gets up to an 8-10 on a daily basis first thing in the morning and in the evening.  Tizanidine does not feel like it is helping.  Some initial relief with gabapentin.  Will try increasing these medications slowly.  She is very much hoping to be able to avoid surgical intervention.  We did discuss the potential for a possible bilateral transforaminal C5-6 LAURENCE in the future if symptoms worsen, but for now she will continue PT and we will see if medication changes benefit her.  We reviewed red flag signs and symptoms for which she should call us sooner.  Her exam today is otherwise stable with positive right Maddie, positive bilateral trap tenderness.           9/29/2023     9:24 AM   OSWESTRY DISABILITY INDEX   Count 9   Sum 17   Oswestry Score (%) 37.78 %            Diagnoses and all orders for this visit:  Cervical radiculopathy  -     gabapentin (NEURONTIN) 300 MG capsule; Take 300mg by mouth twice daily for 3 days, then 300mg three times daily ongoing  -     tiZANidine (ZANAFLEX) 2 MG tablet; Take 2 tabs by mouth in the morning prn. Can increase to 2 tabs twice daily prn if tolerating.  Cervicalgia  -     gabapentin (NEURONTIN) 300 MG capsule; Take 300mg by mouth twice daily for 3 days, then 300mg three times daily ongoing  -     tiZANidine (ZANAFLEX) 2 MG tablet; Take 2 tabs by mouth in the morning prn. Can increase to 2 tabs twice daily prn if tolerating.         PLAN:  Reviewed spine anatomy and disease process. Discussed diagnosis and treatment options with the patient today. A shared decision making model was used. The patient's values and choices were respected. The following represents what was  discussed and decided upon by the provider and the patient.     -DIAGNOSTIC TESTS:  Images were personally reviewed and interpreted and explained to patient today using spine model.   -- I did not order any new diagnostic test today    -PHYSICAL THERAPY:    -She will continue physical therapy  Discussed the importance of core strengthening, ROM, stretching exercises with the patient and how each of these entities is important in decreasing pain.  Explained to the patient that the purpose of physical therapy is to teach the patient a home exercise program.  These exercises need to be performed every day in order to decrease pain and prevent future occurrences of pain.    -MEDICATIONS:    -Slowly increase gabapentin to 300 mg 3 times daily as discussed and outlined in her chart today.  -Take 2 tabs of tizanidine in the morning for muscle spasm.  May increase to 2 tabs twice daily if no side effects from this and if feels like it is helping.  If not helping at that dosing, would not continue  -She can continue Aleve at nighttime  -Okay to take Tylenol over-the-counter as well as directed for pain  Discussed multiple medication options today with patient. Discussed risks, side effects, and proper use of medications. Patient verbalized understanding.    -INTERVENTIONS:    Discussed the role of bilateral C5 6 transforaminal cervical epidural steroid injections with patient today.  We could pursue these in the future if symptoms worsen.  -She has had C7-T1 interlaminar epidural steroid injection on November 7.    -PATIENT EDUCATION: Total time of 40 minutes, on the day of service, spent with the patient, reviewing the chart, placing orders, and documenting.   -Today we also discussed the issues related to the pros and cons of the current treatment plan.    -FOLLOW-UP:   Nurse phone call in 2 weeks    Advised patient to call the [facility] if symptoms worsen or if they develop red flag symptoms such as numbness, weakness,  severe pain uncontrolled by current pain med regimen, or any new or worsening problems controlling bladder and bowel function.   ______________________________________________________________________    SUBJECTIVE:   Pain is overall better. Pain is a 8-10/10 in the morning. At night pain is a 10/10. Once she gets moving around, and with stretches from PT she does ok. She has noticed bilateral shoulder pain into both arms R>L. The pain going into the right upper outer and forearm. She had tingling in her fingers today. She saw Dr Fernandez who recommended continued conservative treatment but did offer potential surgery if symptoms did not improve.  Patient is hoping to avoid any surgical intervention. She takes tizanidine in the morning and 1 gabapentin at night. She does not notice any relief from the tizanidine. The gabapentin did help at first, but no longer feels helpful.  She has not experienced any side effects with these medications.  She has been taking one aleve 2-3x per week with relief. She has been taking one nyquil dose during the day for nasal dripping.  Patient is here with her son today        Per previous visit  HPI   Jesika Paez  is a 77 year old female history of osteoporosis who presents today for new patient evaluation of cervical radiculopathy    Jesika's symptoms started in June without injury.  She complains of neck pain which is more right-sided, which radiates into her right shoulder and all the way down her arm into her hand.  She also has some left-sided neck pain into her left upper outer arm only, which is not as bad as on the right.  She complains of numbness in her right arm which radiates into her second through fifth digits on her right hand.  Her symptoms improve when she is moving around during the day.  They get worse at night, and when she rests for a long time in 1 position.  She has had episodic shooting sharp pains in her left posterior scalp behind her left ear  intermittently for years.  This has not gotten worse with these newer symptoms.    She states she waited for about a month before seeing anyone about these symptoms to see if they would go away on their own, but they have not.  She saw her primary care in July regarding this pain and again in September.  She states there was discussion of physical therapy, but patient ultimately decided to do exercises on her own.  In September, with persistent symptoms, she was ultimately referred for spine clinic evaluation.    Her pain today is a 7 out of 10, 10 out of 10 at worst, 5 out of 10 at best.  She has tried doing daily stretching exercises and exercises with weights without relief and the pain has gotten worse to where she needed to take 1 Aleve 2 evenings this week.  She has not tried any other over-the-counter or topical medications.  She has tried a massage pad, which helps the neck pain but does not improve her arm symptoms.    She denies arm weakness, imbalance, changes in bowel or bladder control, leg symptoms.    She has not done any physical therapy, chiropractic care, injections, previous cervical spine surgeries    She has had lumbar surgery.    -Treatment to Date:     -Medications:  -Aleve  -Gabapentin  -Tizanidine    Current Outpatient Medications   Medication    Ascorbic Acid (VITAMIN C) 500 MG CAPS    atorvastatin (LIPITOR) 20 MG tablet    cholecalciferol, vitamin D3, 2,000 unit Tab    cyanocobalamin (VITAMIN B-12) 500 MCG tablet    gabapentin (NEURONTIN) 300 MG capsule    gabapentin (NEURONTIN) 300 MG capsule    lactobacillus rhamnosus, GG, (CULTURELL) capsule    naproxen sodium (ALEVE) 220 MG capsule    Protein POWD    tiZANidine (ZANAFLEX) 2 MG tablet    tiZANidine (ZANAFLEX) 2 MG tablet    UNABLE TO FIND    UNABLE TO FIND     No current facility-administered medications for this visit.       Allergies   Allergen Reactions    Codeine Sulfate [Codeine] Unknown       No past medical history on file.      Patient Active Problem List   Diagnosis    Hyperlipemia    Hearing Loss    Osteopenia    Macular degeneration (senile) of retina    Osteoporosis, unspecified osteoporosis type, unspecified pathological fracture presence       Past Surgical History:   Procedure Laterality Date    ANKLE FRACTURE SURGERY      CHOLECYSTECTOMY      FRACTURE SURGERY      [facility] OPEN TX BIMALLEOLAR ANKLE FX, INCL INTERNAL FIXATION      Description: Open Treatment Of Bimalleolar Ankle Fracture;  Recorded: 01/12/2010;    HC REMOVAL GALLBLADDER      Description: Cholecystectomy;  Recorded: 01/12/2010;    HC REMOVE TONSILS/ADENOIDS,<11 Y/O      Description: Tonsillectomy With Adenoidectomy;  Recorded: 01/12/2010;    HYSTERECTOMY      [facility] ARVIZU W/O FACETEC FORAMOT/DSKC 1/2 VRT SEG, LUMBAR      Description: Laminectomy Decompressive Up To Two Lumbar Segments;  Recorded: 01/12/2010;  Comments: X2 SEPARATE BACK SURGERIES    [facility] LIGATE FALLOPIAN TUBE      Description: Tubal Ligation;  Recorded: 01/12/2010;    [facility] TOTAL ABDOM HYSTERECTOMY      Description: Total Abdominal Hysterectomy;  Recorded: 01/12/2010;    [facility] TOTAL ABDOM HYSTERECTOMY      Description: Hysterectomy;  Recorded: 01/12/2010;       Family History   Problem Relation Age of Onset    Dementia Mother     Hearing Loss Mother     Heart Disease Father     Hyperlipidemia Father     Hypertension Father     Seizure Disorder Sister     Pneumonia Brother     Arthritis Brother     Chronic Infections Brother     Suicidality Son        Reviewed past medical, surgical, and family history with patient found on new patient intake packet located in [employer] tab.     SOCIAL HX: Non-smoker, no alcohol use, previous heavy drinking, positive recreational drug use    Oswestry (JULIANA) Questionnaire:        9/29/2023     9:24 AM   OSWESTRY DISABILITY INDEX   Count 9   Sum 17   Oswestry Score (%) 37.78 %       Neck Disability Index:      10/17/2023    10:00 AM   Neck Disability Index (  [employer]  1991. All rights reserved.; used with permission)   SECTION 1 - PAIN INTENSITY 2   SECTION 2 - PERSONAL CARE 1   SECTION 3 - LIFTING 3   SECTION 4 - READING 2   SECTION 5 - HEADACHES 5   SECTION 6 - CONCENTRATION 2   SECTION 7 - WORK 3   SECTION 8 - DRIVING 5   SECTION 9 - SLEEPING 4   SECTION 10 - RECREATION 3   Count 10   Sum 30   Raw Score: /50 30   Neck Disability Index Score: (%) 60 %          PHQ-2 Score:       2/10/2023     8:08 AM 2/10/2023     8:07 AM   PHQ-2 ( 1999 [employer])   Q1: Little interest or pleasure in doing things 0 0   Q2: Feeling down, depressed or hopeless 0 0   PHQ-2 Score 0 0   Q1: Little interest or pleasure in doing things Not at all    Q2: Feeling down, depressed or hopeless Not at all    PHQ-2 Score 0          OBJECTIVE:  BP (!) 160/72   Pulse 55     PHYSICAL EXAMINATION:  --CONSTITUTIONAL: Vital signs as above. No acute distress. The patient is well nourished and well groomed.  --PSYCHIATRIC: The patient is awake, alert, oriented to person, place, and time, and answering questions appropriately with clear speech. Appropriate mood and affect   --HEENT: Sclera are non-injected. Extraocular muscles are intact. Moist oral mucosa.  --SKIN: Skin over the face, bilateral upper extremities, and posterior torso is clean, dry, intact without rashes.    --RESPIRATORY: Normal rhythm and effort. No abnormal accessory muscle breathing patterns noted.     --GROSS MOTOR: Easily arises from a seated position.  Mild difficulty with tandem gait     --UPPER EXTREMITY MOTOR TESTING:  Shoulder abduction: right 5/5, left 5/5  Triceps: right 5/5 left 5/5  Biceps:right 5/5  left 5/5,   Hand :  right 5/5  left 5/5,   Intrinsics: right 5/5  left 5/5,   Extensors: right 5/5  left 5/5,     --LOWER EXTREMITY MOTOR TESTING  Hip flexion: right 5/5  left 5/5,   Quads:right 5/5  left 5/5,   Hamstrings: right 5/5  left 5/5,   Dorsiflexion: right 5/5  left 5/5,   Plantarflexion: right 5/5  left 5/5,   EHL: right 5/5   left 5/5,     REFLEXES: 2/4 symmetric triceps, biceps, brachioradialis bilaterally. 2/4 symmetric patellar, achilles reflex bilaterally.  Negative Clonus,   Equivocal Babinski  Positive Lynn's on right today, negative on left.      SENSATION: of the upper and lower extremities is intact to light touch.   --VASCULAR: Warm upper and lower limbs bilaterally.     --CERVICAL SPINE: Inspection reveals no evidence of deformity or swelling. Range of motion is not limited in cervical flexion, extension, lateral rotation, head tilt. No point tenderness to palpation of cervical spine.  Bilateral trap tenderness to palpation.  No tenderness of scaps or paraspinal musculature.      RESULTS:   Prior medical records from [facility] and Care Everywhere were reviewed today.         IMAGING:  Personally reviewed images and report, and shown to patient and son at time of visit today.  Narrative & Impression   EXAM: MR CERVICAL SPINE W/O CONTRAST  LOCATION: [facility]  DATE: 10/7/2023     INDICATION: Neck pain with radiculopathy  COMPARISON: None available at time of dictation.  TECHNIQUE: MRI Cervical Spine without IV contrast.     FINDINGS: The spine is imaged from skull base through T3. Straightening of the usual spinal curvature with 1 mm retrolisthesis of C3 over C4. No evidence of acute fracture. No suspicious marrow signal alteration. The visualized posterior fossa and   brainstem are unremarkable. There is flattening/indentation spinal cord without intrinsic signal alteration at C5-C6 level. The paravertebral structures are unremarkable. Maintained vascular flow voids.     Craniovertebral junction and C1-C2: Widely patent.     C2-C3: Normal disc height, mild uncovertebral degenerative change, mild facet arthropathy without significant spinal canal stenosis or neural foraminal narrowing.     C3-C4: 1 mm retrolisthesis, mild to moderate disc height loss with symmetric disc bulge, mild to  moderate uncovertebral degenerative change, mild to moderate facet arthropathy results in minimal spinal canal stenosis and mild bilateral neural foraminal   narrowing.     C4-C5: Mild to moderate disc height loss with symmetric disc bulge, mild to moderate uncovertebral degenerative change, mild to moderate facet arthropathy without significant spinal canal stenosis or neural foraminal narrowing.     C5-C6: Moderate severe disc height loss with symmetric disc bulge and osteophytes in combination with severe uncovertebral degenerative change and moderate severe facet arthropathy results in moderate spinal canal stenosis and advanced bilateral neural   foraminal narrowing.     C6-C7: Mild to moderate disc height loss, mild to moderate uncovertebral degenerative change, mild to moderate facet arthropathy results in mild left neural foraminal narrowing without significant spinal canal stenosis.     C7-T1: Normal disc height and mild facet arthropathy without significant spinal canal stenosis or neural foraminal narrowing.                                                                      IMPRESSION:     1.  Multilevel degenerative change of the cervical spine as described, most prominent at C5-C6 where there is moderate spinal canal stenosis, flattening/indentation of the spinal cord without intrinsic signal alteration, and advanced bilateral neural   foraminal narrowing            Narrative & Impression   EXAM: XR CERVICAL SPINE FLEX/EXT 2/3 VIEWS  LOCATION: [facility]  DATE: 10/17/2023     INDICATION: flexion extension neutral xr  COMPARISON: None.                                                                      IMPRESSION: Straightening of cervical lordosis in neutral position. At C3-C4, retrolisthesis measures 2 mm in extension only. At C4-C5, retrolisthesis measures 2 mm in extension only. No additional fixed listhesis or dynamic subluxation. Vertebral body   heights are maintained.  No aggressive sclerotic or lytic osseous lesion.                Letha Wei FNP-C  [facility]  O. [phone]

## 2023-12-07 NOTE — PATIENT INSTRUCTIONS
Tizanidine (muscle relaxant medication) has been prescribed today. Please take 2 tabs in the morning. You can take 2 tabs twice daily if you do not experience side effects. This medication may cause drowsiness. Please do not work or drive while taking this medication until you know how it affects you. If it does make you drowsy, you should only take it before bedtime or at times that you do not have to work/drive.     Prescribed Gabapentin today, 300 mg tablets, to be titrated up to 1 tablet 3 times a day as tolerated for your nerve pain. Please follow Gabapentin dosing chart below.    Gabapentin 300mg Dosing Chart    DATE  MORNING AFTERNOON BEDTIME    Day 1 1 0 1    Day 2 1 0 1    Day 3 1 0 1    Day 4 1 1 1    Day 5 1 1 1    Day 6 1 1 1     Continue medication, taking 1 capsules three times daily    Please call if you have any questions regarding how to take your medication  Clinic Phone # [phone]        Importance of specialized Physical Therapy:     Today, we discussed the importance of core strengthening, ROM, and stretching exercises, and how each of these are key in decreasing pain.   The purpose of physical therapy is to teach patients a home exercise program individualized to them and their specific health concerns.  These exercises need to be performed every day in order to decrease pain and prevent future occurrences of pain.          You can take over the counter tylenol as directed as needed for your pain as well        A nurse will call you in 2 weeks to see how you are doing. If you are doing better at that time, you are welcome to follow-up as needed. If you are not doing better, the nurse will relay this information so that further recommendations can be made.   Please do not hesitate to contact the clinic at [phone] if you have any questions/concerns, or any worsening of your pain prior to that time. You are also welcome to contact Letha Wei via [employer], but please be aware that  responses to [employer] message may take 2-3 days due to the high volume of patients seen in clinic.

## 2023-12-07 NOTE — LETTER
12/7/2023         RE: Jesika Paez  [address]  [place] [address]        Dear Colleague,    Thank you for referring your patient, Jesika Paez, to the [facility]. Please see a copy of my visit note below.    ASSESSMENT: Jesika Paez is a 77 year old female presents for consultation at the request of PCP Abrahan Diez, who presents today for follow-up patient evaluation of :     -Cervical radiculopathy      Patient experienced some mild relief with C7-T1 interlaminar injection, however her pain consistently gets up to an 8-10 on a daily basis first thing in the morning and in the evening.  Tizanidine does not feel like it is helping.  Some initial relief with gabapentin.  Will try increasing these medications slowly.  She is very much hoping to be able to avoid surgical intervention.  We did discuss the potential for a possible bilateral transforaminal C5-6 LAURENCE in the future if symptoms worsen, but for now she will continue PT and we will see if medication changes benefit her.  We reviewed red flag signs and symptoms for which she should call us sooner.  Her exam today is otherwise stable with positive right Maddie, positive bilateral trap tenderness.           9/29/2023     9:24 AM   OSWESTRY DISABILITY INDEX   Count 9   Sum 17   Oswestry Score (%) 37.78 %            Diagnoses and all orders for this visit:  Cervical radiculopathy  -     gabapentin (NEURONTIN) 300 MG capsule; Take 300mg by mouth twice daily for 3 days, then 300mg three times daily ongoing  -     tiZANidine (ZANAFLEX) 2 MG tablet; Take 2 tabs by mouth in the morning prn. Can increase to 2 tabs twice daily prn if tolerating.  Cervicalgia  -     gabapentin (NEURONTIN) 300 MG capsule; Take 300mg by mouth twice daily for 3 days, then 300mg three times daily ongoing  -     tiZANidine (ZANAFLEX) 2 MG tablet; Take 2 tabs by mouth in the morning prn. Can increase to 2 tabs twice  daily prn if tolerating.         PLAN:  Reviewed spine anatomy and disease process. Discussed diagnosis and treatment options with the patient today. A shared decision making model was used. The patient's values and choices were respected. The following represents what was discussed and decided upon by the provider and the patient.     -DIAGNOSTIC TESTS:  Images were personally reviewed and interpreted and explained to patient today using spine model.   -- I did not order any new diagnostic test today    -PHYSICAL THERAPY:    -She will continue physical therapy  Discussed the importance of core strengthening, ROM, stretching exercises with the patient and how each of these entities is important in decreasing pain.  Explained to the patient that the purpose of physical therapy is to teach the patient a home exercise program.  These exercises need to be performed every day in order to decrease pain and prevent future occurrences of pain.    -MEDICATIONS:    -Slowly increase gabapentin to 300 mg 3 times daily as discussed and outlined in her chart today.  -Take 2 tabs of tizanidine in the morning for muscle spasm.  May increase to 2 tabs twice daily if no side effects from this and if feels like it is helping.  If not helping at that dosing, would not continue  -She can continue Aleve at nighttime  -Okay to take Tylenol over-the-counter as well as directed for pain  Discussed multiple medication options today with patient. Discussed risks, side effects, and proper use of medications. Patient verbalized understanding.    -INTERVENTIONS:    Discussed the role of bilateral C5 6 transforaminal cervical epidural steroid injections with patient today.  We could pursue these in the future if symptoms worsen.  -She has had C7-T1 interlaminar epidural steroid injection on November 7.    -PATIENT EDUCATION: Total time of 40 minutes, on the day of service, spent with the patient, reviewing the chart, placing orders, and  documenting.   -Today we also discussed the issues related to the pros and cons of the current treatment plan.    -FOLLOW-UP:   Nurse phone call in 2 weeks    Advised patient to call the Spine Center if symptoms worsen or if they develop red flag symptoms such as numbness, weakness, severe pain uncontrolled by current pain med regimen, or any new or worsening problems controlling bladder and bowel function.   ______________________________________________________________________    SUBJECTIVE:   Pain is overall better. Pain is a 8-10/10 in the morning. At night pain is a 10/10. Once she gets moving around, and with stretches from PT she does ok. She has noticed bilateral shoulder pain into both arms R>L. The pain going into the right upper outer and forearm. She had tingling in her fingers today. She saw Dr Fernandez who recommended continued conservative treatment but did offer potential surgery if symptoms did not improve.  Patient is hoping to avoid any surgical intervention. She takes tizanidine in the morning and 1 gabapentin at night. She does not notice any relief from the tizanidine. The gabapentin did help at first, but no longer feels helpful.  She has not experienced any side effects with these medications.  She has been taking one aleve 2-3x per week with relief. She has been taking one nyquil dose during the day for nasal dripping.  Patient is here with her son today        Per previous visit  HPI   Jesika Paez  is a 77 year old female history of osteoporosis who presents today for new patient evaluation of cervical radiculopathy    Jesika's symptoms started in June without injury.  She complains of neck pain which is more right-sided, which radiates into her right shoulder and all the way down her arm into her hand.  She also has some left-sided neck pain into her left upper outer arm only, which is not as bad as on the right.  She complains of numbness in her right arm which radiates into her  second through fifth digits on her right hand.  Her symptoms improve when she is moving around during the day.  They get worse at night, and when she rests for a long time in 1 position.  She has had episodic shooting sharp pains in her left posterior scalp behind her left ear intermittently for years.  This has not gotten worse with these newer symptoms.    She states she waited for about a month before seeing anyone about these symptoms to see if they would go away on their own, but they have not.  She saw her primary care in July regarding this pain and again in September.  She states there was discussion of physical therapy, but patient ultimately decided to do exercises on her own.  In September, with persistent symptoms, she was ultimately referred for spine clinic evaluation.    Her pain today is a 7 out of 10, 10 out of 10 at worst, 5 out of 10 at best.  She has tried doing daily stretching exercises and exercises with weights without relief and the pain has gotten worse to where she needed to take 1 Aleve 2 evenings this week.  She has not tried any other over-the-counter or topical medications.  She has tried a massage pad, which helps the neck pain but does not improve her arm symptoms.    She denies arm weakness, imbalance, changes in bowel or bladder control, leg symptoms.    She has not done any physical therapy, chiropractic care, injections, previous cervical spine surgeries    She has had lumbar surgery.    -Treatment to Date:     -Medications:  -Aleve  -Gabapentin  -Tizanidine    Current Outpatient Medications   Medication     Ascorbic Acid (VITAMIN C) 500 MG CAPS     atorvastatin (LIPITOR) 20 MG tablet     cholecalciferol, vitamin D3, 2,000 unit Tab     cyanocobalamin (VITAMIN B-12) 500 MCG tablet     gabapentin (NEURONTIN) 300 MG capsule     gabapentin (NEURONTIN) 300 MG capsule     lactobacillus rhamnosus, GG, (CULTURELL) capsule     naproxen sodium (ALEVE) 220 MG capsule     Protein POWD      tiZANidine (ZANAFLEX) 2 MG tablet     tiZANidine (ZANAFLEX) 2 MG tablet     UNABLE TO FIND     UNABLE TO FIND     No current facility-administered medications for this visit.       Allergies   Allergen Reactions     Codeine Sulfate [Codeine] Unknown       No past medical history on file.     Patient Active Problem List   Diagnosis     Hyperlipemia     Hearing Loss     Osteopenia     Macular degeneration (senile) of retina     Osteoporosis, unspecified osteoporosis type, unspecified pathological fracture presence       Past Surgical History:   Procedure Laterality Date     ANKLE FRACTURE SURGERY       CHOLECYSTECTOMY       FRACTURE SURGERY       [facility] OPEN TX BIMALLEOLAR ANKLE FX, INCL INTERNAL FIXATION      Description: Open Treatment Of Bimalleolar Ankle Fracture;  Recorded: 01/12/2010;     HC REMOVAL GALLBLADDER      Description: Cholecystectomy;  Recorded: 01/12/2010;     HC REMOVE TONSILS/ADENOIDS,<13 Y/O      Description: Tonsillectomy With Adenoidectomy;  Recorded: 01/12/2010;     HYSTERECTOMY       [facility] ARVIZU W/O FACETEC FORAMOT/DSKC 1/2 VRT SEG, LUMBAR      Description: Laminectomy Decompressive Up To Two Lumbar Segments;  Recorded: 01/12/2010;  Comments: X2 SEPARATE BACK SURGERIES     [facility] LIGATE FALLOPIAN TUBE      Description: Tubal Ligation;  Recorded: 01/12/2010;     [facility] TOTAL ABDOM HYSTERECTOMY      Description: Total Abdominal Hysterectomy;  Recorded: 01/12/2010;     [facility] TOTAL ABDOM HYSTERECTOMY      Description: Hysterectomy;  Recorded: 01/12/2010;       Family History   Problem Relation Age of Onset     Dementia Mother      Hearing Loss Mother      Heart Disease Father      Hyperlipidemia Father      Hypertension Father      Seizure Disorder Sister      Pneumonia Brother      Arthritis Brother      Chronic Infections Brother      Suicidality Son        Reviewed past medical, surgical, and family history with patient found on new patient intake packet located in [employer]MR [employer] tab.     SOCIAL HX: Non-smoker, no  alcohol use, previous heavy drinking, positive recreational drug use    Oswestry (JULIANA) Questionnaire:        9/29/2023     9:24 AM   OSWESTRY DISABILITY INDEX   Count 9   Sum 17   Oswestry Score (%) 37.78 %       Neck Disability Index:      10/17/2023    10:00 AM   Neck Disability Index (  Reynold GARRIDO. and Carlos Enrique CANDELARIA. 1991. All rights reserved.; used with permission)   SECTION 1 - PAIN INTENSITY 2   SECTION 2 - PERSONAL CARE 1   SECTION 3 - LIFTING 3   SECTION 4 - READING 2   SECTION 5 - HEADACHES 5   SECTION 6 - CONCENTRATION 2   SECTION 7 - WORK 3   SECTION 8 - DRIVING 5   SECTION 9 - SLEEPING 4   SECTION 10 - RECREATION 3   Count 10   Sum 30   Raw Score: /50 30   Neck Disability Index Score: (%) 60 %          PHQ-2 Score:       2/10/2023     8:08 AM 2/10/2023     8:07 AM   PHQ-2 ( 1999 [employer])   Q1: Little interest or pleasure in doing things 0 0   Q2: Feeling down, depressed or hopeless 0 0   PHQ-2 Score 0 0   Q1: Little interest or pleasure in doing things Not at all    Q2: Feeling down, depressed or hopeless Not at all    PHQ-2 Score 0          OBJECTIVE:  BP (!) 160/72   Pulse 55     PHYSICAL EXAMINATION:  --CONSTITUTIONAL: Vital signs as above. No acute distress. The patient is well nourished and well groomed.  --PSYCHIATRIC: The patient is awake, alert, oriented to person, place, and time, and answering questions appropriately with clear speech. Appropriate mood and affect   --HEENT: Sclera are non-injected. Extraocular muscles are intact. Moist oral mucosa.  --SKIN: Skin over the face, bilateral upper extremities, and posterior torso is clean, dry, intact without rashes.    --RESPIRATORY: Normal rhythm and effort. No abnormal accessory muscle breathing patterns noted.     --GROSS MOTOR: Easily arises from a seated position.  Mild difficulty with tandem gait     --UPPER EXTREMITY MOTOR TESTING:  Shoulder abduction: right 5/5, left 5/5  Triceps: right 5/5 left 5/5  Biceps:right 5/5  left 5/5,   Hand :  right  5/5  left 5/5,   Intrinsics: right 5/5  left 5/5,   Extensors: right 5/5  left 5/5,     --LOWER EXTREMITY MOTOR TESTING  Hip flexion: right 5/5  left 5/5,   Quads:right 5/5  left 5/5,   Hamstrings: right 5/5  left 5/5,   Dorsiflexion: right 5/5  left 5/5,   Plantarflexion: right 5/5  left 5/5,   EHL: right 5/5  left 5/5,     REFLEXES: 2/4 symmetric triceps, biceps, brachioradialis bilaterally. 2/4 symmetric patellar, achilles reflex bilaterally.  Negative Clonus,   Equivocal Babinski  Positive Lynn's on right today, negative on left.      SENSATION: of the upper and lower extremities is intact to light touch.   --VASCULAR: Warm upper and lower limbs bilaterally.     --CERVICAL SPINE: Inspection reveals no evidence of deformity or swelling. Range of motion is not limited in cervical flexion, extension, lateral rotation, head tilt. No point tenderness to palpation of cervical spine.  Bilateral trap tenderness to palpation.  No tenderness of scaps or paraspinal musculature.      RESULTS:   Prior medical records from [facility] and Care Everywhere were reviewed today.         IMAGING:  Personally reviewed images and report, and shown to patient and son at time of visit today.  Narrative & Impression   EXAM: MR CERVICAL SPINE W/O CONTRAST  LOCATION: [facility]  DATE: 10/7/2023     INDICATION: Neck pain with radiculopathy  COMPARISON: None available at time of dictation.  TECHNIQUE: MRI Cervical Spine without IV contrast.     FINDINGS: The spine is imaged from skull base through T3. Straightening of the usual spinal curvature with 1 mm retrolisthesis of C3 over C4. No evidence of acute fracture. No suspicious marrow signal alteration. The visualized posterior fossa and   brainstem are unremarkable. There is flattening/indentation spinal cord without intrinsic signal alteration at C5-C6 level. The paravertebral structures are unremarkable. Maintained vascular flow voids.      Craniovertebral junction and C1-C2: Widely patent.     C2-C3: Normal disc height, mild uncovertebral degenerative change, mild facet arthropathy without significant spinal canal stenosis or neural foraminal narrowing.     C3-C4: 1 mm retrolisthesis, mild to moderate disc height loss with symmetric disc bulge, mild to moderate uncovertebral degenerative change, mild to moderate facet arthropathy results in minimal spinal canal stenosis and mild bilateral neural foraminal   narrowing.     C4-C5: Mild to moderate disc height loss with symmetric disc bulge, mild to moderate uncovertebral degenerative change, mild to moderate facet arthropathy without significant spinal canal stenosis or neural foraminal narrowing.     C5-C6: Moderate severe disc height loss with symmetric disc bulge and osteophytes in combination with severe uncovertebral degenerative change and moderate severe facet arthropathy results in moderate spinal canal stenosis and advanced bilateral neural   foraminal narrowing.     C6-C7: Mild to moderate disc height loss, mild to moderate uncovertebral degenerative change, mild to moderate facet arthropathy results in mild left neural foraminal narrowing without significant spinal canal stenosis.     C7-T1: Normal disc height and mild facet arthropathy without significant spinal canal stenosis or neural foraminal narrowing.                                                                      IMPRESSION:     1.  Multilevel degenerative change of the cervical spine as described, most prominent at C5-C6 where there is moderate spinal canal stenosis, flattening/indentation of the spinal cord without intrinsic signal alteration, and advanced bilateral neural   foraminal narrowing            Narrative & Impression   EXAM: XR CERVICAL SPINE FLEX/EXT 2/3 VIEWS  LOCATION: [facility]  DATE: 10/17/2023     INDICATION: flexion extension neutral xr  COMPARISON: None.                                                                       IMPRESSION: Straightening of cervical lordosis in neutral position. At C3-C4, retrolisthesis measures 2 mm in extension only. At C4-C5, retrolisthesis measures 2 mm in extension only. No additional fixed listhesis or dynamic subluxation. Vertebral body   heights are maintained. No aggressive sclerotic or lytic osseous lesion.                Letha CORDOVA-C  [facility]  O. [phone]      Again, thank you for allowing me to participate in the care of your patient.        Sincerely,        HILL Mendez CNP

## 2023-12-28 ENCOUNTER — TELEPHONE (OUTPATIENT)
Dept: PHYSICAL MEDICINE AND REHAB | Facility: CLINIC | Age: 78
End: 2023-12-28
Payer: COMMERCIAL

## 2023-12-28 NOTE — TELEPHONE ENCOUNTER
Spoke to pt again with Letha's recommendation to discontinue tizanidine and reduce gabapentin to 300mgs twice daily. Pt verbalized understanding. Pt will also contact PCP if hallucinations don't resolve within 12 hours.

## 2023-12-28 NOTE — TELEPHONE ENCOUNTER
Recommend stopping tizanidine.  Continue gabapentin as prescribed for now.    If hallucinations do not resolve in 12hr, would recommend she taper gabapentin to 300mg twice daily  and contact her PCP for further evaluation and rule out other potential causes (UTI etc).

## 2023-12-28 NOTE — TELEPHONE ENCOUNTER
PSP:  Letha  Last clinic visit:  12-7-23  Reason for call: Medication question  Clinical information:  Pt was prescribed tizanidine at her last visit. Has increased to 4 tabs per day as directed and has been experiencing hallucinations. After the first several hallucinations she decided to look at the side effects of tizanidine and read that hallucinations are listed. She also states that this medication has made her drowsy and not decreased her pain. Pt is also currently taking 300mgs 3 times daily gabapentin as directed. She would like to know if she can discontinue tizanidine and if there are any other medication options.   Provider to address: please advise

## 2024-01-04 ENCOUNTER — THERAPY VISIT (OUTPATIENT)
Dept: PHYSICAL THERAPY | Facility: REHABILITATION | Age: 79
End: 2024-01-04
Payer: COMMERCIAL

## 2024-01-04 DIAGNOSIS — G89.29 CHRONIC PAIN OF BOTH SHOULDERS: Primary | ICD-10-CM

## 2024-01-04 DIAGNOSIS — M25.512 CHRONIC PAIN OF BOTH SHOULDERS: Primary | ICD-10-CM

## 2024-01-04 DIAGNOSIS — M25.511 CHRONIC PAIN OF BOTH SHOULDERS: Primary | ICD-10-CM

## 2024-01-04 PROCEDURE — 97110 THERAPEUTIC EXERCISES: CPT | Mod: GP | Performed by: PHYSICAL THERAPIST

## 2024-01-04 NOTE — PROGRESS NOTES
"    DISCHARGE  Reason for Discharge: Patient chooses to discontinue therapy.  Patient has achieved satisfactory improvements with symptoms and wishes to continue independently with HEP    Equipment Issued: theraband    Discharge Plan: Patient to continue home program.    Referring Provider:  Abrahan Diez       01/04/24 0500   Appointment Info   Signing clinician's name / credentials Fernando Malone, PT   Total/Authorized Visits 12   Visits Used 7   Medical Diagnosis M25.511, G89.29, M25.512 (ICD-10-CM) - Chronic pain of both shoulders   PT Tx Diagnosis neck and arm pain with mobility impairments   Precautions/Limitations osteoporosis   Quick Adds Certification   Progress Note/Certification   Start of Care Date 10/20/23   Onset of illness/injury or Date of Surgery 07/20/23   Therapy Frequency weekly   Predicted Duration 12 weeks   Certification date from 10/20/23   Certification date to 01/12/24   Progress Note Due Date 01/12/24   GOALS   PT Goals 2   PT Goal 1   Goal Identifier HEP   Goal Description Patient will demonstrate >50% compliance with home exercise program to promote independence and progress towards   Goal Progress met   Target Date 01/12/24   PT Goal 2   Goal Identifier overhead reaching   Goal Description Patient will perform overhead reaching above 135 degrees without pain to improve tolerance with household chores   Goal Progress not met   Target Date 01/12/24   Subjective Report   Subjective Report reports left arm has been \"really good\". having a dull pain in right shoulder this morning.  reports everything was feeling really good up until last night. was having occasional twinges with certain movements.   Objective Measures   Objective Measures Objective Measure 1   Objective Measure 1   Objective Measure cervical rotation   Details 55 degrees bilaterally (supine)   Treatment Interventions (PT)   Interventions Therapeutic Procedure/Exercise;Self Care/Home Management   Therapeutic " Procedure/Exercise   Therapeutic Procedures: strength, endurance, ROM, flexibillity minutes (57768) 40   Ther Proc 1 - Details UBE seat 13 (3 min forward/backward for 6 min total, subjective gathered). supine shoulder flexion A/ROM x 10 reps (aching in worsening through repetitions). supine chin tucks x 10 reps. standing shoulder ER x 15 reps. shoulder ER A/ROM x 15 reps. reach and roll x 8 reps B. shoulder external rotation x 15 reps. wall push ups x 15 reps. standing shoulder abduction x 10 reps (painful into right lateral upper arm). supine scapular protractions x 10 reps. side lying shoulder abduction x 10 reps. side lying shoulder ER x 10 reps. side lying shoulder flexion x10 reps (unable to maintain elbow extension due to difficulty/scapular weakness). standing chin tucks against wall x10 reps. standing shoulder abduction using wand x 10 reps. countertop push-ups x 10 reps. bent over rows 2# x 10 reps. bicep curls 2# x15 reps   Skilled Intervention Reviewed patients current home exercise program to ensure safety and appropriateness. Patient was also instructed in their home exercise program. Patient performed at least 1 set of each exercise during the session. Cues were provided to ensure proper movement and control. Patient reported appropriate tolerance with each exercise.   Patient Response/Progress upper arm pain with reach and roll, encouraged to perform less repetitions at home or discontinue if continuing to have pain   Education   Learner/Method Patient   Plan   Plan for next session Discharged   Total Session Time   Timed Code Treatment Minutes 40   Total Treatment Time (sum of timed and untimed services) 40     Fernando Malone, PT, DPT  Board-Certified Clinical Specialist in Orthopaedic Physical Therapy

## 2024-01-22 ENCOUNTER — MYC MEDICAL ADVICE (OUTPATIENT)
Dept: PHYSICAL MEDICINE AND REHAB | Facility: CLINIC | Age: 79
End: 2024-01-22
Payer: COMMERCIAL

## 2024-01-22 DIAGNOSIS — M54.2 CERVICALGIA: ICD-10-CM

## 2024-01-22 DIAGNOSIS — M54.12 CERVICAL RADICULOPATHY: ICD-10-CM

## 2024-01-22 RX ORDER — GABAPENTIN 300 MG/1
300 CAPSULE ORAL 2 TIMES DAILY
Qty: 180 CAPSULE | Refills: 0 | Status: SHIPPED | OUTPATIENT
Start: 2024-01-22 | End: 2024-04-15

## 2024-03-04 ENCOUNTER — TRANSFERRED RECORDS (OUTPATIENT)
Dept: HEALTH INFORMATION MANAGEMENT | Facility: CLINIC | Age: 79
End: 2024-03-04
Payer: COMMERCIAL

## 2024-04-15 DIAGNOSIS — M54.12 CERVICAL RADICULOPATHY: ICD-10-CM

## 2024-04-15 DIAGNOSIS — M54.2 CERVICALGIA: ICD-10-CM

## 2024-04-15 RX ORDER — GABAPENTIN 300 MG/1
300 CAPSULE ORAL 2 TIMES DAILY
Qty: 180 CAPSULE | Refills: 0 | Status: SHIPPED | OUTPATIENT
Start: 2024-04-15 | End: 2024-05-15

## 2024-04-15 NOTE — TELEPHONE ENCOUNTER
Last appointment: 12/7/23  Next appointment: none    Notes/Comments: Last filled 1/22/24 #180 with no refills      Rx request(s) has been reviewed.

## 2024-04-18 ENCOUNTER — MYC MEDICAL ADVICE (OUTPATIENT)
Dept: PHYSICAL MEDICINE AND REHAB | Facility: CLINIC | Age: 79
End: 2024-04-18
Payer: COMMERCIAL

## 2024-04-19 NOTE — TELEPHONE ENCOUNTER
Would recommend continuing the gabapentin and contacting neurosurgery to see whether surgery can be booked or if would need to be seen again by them prior

## 2024-04-19 NOTE — TELEPHONE ENCOUNTER
"Phone call to patient to relay PSP's recommendations. Discussed continuing the Gabapentin 300 mg at BID for now. Refill had been sent in on 4/15. She will contact her pharmacy for delivery.    Explained she should reach out to [facility] and let them know she is ready to move forward with surgery. Contact information provided.      Did explain I can pass on this information to Dr. Fernandez's team and she will be contacted with next steps. Stated understanding and appreciation for call back.     *Per consult with Dr. Fernandez in October, \"If no relief from conservative management she will return to clinic to discuss possible cervical 5-6 anterior cervical decompression and fusion.\"     "

## 2024-04-25 ENCOUNTER — PREP FOR PROCEDURE (OUTPATIENT)
Dept: NEUROLOGY | Facility: CLINIC | Age: 79
End: 2024-04-25

## 2024-04-25 ENCOUNTER — OFFICE VISIT (OUTPATIENT)
Dept: NEUROSURGERY | Facility: CLINIC | Age: 79
End: 2024-04-25
Payer: COMMERCIAL

## 2024-04-25 ENCOUNTER — MYC MEDICAL ADVICE (OUTPATIENT)
Dept: PHYSICAL MEDICINE AND REHAB | Facility: CLINIC | Age: 79
End: 2024-04-25

## 2024-04-25 VITALS — SYSTOLIC BLOOD PRESSURE: 193 MMHG | HEART RATE: 53 BPM | OXYGEN SATURATION: 95 % | DIASTOLIC BLOOD PRESSURE: 80 MMHG

## 2024-04-25 DIAGNOSIS — M47.12 CERVICAL SPONDYLOSIS WITH MYELOPATHY: ICD-10-CM

## 2024-04-25 DIAGNOSIS — M54.12 CERVICAL RADICULOPATHY: Primary | ICD-10-CM

## 2024-04-25 DIAGNOSIS — M48.02 CERVICAL STENOSIS OF SPINAL CANAL: ICD-10-CM

## 2024-04-25 PROCEDURE — 99214 OFFICE O/P EST MOD 30 MIN: CPT | Performed by: SURGERY

## 2024-04-25 ASSESSMENT — PAIN SCALES - GENERAL: PAINLEVEL: SEVERE PAIN (7)

## 2024-04-25 NOTE — PROGRESS NOTES
NEUROSURGERY FOLLOW UP  NOTE    Jesika Paez comes today in follow-up. Patient is a 77 yo female who present with neck and arm pain.  X-rays show straightening of her normal lordosis.  Retrolisthesis in extension at cervical 3-4 as well as cervical 4-5.  No other spondylolisthesis.  MRI of her cervical spine shows moderate spinal canal stenosis at cervical 5-6 with severe foraminal narrowing.  There is mild spinal canal stenosis at cervical 3-4 with mild bilateral foraminal narrowing.  Also she has mild left foraminal narrowing at cervical 6-7.  She is to begin physical therapy this Friday and recommend she follow-up with this and begin physical therapy.  If no relief she could also consider a cervical 7-thoracic 1 interlaminar epidural injection.  If no relief from conservative management she will return to clinic to discuss possible cervical 5-6 anterior cervical decompression and fusion.     Did improve with physical therapy but she continues to experience neck and arm pain. Bilateral arms R>L. Radiates down her arm through her bicep and sometimes down into her hands. Gabapentin is not working.     Osteoporosis diagnosed. Did have past treatment. Not currently on any medication given she did not improve with treatment per patient.      PHYSICAL EXAM:   Constitutional: BP (!) 193/80   Pulse 53   SpO2 95%      Mental Status: A & O in no acute distress.  Affect is appropriate.  Speech is fluent.  Recent and remote memory are intact.  Attention span and concentration are normal.     Motor:  Normal bulk and tone all muscle groups of upper and lower extremities. 5/5x4     Sensory: Sensation intact bilaterally to light touch.      Coordination:   Ambulates with a cane     Reflexes; supinator, biceps, triceps, knee/ ankle jerk intact- brisk throughout.  No hoffmans    IMAGING:   I personally reviewed all radiographic images        CONSULTATION ASSESSMENT AND PLAN:    Symptoms appear most related to her central  and foraminal narrowing at cervical 5-6.  She would be high risk for fusion failure given her osteoporosis with failure to improve osteoporosis with past treatment. We discussed a right open door laminoplasty at cervical 5-cervical 6 with bilateral foraminotomies.  Risks and benefits were discussed in detail including but not limited to infection, hematoma, nerve damage including paralysis, post op radiculitis, durotomy, post laminoplasty kyphosis, neck pain, hardware malfunction, risks associated with the use of general anesthesia. No further conservative care is indicated and the surgery is medically necessary given further physical therapy is not indicated in this case as it would only prolong the patient s suffering without providing further benefit and the delay would increase the risk of neurologic decline and/or symptoms worsening unnecessarily, with no benefit to the patient and possible harm.     There are no untreated, underlying mental health conditions (including but not limited to psychological conditions or drug or alcohol abuse) that will preclude an appropriate recovery from this surgery or that are contraindications to proceeding with surgery.       Karishma Fernandez MD      CC:     Abrahan Diez P  [address] [address]

## 2024-04-25 NOTE — LETTER
4/25/2024         RE: Jesika Paez  [address]  [place] [address]        Dear Colleague,    Thank you for referring your patient, Jesika Paez, to the [facility]. Please see a copy of my visit note below.    NEUROSURGERY FOLLOW UP  NOTE    Jesika Paez comes today in follow-up. Patient is a 79 yo female who present with neck and arm pain.  X-rays show straightening of her normal lordosis.  Retrolisthesis in extension at cervical 3-4 as well as cervical 4-5.  No other spondylolisthesis.  MRI of her cervical spine shows moderate spinal canal stenosis at cervical 5-6 with severe foraminal narrowing.  There is mild spinal canal stenosis at cervical 3-4 with mild bilateral foraminal narrowing.  Also she has mild left foraminal narrowing at cervical 6-7.  She is to begin physical therapy this Friday and recommend she follow-up with this and begin physical therapy.  If no relief she could also consider a cervical 7-thoracic 1 interlaminar epidural injection.  If no relief from conservative management she will return to clinic to discuss possible cervical 5-6 anterior cervical decompression and fusion.     Did improve with physical therapy but she continues to experience neck and arm pain. Bilateral arms R>L. Radiates down her arm through her bicep and sometimes down into her hands. Gabapentin is not working.     Osteoporosis diagnosed. Did have past treatment. Not currently on any medication given she did not improve with treatment per patient.      PHYSICAL EXAM:   Constitutional: BP (!) 193/80   Pulse 53   SpO2 95%      Mental Status: A & O in no acute distress.  Affect is appropriate.  Speech is fluent.  Recent and remote memory are intact.  Attention span and concentration are normal.     Motor:  Normal bulk and tone all muscle groups of upper and lower extremities. 5/5x4     Sensory: Sensation intact bilaterally to light touch.       Coordination:   Ambulates with a cane     Reflexes; supinator, biceps, triceps, knee/ ankle jerk intact- brisk throughout.  No hoffmans    IMAGING:   I personally reviewed all radiographic images        CONSULTATION ASSESSMENT AND PLAN:    Symptoms appear most related to her central and foraminal narrowing at cervical 5-6.  She would be high risk for fusion failure given her osteoporosis with failure to improve osteoporosis with past treatment. We discussed a right open door laminoplasty at cervical 5-cervical 6 with bilateral foraminotomies.  Risks and benefits were discussed in detail including but not limited to infection, hematoma, nerve damage including paralysis, post op radiculitis, durotomy, post laminoplasty kyphosis, neck pain, hardware malfunction, risks associated with the use of general anesthesia. No further conservative care is indicated and the surgery is medically necessary given further physical therapy is not indicated in this case as it would only prolong the patient s suffering without providing further benefit and the delay would increase the risk of neurologic decline and/or symptoms worsening unnecessarily, with no benefit to the patient and possible harm.     There are no untreated, underlying mental health conditions (including but not limited to psychological conditions or drug or alcohol abuse) that will preclude an appropriate recovery from this surgery or that are contraindications to proceeding with surgery.       Karishma Fernandez MD      CC:     Abrahan Diez  [address] [facility] [address] [address]      Again, thank you for allowing me to participate in the care of your patient.        Sincerely,        Karishma Fernandez MD

## 2024-04-25 NOTE — NURSING NOTE
"Jesika Paez is a 78 year old female who presents for:  Chief Complaint   Patient presents with    Follow Up     Neck pain into both shoulders and sometimes into arms, right>left  C7-T1 interlaminar 11/7/23 - relief of up to 75%, but gradually wore off  Also having nicholas LE muscle cramping        Initial Vitals:  BP (!) 193/80   Pulse 53   SpO2 95%  Estimated body mass index is 26.22 kg/m  as calculated from the following:    Height as of 10/6/23: 5' 3\" (1.6 m).    Weight as of 10/6/23: 148 lb (67.1 kg).. There is no height or weight on file to calculate BSA. BP completed using cuff size: regular  Severe Pain (7)    Nursing Comments: Jesika to follow up with Primary Care provider regarding elevated blood pressure.        Neck Disability Index (  Reynold GARRIDO. and Carlos Enrique CANDELARIA. 1991. All rights reserved.; used with permission)    SECTION 1 - PAIN INTENSITY: The pain is very severe at the moment.  SECTION 2 - PERSONAL CARE: It is painful to look after myself, and I am slow and careful.  SECTION 3 - LIFTING: Neck pain prevents me from lifting heavy weights off the floor but I can manage if items are conveniently positioned, ie. on a table.  SECTION 4 - READING: I can't read as much as I want because of moderate neck pain.  SECTION 5 - HEADACHES: I have moderate headaches that come frequently.  SECTION 6 - CONCENTRATION: I have a lot of difficulty concentrating.  SECTION 7 - WORK: I can't do my usual work.  SECTION 8 - DRIVING: I can't drive my car at all because of neck pain.  SECTION 9 - SLEEPING: My sleep is completely disturbed for up to 5-7 hours.  SECTION 10 - RECREATION: I can't do any recreational activities due to neck pain.  Count: 10  Sum: 35  Raw Score: /50: 35  Neck Disability Index Score: (%): 70 %         Olman Desai  "

## 2024-04-25 NOTE — PATIENT INSTRUCTIONS
Recommend right open door laminoplasty at cervical 5-cervical 6 with bilateral foraminotomies       Please review COMPLETE information about your proposed surgery, pre-operative requirements, post-operative course and expectations - available in a folder provided to you in clinic!    Your surgery scheduler will call you within 3 business days to begin the process of scheduling your surgery and appointments.     Pre-Operative    Pre-operative physical / Lab work with primary care physician within 30 days of surgical date. We prefer the pre-op exam to be done 2 weeks prior to surgery. We also prefer pre-op lab work be done at [facility] or [facility] outpatient lab, 2 weeks prior to surgery.     If all pre-op appointments/test are not completed prior to your surgery date, you will be asked to reschedule your surgery.           As part of preparation for your upcoming procedure your primary care doctor may order a test to rule out a COVID-19 infection. This is no longer a requirement prior to surgery.     Readiness Visits    Prior to surgery, you may have a telephone or in person readiness visit with our RN team to discuss your upcomming surgery, results of your pre-op physical, and lab work.   If you will require a collar/neck brace after surgery, you will be fitted for one at your readiness visit prior to surgery (scheduled by the surgery scheduler).     Shower procedure    Hibiclens shower: Use one packet the night before surgery and one packet the morning of surgery for a whole body shower. Avoid face, hair, and genitals.      Eating/Drinking    Stop all solid foods 8 hours before surgery.  Stop all clear liquids 2 hours prior to arrival time     Clear liquids include water, clear juice, black coffee, or clear tea without milk, Gatorade, clear soda.     Medications - please refer to the pre-operative medication instructions sheet in your folder    Hold Aspirin, NSAIDs (Advil/Ibuprofen, Indocin,  Naproxen,Nuprin,Relafen/Nabumetone, Diclofenac,Meloxicam, Aleve, Celebrex) and all vitamins and supplements x 7-10 days prior to surgical date  You can take Tylenol (Acetaminophen) for pain up until the date of your surgery   Do not exceed 3,000 mg per day   Any other medications prescribed, please discuss with your primary care provider at your pre-operative physical. Please discuss when/if it is safe for you to stop taking blood thinners with your primary care provider.   We will NOT provide pain medications prior to surgery. We will prescribe post-op pain medications for up to 6 weeks after surgery.       FMLA/Short-term disability    If you are currently employed, you will likely need to be off work for 4-6 weeks for post-op recovery and healing.  Please fax any FMLA/short term disability paperwork to [phone], mail it into the clinic, drop it off in person, or send via a [employer] message.   You may also call our clinic with the date in which you'd like to return to work, and we can provide a work letter to your employer  We will support Short-Term Disability up to 12 weeks, beginning the date of your surgery. We do NOT support Long-Term Disability/Social Security Benefits.     Pain Management after surgery    Dealing with pain    As your body heals, you might feel a stabbing, burning, or aching pain. You may also have some numbness.  Everyone feels pain differently, we may ask you to rate your pain using a pain scale. This will let us know how much pain you feel.   Keep in mind that medicine won't take away all of your pain. It helps to try other ways to relax and ease pain.     Things to help with pain    After surgery, we will give you medicine for your pain. These medications work well, but they can make you drowsy, itchy, or sick to your stomach, and constipated. Try to take narcotics with food if they cause nausea.   For mild to moderate pain, you can take medication such as Tylenol or Ibuprofen. These  can be used with narcotics and muscle relaxants. *If you have had a fusion: do NOT use NSAIDs for 6 months after surgery.   Do NOT drive while taking narcotic pain medication  Do NOT drink alcohol while using narcotic pain medication  You can utilize ice as needed (no longer than 20 minutes at one time) you may apply this over your covered incision  Utilize heat for muscle spasms, do not apply heat over your incision  If a muscle relaxer is prescribed, please do NOT take it at the same time as your narcotic pain medication. Take them at least 90 minutes apart.   You may also use pain cream/patches on sore muscles. Do NOT apply these on your incision. Patches may be cut in 1/2 if needed.     *After your surgery, if you will be staying in-patient, a nursing team will be monitoring you closely throughout your stay and communicate your health status to your surgeon and other providers.  You will be seen by Advanced Practice Providers (e.g., nurse practitioners, clinic nurse specialists, and physician assistants) who will check on you regularly to assess the status of your surgical recovery.     Incision Care    Look at your incision site every day. You  may need a mirror, or family member to help you.   Do not submerge your incision in water such as pools, hot tubs, baths for at least 6 weeks or until incision is healed  You may get your incision wet in the shower. Allow water and soap to run over incision, and gently pat dry.   Remove the dressing the day after you are discharged from the hospital. Keep the incision clean and dry at all times. This may require several bandage changes.   Contact us right away if you have:   Fever over 101 degrees farenheit  Green or yellow drainage (pus) from your incision or increased bloody drainage   Redness, swelling, or warmth at your surgery site   Notify the clinic [phone].    Activity Restrictions    For the first 6 weeks, no lifting,pushing, or pulling > 5-10 pounds, no  bending, twisting.  Use the stairs in moderation   Walking: Walking is the best way to start exercise after surgery. Take short frequent walks. You may gradually increase the distance as tolerated. If you feel pain, decrease your activity, but DO NOT stop walking. Walking will help you regain strength.  Avoid prolonged positioning for longer than 30 minutes (change positions frequently while awake)  No contact sports until after follow up visit  No high impact activities such as; running/jogging, snowmobile or 4 carranza riding or any other recreational vehicles until deemed safe by your surgeon/care team.   Please call the clinic if you develop any of the following symptoms:  Swelling and/or warmth in one or both legs  Pain or tenderness in your leg, ankle, foot, or arm   Red or discolored/pale skin     Post-Op Follow Up Appointments    We will call you to schedule these appointments after your discharge from the hospital.   Incision assessment within 2 weeks with a Registered Nurse   6 week post-op with a Nurse Practitioner/Physician Assistant. Your surgeon will be available on this day.

## 2024-04-26 ENCOUNTER — MYC MEDICAL ADVICE (OUTPATIENT)
Dept: PHYSICAL MEDICINE AND REHAB | Facility: CLINIC | Age: 79
End: 2024-04-26
Payer: COMMERCIAL

## 2024-04-26 ENCOUNTER — TELEPHONE (OUTPATIENT)
Dept: NEUROSURGERY | Facility: CLINIC | Age: 79
End: 2024-04-26
Payer: COMMERCIAL

## 2024-04-26 NOTE — TELEPHONE ENCOUNTER
Phone call was made to patient.     Discussed increasing her Gabapentin 300 mg to 300-300-600. Also recommended she return for follow up with [facility] as she was last in clinic early December 2023.     See other MyChart encounter as well.

## 2024-04-26 NOTE — TELEPHONE ENCOUNTER
Physical Therapy Discharge Summary    Reason for therapy discharge:    Discharged to transitional care facility.    Progress towards therapy goal(s). See goals on Care Plan in [facility] electronic health record for goal details.  Goals partially met.  Barriers to achieving goals:   discharge from facility.    Therapy recommendation(s):    Continued therapy is recommended.  Rationale/Recommendations: Pt below baseline mobility. Has been staying at [facility] since September. Has been able to ambulate to bathroom independently w/ FWW. Currently a heavy A x1 w/ mobility and close WC follow for gait. Anticipate will need TCU at [facility] to improve upon functional mobility and independence.  PT Brief overview of current status: Min A for bed mobility, Min to mod for transfers w/ FWW, Min to mod for ambulation w/ FWW      Recommendation above provided by last treating therapist.            Returned call to discuss. Patient did not know the name of medication being injected, provided information for her clinic [facility], contacted clinic. Drug injected during the procedure is Avastin Green. Discussed with BARBARA and no issue receiving injection before surgery. Patient notified.

## 2024-04-26 NOTE — TELEPHONE ENCOUNTER
Patient is requesting a return call. She would like to know if her eye injections scheduled on 6/13 will effect her surgery that's scheduled on 6/14. She can be reached back at [phone].

## 2024-04-26 NOTE — TELEPHONE ENCOUNTER
"Phone call to patient to discuss this message as well as message from last evening.     Discussed increasing her dosing to Gabapentin 300 mg 1 in AM and afternoon and 2 at HS. She may continue to take the Tylenol as needed for headache. \"I can't remember when I haven't had this headache.\"    States her surgery will be sometime after 6/7/24 per her request. Wondering if another injection would be helpful. Recommended she follow up with [facility] as she was seen early December 2023. Patient in agreement. Transferred to scheduling to make appointment.    "
Agree with increasing gabapentin and making a follow up appt with me to discuss options  
- - -

## 2024-04-29 NOTE — TELEPHONE ENCOUNTER
Patient is scheduled for surgery on 6/14/24. I gave the patient surgery details, and she verbalized understanding.

## 2024-05-01 ENCOUNTER — OFFICE VISIT (OUTPATIENT)
Dept: PHYSICAL MEDICINE AND REHAB | Facility: CLINIC | Age: 79
End: 2024-05-01
Payer: COMMERCIAL

## 2024-05-01 VITALS — DIASTOLIC BLOOD PRESSURE: 79 MMHG | HEART RATE: 53 BPM | SYSTOLIC BLOOD PRESSURE: 182 MMHG

## 2024-05-01 DIAGNOSIS — M54.12 CERVICAL RADICULOPATHY: Primary | ICD-10-CM

## 2024-05-01 PROCEDURE — 99213 OFFICE O/P EST LOW 20 MIN: CPT | Performed by: NURSE PRACTITIONER

## 2024-05-01 ASSESSMENT — PAIN SCALES - GENERAL: PAINLEVEL: EXTREME PAIN (8)

## 2024-05-01 NOTE — PATIENT INSTRUCTIONS
Massage  Acupuncture   Heat  Lidocaine patches 4% 1-2 at a time apply to areas let sit for 12 hrs. Then take off for 12 hrs.   Voltaren gel 1% over the counter (apply to areas when patches are not in place as directed)     Consider adding robaxin 500mg in the future.    Prescribed Gabapentin today, 300 mg tablets, to be titrated up to 3 tablets 3 times a day as tolerated for your nerve pain. Please follow Gabapentin dosing chart below.    Gabapentin 300mg Dosing Chart    DATE  MORNING AFTERNOON BEDTIME    Day 1 1 2 2    Day 2 1 2 2    Day 3 1 2 2    Day 4 2 2 2    Day 5 2 2 2    Day 6 2 2 2    Day 7 2 2 3    Day 8 2 2 3    Day 9 2 2 3    Day 10 2 3 3    Day 11 2 3 3    Day 12 2 3 3    Day 13 3 3 3    Day 14 3 3 3    Day 15 3 3 3     Continue medication, taking 3 capsules three times daily    Please call if you have any questions regarding how to take your medication  Clinic Phone # [phone]        ~Please call our [facility] Nurse Navigation line [phone] with any questions or concerns about your treatment plan, if symptoms worsen and you would like to be seen urgently, or if you have any new or worsening numbness, weakness, or problems controlling bladder and bowel function.  ~You are also welcome to contact Letha Wei via [employer], but please be aware that responses to [employer] message may take 2-3 days due to the high volume of patients seen in clinic.

## 2024-05-01 NOTE — LETTER
5/1/2024         RE: Jesika Paez  [address]  [place] [address]        Dear Colleague,    Thank you for referring your patient, Jesika Paez, to the [facility]. Please see a copy of my visit note below.    ASSESSMENT: Jesika Paez is a 77 year old female presents for consultation at the request of PCP Abrahan Diez, who presents today for follow-up patient evaluation of :     -Cervical radiculopathy    Jesika has surgery planned in June and is here to discuss pain management options until her surgery. We reviewed medication options including NSAIDs, steroids, muscle relaxers. We reviewed potential additional injections including nicholas TF LAURENCE C5-6. She does not wish to pursue any steroids. She was amenable to increasing gabapentin and will do so per titration schedule I printed for her today. We talked about a muscle relaxer her preference would be to hold off. We discussed and she will try some otc topical medications - voltaren gel and lidocaine patches, heat, massage, acupuncture.She will let us know how she is doing and we can continue to make changes until her surgery.    We did talk about her hypertension which could contribute to Has and consideration of PCP mustapha        9/29/2023     9:24 AM   OSWESTRY DISABILITY INDEX   Count 9   Sum 17   Oswestry Score (%) 37.78 %            Diagnoses and all orders for this visit:  Cervical radiculopathy           PLAN:  Reviewed spine anatomy and disease process. Discussed diagnosis and treatment options with the patient today. A shared decision making model was used. The patient's values and choices were respected. The following represents what was discussed and decided upon by the provider and the patient.     -DIAGNOSTIC TESTS:  Images were personally reviewed and interpreted and explained to patient today using spine model.   -- I did not order any new diagnostic test today    -PHYSICAL THERAPY:     -She will continue physical therapy exercises on her own. Has finished full course  Discussed the importance of core strengthening, ROM, stretching exercises with the patient and how each of these entities is important in decreasing pain.  Explained to the patient that the purpose of physical therapy is to teach the patient a home exercise program.  These exercises need to be performed every day in order to decrease pain and prevent future occurrences of pain.    -MEDICATIONS:    -Slowly increase gabapentin to 900 mg 3 times daily as discussed and outlined in her chart today. Watch for any sedation, dizziness.  -start voltaren gel, lidocaine patches  -discussed muscle relaxers, NSAIDs, po steroids, she would wish to avoid these options this time  -Okay to take Tylenol over-the-counter as well as directed for pain  Discussed multiple medication options today with patient. Discussed risks, side effects, and proper use of medications. Patient verbalized understanding.    -INTERVENTIONS:    Discussed the role of bilateral C5 6 transforaminal cervical epidural steroid injections with patient today. She would like to defer this option today  -She has had C7-T1 interlaminar epidural steroid injection on November 7.    -PATIENT EDUCATION: Total time of 20 minutes, on the day of service, spent with the patient, reviewing the chart, placing orders, and documenting.   -Today we also discussed the issues related to the pros and cons of the current treatment plan.    -FOLLOW-UP:  PRN    Advised patient to call the [facility] if symptoms worsen or if they develop red flag symptoms such as numbness, weakness, severe pain uncontrolled by current pain med regimen, or any new or worsening problems controlling bladder and bowel function.   ______________________________________________________________________    SUBJECTIVE:   Pain is worsening. 8/10 today, 10/10 at worst, 5/10 at best. The injection has been wearing off and the  gabapentin is not helping as much. The pain in her neck is severe and it radiates into both arms. She is not able to sleep due to pain. She has continued to do her home PT exercises on a daily basis. She has been having some frontal headaches. It comes on at the end of the day. She takes 300mg gabapentin 1/1/2tabs.  She is not taking aleve anymore. She is taking tylenol. She denies numbness, weakness, or changes in balance.      Per previous visit  HPI   Jesika Paez  is a 77 year old female history of osteoporosis who presents today for new patient evaluation of cervical radiculopathy    Jesika's symptoms started in June without injury.  She complains of neck pain which is more right-sided, which radiates into her right shoulder and all the way down her arm into her hand.  She also has some left-sided neck pain into her left upper outer arm only, which is not as bad as on the right.  She complains of numbness in her right arm which radiates into her second through fifth digits on her right hand.  Her symptoms improve when she is moving around during the day.  They get worse at night, and when she rests for a long time in 1 position.  She has had episodic shooting sharp pains in her left posterior scalp behind her left ear intermittently for years.  This has not gotten worse with these newer symptoms.    She states she waited for about a month before seeing anyone about these symptoms to see if they would go away on their own, but they have not.  She saw her primary care in July regarding this pain and again in September.  She states there was discussion of physical therapy, but patient ultimately decided to do exercises on her own.  In September, with persistent symptoms, she was ultimately referred for spine clinic evaluation.    Her pain today is a 7 out of 10, 10 out of 10 at worst, 5 out of 10 at best.  She has tried doing daily stretching exercises and exercises with weights without relief and the pain  has gotten worse to where she needed to take 1 Aleve 2 evenings this week.  She has not tried any other over-the-counter or topical medications.  She has tried a massage pad, which helps the neck pain but does not improve her arm symptoms.    She denies arm weakness, imbalance, changes in bowel or bladder control, leg symptoms.    She has not done any physical therapy, chiropractic care, injections, previous cervical spine surgeries    She has had lumbar surgery.    -Treatment to Date:     -Medications:  -Aleve  -Gabapentin  -Tizanidine    Current Outpatient Medications   Medication Sig Dispense Refill     Ascorbic Acid (VITAMIN C) 500 MG CAPS        atorvastatin (LIPITOR) 20 MG tablet Take 1 tablet (20 mg) by mouth daily 90 tablet 3     cholecalciferol, vitamin D3, 2,000 unit Tab [CHOLECALCIFEROL, VITAMIN D3, 2,000 UNIT TAB] 2 tabs a day 100 each 3     cyanocobalamin (VITAMIN B-12) 500 MCG tablet Take 500 mcg by mouth once a week       gabapentin (NEURONTIN) 300 MG capsule TAKE ONE CAPSULE BY MOUTH TWICE A  capsule 0     lactobacillus rhamnosus, GG, (CULTURELL) capsule Take 1 capsule by mouth 2 times daily K Sharmila       Protein POWD Uses a whey blend       UNABLE TO FIND MEDICATION NAME: Primrose supplement       naproxen sodium (ALEVE) 220 MG capsule Take 220 mg by mouth 2 times daily (with meals)       UNABLE TO FIND MEDICATION NAME: Shawanda       No current facility-administered medications for this visit.       Allergies   Allergen Reactions     Codeine Sulfate [Codeine] Unknown       No past medical history on file.     Patient Active Problem List   Diagnosis     Hyperlipemia     Hearing Loss     Osteopenia     Macular degeneration (senile) of retina     Osteoporosis, unspecified osteoporosis type, unspecified pathological fracture presence     Cervical radiculopathy       Past Surgical History:   Procedure Laterality Date     ANKLE FRACTURE SURGERY       CHOLECYSTECTOMY       FRACTURE SURGERY        HC OPEN TX BIMALLEOLAR ANKLE FX, INCL INTERNAL FIXATION      Description: Open Treatment Of Bimalleolar Ankle Fracture;  Recorded: 01/12/2010;     HC REMOVAL GALLBLADDER      Description: Cholecystectomy;  Recorded: 01/12/2010;     HC REMOVE TONSILS/ADENOIDS,<11 Y/O      Description: Tonsillectomy With Adenoidectomy;  Recorded: 01/12/2010;     HYSTERECTOMY       ZZC ARVIZU W/O FACETEC FORAMOT/DSKC 1/2 VRT SEG, LUMBAR      Description: Laminectomy Decompressive Up To Two Lumbar Segments;  Recorded: 01/12/2010;  Comments: X2 SEPARATE BACK SURGERIES     Z LIGATE FALLOPIAN TUBE      Description: Tubal Ligation;  Recorded: 01/12/2010;     ZZC TOTAL ABDOM HYSTERECTOMY      Description: Total Abdominal Hysterectomy;  Recorded: 01/12/2010;     ZZC TOTAL ABDOM HYSTERECTOMY      Description: Hysterectomy;  Recorded: 01/12/2010;       Family History   Problem Relation Age of Onset     Dementia Mother      Hearing Loss Mother      Heart Disease Father      Hyperlipidemia Father      Hypertension Father      Seizure Disorder Sister      Pneumonia Brother      Arthritis Brother      Chronic Infections Brother      Suicidality Son        Reviewed past medical, surgical, and family history with patient found on new patient intake packet located in [employer] tab.     SOCIAL HX: Non-smoker, no alcohol use, previous heavy drinking, positive recreational drug use    Oswestry (JULIANA) Questionnaire:        9/29/2023     9:24 AM   OSWESTRY DISABILITY INDEX   Count 9   Sum 17   Oswestry Score (%) 37.78 %       Neck Disability Index:      4/25/2024     8:00 AM   Neck Disability Index (  Reynold H. [employer] Carlos Enrique [employer]. 1991. All rights reserved.; used with permission)   SECTION 1 - PAIN INTENSITY 4   SECTION 2 - PERSONAL CARE 2   SECTION 3 - LIFTING 2   SECTION 4 - READING 3   SECTION 5 - HEADACHES 3   SECTION 6 - CONCENTRATION 3   SECTION 7 - WORK 3   SECTION 8 - DRIVING 5   SECTION 9 - SLEEPING 5   SECTION 10 - RECREATION 5   Count 10   Sum 35   Raw  Score: /50 35   Neck Disability Index Score: (%) 70 %          PHQ-2 Score:       4/25/2024     8:31 AM 2/10/2023     8:08 AM   PHQ-2 ( 1999 [employer])   Q1: Little interest or pleasure in doing things 0 0   Q2: Feeling down, depressed or hopeless 1 0   PHQ-2 Score 1 0   Q1: Little interest or pleasure in doing things  Not at all   Q2: Feeling down, depressed or hopeless  Not at all   PHQ-2 Score  0         OBJECTIVE:  BP (!) 182/79   Pulse 53     PHYSICAL EXAMINATION:  --CONSTITUTIONAL: Vital signs as above. No acute distress. The patient is well nourished and well groomed.  --PSYCHIATRIC: The patient is awake, alert, oriented to person, place, and time, and answering questions appropriately with clear speech. Appropriate mood and affect   --HEENT: Sclera are non-injected. Extraocular muscles are intact. Moist oral mucosa.  --SKIN: Skin over the face, bilateral upper extremities, and posterior torso is clean, dry, intact without rashes.    --RESPIRATORY: Normal rhythm and effort. No abnormal accessory muscle breathing patterns noted.     --GROSS MOTOR: Easily arises from a seated position. Did not test gait but was able to walk to the exam table without obvious difficulty    --UPPER EXTREMITY MOTOR TESTING:  Shoulder abduction: right 5/5, left 5/5  Triceps: right 5/5 left 5/5  Biceps:right 5/5  left 5/5,   Hand :  right 5/5  left 5/5,   Intrinsics: right 5/5  left 5/5,   Extensors: right 5/5  left 5/5,     --LOWER EXTREMITY MOTOR TESTING  Hip flexion: right 5/5  left 5/5,   Quads:right 5/5  left 5/5,   Hamstrings: right 5/5  left 5/5,   Dorsiflexion: right 5/5  left 5/5,   Plantarflexion: right 5/5  left 5/5,   EHL: right 5/5  left 5/5,     REFLEXES: 2/4 symmetric triceps, biceps, brachioradialis bilaterally. 2/4 symmetric patellar, achilles reflex bilaterally.  Negative Clonus  Negative thompson nicholas    SENSATION: of the upper and lower extremities is intact to light touch.   --VASCULAR: Warm upper and lower limbs  bilaterally.     --CERVICAL SPINE: Inspection reveals no evidence of deformity or swelling. Range of motion is not limited in cervical flexion, extension, lateral rotation, head tilt. No point tenderness to palpation of cervical spine.  Bilateral trap tenderness to palpation.  Some mid to lower cervical point tenderness to palp. Alexey lower cervical paraspinal musculature.        RESULTS:   Prior medical records from [facility] Everywhere were reviewed today.         IMAGING:  Personally reviewed images and report, and shown to patient and son at time of visit today.  Narrative & Impression   EXAM: MR CERVICAL SPINE W/O CONTRAST  LOCATION: [facility]  DATE: 10/7/2023     INDICATION: Neck pain with radiculopathy  COMPARISON: None available at time of dictation.  TECHNIQUE: MRI Cervical Spine without IV contrast.     FINDINGS: The spine is imaged from skull base through T3. Straightening of the usual spinal curvature with 1 mm retrolisthesis of C3 over C4. No evidence of acute fracture. No suspicious marrow signal alteration. The visualized posterior fossa and   brainstem are unremarkable. There is flattening/indentation spinal cord without intrinsic signal alteration at C5-C6 level. The paravertebral structures are unremarkable. Maintained vascular flow voids.     Craniovertebral junction and C1-C2: Widely patent.     C2-C3: Normal disc height, mild uncovertebral degenerative change, mild facet arthropathy without significant spinal canal stenosis or neural foraminal narrowing.     C3-C4: 1 mm retrolisthesis, mild to moderate disc height loss with symmetric disc bulge, mild to moderate uncovertebral degenerative change, mild to moderate facet arthropathy results in minimal spinal canal stenosis and mild bilateral neural foraminal   narrowing.     C4-C5: Mild to moderate disc height loss with symmetric disc bulge, mild to moderate uncovertebral degenerative change, mild to  moderate facet arthropathy without significant spinal canal stenosis or neural foraminal narrowing.     C5-C6: Moderate severe disc height loss with symmetric disc bulge and osteophytes in combination with severe uncovertebral degenerative change and moderate severe facet arthropathy results in moderate spinal canal stenosis and advanced bilateral neural   foraminal narrowing.     C6-C7: Mild to moderate disc height loss, mild to moderate uncovertebral degenerative change, mild to moderate facet arthropathy results in mild left neural foraminal narrowing without significant spinal canal stenosis.     C7-T1: Normal disc height and mild facet arthropathy without significant spinal canal stenosis or neural foraminal narrowing.                                                                      IMPRESSION:     1.  Multilevel degenerative change of the cervical spine as described, most prominent at C5-C6 where there is moderate spinal canal stenosis, flattening/indentation of the spinal cord without intrinsic signal alteration, and advanced bilateral neural   foraminal narrowing            Narrative & Impression   EXAM: XR CERVICAL SPINE FLEX/EXT 2/3 VIEWS  LOCATION: [facility]  DATE: 10/17/2023     INDICATION: flexion extension neutral xr  COMPARISON: None.                                                                      IMPRESSION: Straightening of cervical lordosis in neutral position. At C3-C4, retrolisthesis measures 2 mm in extension only. At C4-C5, retrolisthesis measures 2 mm in extension only. No additional fixed listhesis or dynamic subluxation. Vertebral body   heights are maintained. No aggressive sclerotic or lytic osseous lesion.                Letha Wei FNP-C  [facility]  O. [phone]      Again, thank you for allowing me to participate in the care of your patient.        Sincerely,        Letha Wei, HILL CNP

## 2024-05-01 NOTE — PROGRESS NOTES
ASSESSMENT: Jesika Paez is a 77 year old female presents for consultation at the request of PCP Abrahan Diez, who presents today for follow-up patient evaluation of :     -Cervical radiculopathy    Jesika has surgery planned in June and is here to discuss pain management options until her surgery. We reviewed medication options including NSAIDs, steroids, muscle relaxers. We reviewed potential additional injections including nicholas TF LAURENCE C5-6. She does not wish to pursue any steroids. She was amenable to increasing gabapentin and will do so per titration schedule I printed for her today. We talked about a muscle relaxer her preference would be to hold off. We discussed and she will try some otc topical medications - voltaren gel and lidocaine patches, heat, massage, acupuncture.She will let us know how she is doing and we can continue to make changes until her surgery.    We did talk about her hypertension which could contribute to Has and consideration of PCP mustapha        9/29/2023     9:24 AM   OSWESTRY DISABILITY INDEX   Count 9   Sum 17   Oswestry Score (%) 37.78 %            Diagnoses and all orders for this visit:  Cervical radiculopathy           PLAN:  Reviewed spine anatomy and disease process. Discussed diagnosis and treatment options with the patient today. A shared decision making model was used. The patient's values and choices were respected. The following represents what was discussed and decided upon by the provider and the patient.     -DIAGNOSTIC TESTS:  Images were personally reviewed and interpreted and explained to patient today using spine model.   -- I did not order any new diagnostic test today    -PHYSICAL THERAPY:    -She will continue physical therapy exercises on her own. Has finished full course  Discussed the importance of core strengthening, ROM, stretching exercises with the patient and how each of these entities is important in decreasing pain.  Explained to the patient  that the purpose of physical therapy is to teach the patient a home exercise program.  These exercises need to be performed every day in order to decrease pain and prevent future occurrences of pain.    -MEDICATIONS:    -Slowly increase gabapentin to 900 mg 3 times daily as discussed and outlined in her chart today. Watch for any sedation, dizziness.  -start voltaren gel, lidocaine patches  -discussed muscle relaxers, NSAIDs, po steroids, she would wish to avoid these options this time  -Okay to take Tylenol over-the-counter as well as directed for pain  Discussed multiple medication options today with patient. Discussed risks, side effects, and proper use of medications. Patient verbalized understanding.    -INTERVENTIONS:    Discussed the role of bilateral C5 6 transforaminal cervical epidural steroid injections with patient today. She would like to defer this option today  -She has had C7-T1 interlaminar epidural steroid injection on November 7.    -PATIENT EDUCATION: Total time of 20 minutes, on the day of service, spent with the patient, reviewing the chart, placing orders, and documenting.   -Today we also discussed the issues related to the pros and cons of the current treatment plan.    -FOLLOW-UP:  PRN    Advised patient to call the [facility] if symptoms worsen or if they develop red flag symptoms such as numbness, weakness, severe pain uncontrolled by current pain med regimen, or any new or worsening problems controlling bladder and bowel function.   ______________________________________________________________________    SUBJECTIVE:   Pain is worsening. 8/10 today, 10/10 at worst, 5/10 at best. The injection has been wearing off and the gabapentin is not helping as much. The pain in her neck is severe and it radiates into both arms. She is not able to sleep due to pain. She has continued to do her home PT exercises on a daily basis. She has been having some frontal headaches. It comes on at the end of  the day. She takes 300mg gabapentin 1/1/2tabs.  She is not taking aleve anymore. She is taking tylenol. She denies numbness, weakness, or changes in balance.      Per previous visit  HPI   Jesika Paez  is a 77 year old female history of osteoporosis who presents today for new patient evaluation of cervical radiculopathy    Jesika's symptoms started in June without injury.  She complains of neck pain which is more right-sided, which radiates into her right shoulder and all the way down her arm into her hand.  She also has some left-sided neck pain into her left upper outer arm only, which is not as bad as on the right.  She complains of numbness in her right arm which radiates into her second through fifth digits on her right hand.  Her symptoms improve when she is moving around during the day.  They get worse at night, and when she rests for a long time in 1 position.  She has had episodic shooting sharp pains in her left posterior scalp behind her left ear intermittently for years.  This has not gotten worse with these newer symptoms.    She states she waited for about a month before seeing anyone about these symptoms to see if they would go away on their own, but they have not.  She saw her primary care in July regarding this pain and again in September.  She states there was discussion of physical therapy, but patient ultimately decided to do exercises on her own.  In September, with persistent symptoms, she was ultimately referred for spine clinic evaluation.    Her pain today is a 7 out of 10, 10 out of 10 at worst, 5 out of 10 at best.  She has tried doing daily stretching exercises and exercises with weights without relief and the pain has gotten worse to where she needed to take 1 Aleve 2 evenings this week.  She has not tried any other over-the-counter or topical medications.  She has tried a massage pad, which helps the neck pain but does not improve her arm symptoms.    She denies arm weakness,  imbalance, changes in bowel or bladder control, leg symptoms.    She has not done any physical therapy, chiropractic care, injections, previous cervical spine surgeries    She has had lumbar surgery.    -Treatment to Date:     -Medications:  -Aleve  -Gabapentin  -Tizanidine    Current Outpatient Medications   Medication Sig Dispense Refill    Ascorbic Acid (VITAMIN C) 500 MG CAPS       atorvastatin (LIPITOR) 20 MG tablet Take 1 tablet (20 mg) by mouth daily 90 tablet 3    cholecalciferol, vitamin D3, 2,000 unit Tab [CHOLECALCIFEROL, VITAMIN D3, 2,000 UNIT TAB] 2 tabs a day 100 each 3    cyanocobalamin (VITAMIN B-12) 500 MCG tablet Take 500 mcg by mouth once a week      gabapentin (NEURONTIN) 300 MG capsule TAKE ONE CAPSULE BY MOUTH TWICE A  capsule 0    lactobacillus rhamnosus, GG, (CULTURELL) capsule Take 1 capsule by mouth 2 times daily K Sharmila      Protein POWD Uses a whey blend      UNABLE TO FIND MEDICATION NAME: Primrose supplement      naproxen sodium (ALEVE) 220 MG capsule Take 220 mg by mouth 2 times daily (with meals)      UNABLE TO FIND MEDICATION NAME: Shawanda       No current facility-administered medications for this visit.       Allergies   Allergen Reactions    Codeine Sulfate [Codeine] Unknown       No past medical history on file.     Patient Active Problem List   Diagnosis    Hyperlipemia    Hearing Loss    Osteopenia    Macular degeneration (senile) of retina    Osteoporosis, unspecified osteoporosis type, unspecified pathological fracture presence    Cervical radiculopathy       Past Surgical History:   Procedure Laterality Date    ANKLE FRACTURE SURGERY      CHOLECYSTECTOMY      FRACTURE SURGERY      HC OPEN TX BIMALLEOLAR ANKLE FX, INCL INTERNAL FIXATION      Description: Open Treatment Of Bimalleolar Ankle Fracture;  Recorded: 01/12/2010;    HC REMOVAL GALLBLADDER      Description: Cholecystectomy;  Recorded: 01/12/2010;    HC REMOVE TONSILS/ADENOIDS,<13 Y/O      Description:  Tonsillectomy With Adenoidectomy;  Recorded: 01/12/2010;    HYSTERECTOMY      ZZC ARVIZU W/O FACETEC FORAMOT/DSKC 1/2 VRT SEG, LUMBAR      Description: Laminectomy Decompressive Up To Two Lumbar Segments;  Recorded: 01/12/2010;  Comments: X2 SEPARATE BACK SURGERIES    ZZC LIGATE FALLOPIAN TUBE      Description: Tubal Ligation;  Recorded: 01/12/2010;    ZZC TOTAL ABDOM HYSTERECTOMY      Description: Total Abdominal Hysterectomy;  Recorded: 01/12/2010;    ZZC TOTAL ABDOM HYSTERECTOMY      Description: Hysterectomy;  Recorded: 01/12/2010;       Family History   Problem Relation Age of Onset    Dementia Mother     Hearing Loss Mother     Heart Disease Father     Hyperlipidemia Father     Hypertension Father     Seizure Disorder Sister     Pneumonia Brother     Arthritis Brother     Chronic Infections Brother     Suicidality Son        Reviewed past medical, surgical, and family history with patient found on new patient intake packet located in EMR [employer] tab.     SOCIAL HX: Non-smoker, no alcohol use, previous heavy drinking, positive recreational drug use    Oswestry (JULIANA) Questionnaire:        9/29/2023     9:24 AM   OSWESTRY DISABILITY INDEX   Count 9   Sum 17   Oswestry Score (%) 37.78 %       Neck Disability Index:      4/25/2024     8:00 AM   Neck Disability Index (  Reynold H. [employer] Carlos Enrique [employer]. 1991. All rights reserved.; used with permission)   SECTION 1 - PAIN INTENSITY 4   SECTION 2 - PERSONAL CARE 2   SECTION 3 - LIFTING 2   SECTION 4 - READING 3   SECTION 5 - HEADACHES 3   SECTION 6 - CONCENTRATION 3   SECTION 7 - WORK 3   SECTION 8 - DRIVING 5   SECTION 9 - SLEEPING 5   SECTION 10 - RECREATION 5   Count 10   Sum 35   Raw Score: /50 35   Neck Disability Index Score: (%) 70 %          PHQ-2 Score:       4/25/2024     8:31 AM 2/10/2023     8:08 AM   PHQ-2 ( 1999 [employer])   Q1: Little interest or pleasure in doing things 0 0   Q2: Feeling down, depressed or hopeless 1 0   PHQ-2 Score 1 0   Q1: Little interest or pleasure  in doing things  Not at all   Q2: Feeling down, depressed or hopeless  Not at all   PHQ-2 Score  0         OBJECTIVE:  BP (!) 182/79   Pulse 53     PHYSICAL EXAMINATION:  --CONSTITUTIONAL: Vital signs as above. No acute distress. The patient is well nourished and well groomed.  --PSYCHIATRIC: The patient is awake, alert, oriented to person, place, and time, and answering questions appropriately with clear speech. Appropriate mood and affect   --HEENT: Sclera are non-injected. Extraocular muscles are intact. Moist oral mucosa.  --SKIN: Skin over the face, bilateral upper extremities, and posterior torso is clean, dry, intact without rashes.    --RESPIRATORY: Normal rhythm and effort. No abnormal accessory muscle breathing patterns noted.     --GROSS MOTOR: Easily arises from a seated position. Did not test gait but was able to walk to the exam table without obvious difficulty    --UPPER EXTREMITY MOTOR TESTING:  Shoulder abduction: right 5/5, left 5/5  Triceps: right 5/5 left 5/5  Biceps:right 5/5  left 5/5,   Hand :  right 5/5  left 5/5,   Intrinsics: right 5/5  left 5/5,   Extensors: right 5/5  left 5/5,     --LOWER EXTREMITY MOTOR TESTING  Hip flexion: right 5/5  left 5/5,   Quads:right 5/5  left 5/5,   Hamstrings: right 5/5  left 5/5,   Dorsiflexion: right 5/5  left 5/5,   Plantarflexion: right 5/5  left 5/5,   EHL: right 5/5  left 5/5,     REFLEXES: 2/4 symmetric triceps, biceps, brachioradialis bilaterally. 2/4 symmetric patellar, achilles reflex bilaterally.  Negative Clonus  Negative thompson nicholas    SENSATION: of the upper and lower extremities is intact to light touch.   --VASCULAR: Warm upper and lower limbs bilaterally.     --CERVICAL SPINE: Inspection reveals no evidence of deformity or swelling. Range of motion is not limited in cervical flexion, extension, lateral rotation, head tilt. No point tenderness to palpation of cervical spine.  Bilateral trap tenderness to palpation.  Some mid to lower  cervical point tenderness to palp. Alexey lower cervical paraspinal musculature.        RESULTS:   Prior medical records from [facility] and Care Everywhere were reviewed today.         IMAGING:  Personally reviewed images and report, and shown to patient and son at time of visit today.  Narrative & Impression   EXAM: MR CERVICAL SPINE W/O CONTRAST  LOCATION: [facility]  DATE: 10/7/2023     INDICATION: Neck pain with radiculopathy  COMPARISON: None available at time of dictation.  TECHNIQUE: MRI Cervical Spine without IV contrast.     FINDINGS: The spine is imaged from skull base through T3. Straightening of the usual spinal curvature with 1 mm retrolisthesis of C3 over C4. No evidence of acute fracture. No suspicious marrow signal alteration. The visualized posterior fossa and   brainstem are unremarkable. There is flattening/indentation spinal cord without intrinsic signal alteration at C5-C6 level. The paravertebral structures are unremarkable. Maintained vascular flow voids.     Craniovertebral junction and C1-C2: Widely patent.     C2-C3: Normal disc height, mild uncovertebral degenerative change, mild facet arthropathy without significant spinal canal stenosis or neural foraminal narrowing.     C3-C4: 1 mm retrolisthesis, mild to moderate disc height loss with symmetric disc bulge, mild to moderate uncovertebral degenerative change, mild to moderate facet arthropathy results in minimal spinal canal stenosis and mild bilateral neural foraminal   narrowing.     C4-C5: Mild to moderate disc height loss with symmetric disc bulge, mild to moderate uncovertebral degenerative change, mild to moderate facet arthropathy without significant spinal canal stenosis or neural foraminal narrowing.     C5-C6: Moderate severe disc height loss with symmetric disc bulge and osteophytes in combination with severe uncovertebral degenerative change and moderate severe facet arthropathy results in  moderate spinal canal stenosis and advanced bilateral neural   foraminal narrowing.     C6-C7: Mild to moderate disc height loss, mild to moderate uncovertebral degenerative change, mild to moderate facet arthropathy results in mild left neural foraminal narrowing without significant spinal canal stenosis.     C7-T1: Normal disc height and mild facet arthropathy without significant spinal canal stenosis or neural foraminal narrowing.                                                                      IMPRESSION:     1.  Multilevel degenerative change of the cervical spine as described, most prominent at C5-C6 where there is moderate spinal canal stenosis, flattening/indentation of the spinal cord without intrinsic signal alteration, and advanced bilateral neural   foraminal narrowing            Narrative & Impression   EXAM: XR CERVICAL SPINE FLEX/EXT 2/3 VIEWS  LOCATION: [facility]  DATE: 10/17/2023     INDICATION: flexion extension neutral xr  COMPARISON: None.                                                                      IMPRESSION: Straightening of cervical lordosis in neutral position. At C3-C4, retrolisthesis measures 2 mm in extension only. At C4-C5, retrolisthesis measures 2 mm in extension only. No additional fixed listhesis or dynamic subluxation. Vertebral body   heights are maintained. No aggressive sclerotic or lytic osseous lesion.                Letha Wei FNP-C  [facility]  O. [phone]

## 2024-05-09 ENCOUNTER — MYC MEDICAL ADVICE (OUTPATIENT)
Dept: PHYSICAL MEDICINE AND REHAB | Facility: CLINIC | Age: 79
End: 2024-05-09
Payer: COMMERCIAL

## 2024-05-09 DIAGNOSIS — M54.12 CERVICAL RADICULOPATHY: Primary | ICD-10-CM

## 2024-05-09 RX ORDER — MELOXICAM 7.5 MG/1
TABLET ORAL
Qty: 60 TABLET | Refills: 0 | Status: SHIPPED | OUTPATIENT
Start: 2024-05-09 | End: 2024-06-20

## 2024-05-09 RX ORDER — CYCLOBENZAPRINE HCL 5 MG
TABLET ORAL
Qty: 45 TABLET | Refills: 1 | Status: SHIPPED | OUTPATIENT
Start: 2024-05-09 | End: 2024-06-20

## 2024-05-09 NOTE — TELEPHONE ENCOUNTER
We had talked about adding a po NSAID and muscle relaxer. Preference last appt was to hold off but I would recommend adding one of each type of medication at this point. If amenable: would recommend flexeril 5mg-10mg TID and meloxicam 7.5-15mg daily. Monitor bp at home w/ NSAID given htn last appt. Please note, typically will have to stop NSAID several days prior to surgery

## 2024-05-09 NOTE — TELEPHONE ENCOUNTER
Phone call to patient to discuss PSP's recommendations for adding Meloxicam and Flexeril at this time to help with pain management leading up to surgery. She would like the mediations to be sent to the [facility]. She inquires about the spacing of the Gabapentin. Explained the maximum amount of capsules she should be taking in a 24 hour period is 9. If she finds relief with 8 capsules evenly spaced through the day (2 capsules every 6 hours), she may continue at that dose. Reviewed common side effects as well. Discussed monitoring her blood pressure while on NSAID as well as needing to stop this medication usually 10 days prior to surgery. Stated understanding.

## 2024-05-15 ENCOUNTER — MYC REFILL (OUTPATIENT)
Dept: PHYSICAL MEDICINE AND REHAB | Facility: CLINIC | Age: 79
End: 2024-05-15
Payer: COMMERCIAL

## 2024-05-15 DIAGNOSIS — M54.12 CERVICAL RADICULOPATHY: ICD-10-CM

## 2024-05-15 DIAGNOSIS — M54.2 CERVICALGIA: ICD-10-CM

## 2024-05-15 NOTE — TELEPHONE ENCOUNTER
Last appointment: 5/1/24  Next appointment: none    Notes/Comments: last ordered 4/15/24 #180 with no refills      Rx request(s) has been reviewed.

## 2024-05-16 ENCOUNTER — MYC REFILL (OUTPATIENT)
Dept: PHYSICAL MEDICINE AND REHAB | Facility: CLINIC | Age: 79
End: 2024-05-16
Payer: COMMERCIAL

## 2024-05-16 DIAGNOSIS — M54.2 CERVICALGIA: ICD-10-CM

## 2024-05-16 DIAGNOSIS — M54.12 CERVICAL RADICULOPATHY: ICD-10-CM

## 2024-05-16 RX ORDER — GABAPENTIN 300 MG/1
CAPSULE ORAL
Qty: 270 CAPSULE | Refills: 1 | Status: SHIPPED | OUTPATIENT
Start: 2024-05-16

## 2024-05-16 RX ORDER — GABAPENTIN 300 MG/1
300 CAPSULE ORAL 2 TIMES DAILY
Qty: 180 CAPSULE | Refills: 0 | Status: SHIPPED | OUTPATIENT
Start: 2024-05-16 | End: 2024-05-16

## 2024-05-20 ENCOUNTER — OFFICE VISIT (OUTPATIENT)
Dept: FAMILY MEDICINE | Facility: CLINIC | Age: 79
End: 2024-05-20
Payer: COMMERCIAL

## 2024-05-20 VITALS
SYSTOLIC BLOOD PRESSURE: 146 MMHG | RESPIRATION RATE: 18 BRPM | OXYGEN SATURATION: 93 % | HEART RATE: 57 BPM | HEIGHT: 63 IN | TEMPERATURE: 98 F | BODY MASS INDEX: 27.64 KG/M2 | WEIGHT: 156 LBS | DIASTOLIC BLOOD PRESSURE: 78 MMHG

## 2024-05-20 DIAGNOSIS — R22.42 LOCALIZED SWELLING OF LEFT FOOT: ICD-10-CM

## 2024-05-20 DIAGNOSIS — E78.5 HYPERLIPIDEMIA, UNSPECIFIED HYPERLIPIDEMIA TYPE: ICD-10-CM

## 2024-05-20 DIAGNOSIS — Z01.818 PREOP GENERAL PHYSICAL EXAM: Primary | ICD-10-CM

## 2024-05-20 DIAGNOSIS — M48.02 SPINAL STENOSIS IN CERVICAL REGION: ICD-10-CM

## 2024-05-20 DIAGNOSIS — M54.12 CERVICAL RADICULOPATHY: ICD-10-CM

## 2024-05-20 DIAGNOSIS — M81.0 OSTEOPOROSIS, UNSPECIFIED OSTEOPOROSIS TYPE, UNSPECIFIED PATHOLOGICAL FRACTURE PRESENCE: ICD-10-CM

## 2024-05-20 LAB
ANION GAP SERPL CALCULATED.3IONS-SCNC: 9 MMOL/L (ref 7–15)
ATRIAL RATE - MUSE: 53 BPM
BUN SERPL-MCNC: 9.3 MG/DL (ref 8–23)
CALCIUM SERPL-MCNC: 9 MG/DL (ref 8.8–10.2)
CHLORIDE SERPL-SCNC: 105 MMOL/L (ref 98–107)
CREAT SERPL-MCNC: 0.73 MG/DL (ref 0.51–0.95)
DEPRECATED HCO3 PLAS-SCNC: 27 MMOL/L (ref 22–29)
DIASTOLIC BLOOD PRESSURE - MUSE: NORMAL MMHG
EGFRCR SERPLBLD CKD-EPI 2021: 84 ML/MIN/1.73M2
ERYTHROCYTE [DISTWIDTH] IN BLOOD BY AUTOMATED COUNT: 12.4 % (ref 10–15)
GLUCOSE SERPL-MCNC: 100 MG/DL (ref 70–99)
HCT VFR BLD AUTO: 38.7 % (ref 35–47)
HGB BLD-MCNC: 13 G/DL (ref 11.7–15.7)
INTERPRETATION ECG - MUSE: NORMAL
MCH RBC QN AUTO: 32.5 PG (ref 26.5–33)
MCHC RBC AUTO-ENTMCNC: 33.6 G/DL (ref 31.5–36.5)
MCV RBC AUTO: 97 FL (ref 78–100)
P AXIS - MUSE: 27 DEGREES
PLATELET # BLD AUTO: 165 10E3/UL (ref 150–450)
POTASSIUM SERPL-SCNC: 4.9 MMOL/L (ref 3.4–5.3)
PR INTERVAL - MUSE: 176 MS
QRS DURATION - MUSE: 88 MS
QT - MUSE: 440 MS
QTC - MUSE: 412 MS
R AXIS - MUSE: 17 DEGREES
RBC # BLD AUTO: 4 10E6/UL (ref 3.8–5.2)
SODIUM SERPL-SCNC: 141 MMOL/L (ref 135–145)
SYSTOLIC BLOOD PRESSURE - MUSE: NORMAL MMHG
T AXIS - MUSE: 15 DEGREES
VENTRICULAR RATE- MUSE: 53 BPM
WBC # BLD AUTO: 5.7 10E3/UL (ref 4–11)

## 2024-05-20 PROCEDURE — 85027 COMPLETE CBC AUTOMATED: CPT | Performed by: FAMILY MEDICINE

## 2024-05-20 PROCEDURE — 80048 BASIC METABOLIC PNL TOTAL CA: CPT | Performed by: FAMILY MEDICINE

## 2024-05-20 PROCEDURE — 93005 ELECTROCARDIOGRAM TRACING: CPT | Performed by: FAMILY MEDICINE

## 2024-05-20 PROCEDURE — 99214 OFFICE O/P EST MOD 30 MIN: CPT | Performed by: FAMILY MEDICINE

## 2024-05-20 PROCEDURE — 93010 ELECTROCARDIOGRAM REPORT: CPT | Performed by: STUDENT IN AN ORGANIZED HEALTH CARE EDUCATION/TRAINING PROGRAM

## 2024-05-20 PROCEDURE — 91320 SARSCV2 VAC 30MCG TRS-SUC IM: CPT | Performed by: FAMILY MEDICINE

## 2024-05-20 PROCEDURE — 36415 COLL VENOUS BLD VENIPUNCTURE: CPT | Performed by: FAMILY MEDICINE

## 2024-05-20 PROCEDURE — 90480 ADMN SARSCOV2 VAC 1/ONLY CMP: CPT | Performed by: FAMILY MEDICINE

## 2024-05-20 ASSESSMENT — PAIN SCALES - GENERAL: PAINLEVEL: MODERATE PAIN (4)

## 2024-05-20 NOTE — PROGRESS NOTES
Preoperative Evaluation  [facility]  [address]  [facility] [address]  Phone: [phone]  Fax: [phone]  Primary Provider: Abrahan Diez MD, MD  Pre-op Performing Provider: Abrahan Diez MD, MD  May 20, 2024       Surgical Information  Surgery/Procedure: Right open door laminoplasty at cervical 5-cervical 6 with bilateral foraminotomies   Surgery Location:   Surgeon: Dr Fernandez  Surgery Date: 6/14/24  Time of Surgery: 11:35 am  Where patient plans to recover: At home with family  Fax number for surgical facility: Note does not need to be faxed, will be available electronically in Epic.    Assessment & Plan     The proposed surgical procedure is considered INTERMEDIATE risk.    Jesika was seen today for recheck medication, pre-op exam and hypertension.    Diagnoses and all orders for this visit:    Preop general physical exam  -     Basic metabolic panel  (Ca, Cl, CO2, Creat, Gluc, K, Na, BUN); Future  -     CBC with platelets; Future  -     EKG 12-lead, tracing only  -     Basic metabolic panel  (Ca, Cl, CO2, Creat, Gluc, K, Na, BUN)  -     CBC with platelets    Spinal stenosis in cervical region    Cervical radiculopathy    Hyperlipidemia, unspecified hyperlipidemia type    Osteoporosis, unspecified osteoporosis type, unspecified pathological fracture presence       Possible Sleep Apnea: snoring, no apnea, occasional fatigue more likely due to gabapentin.        - No identified additional risk factors other than previously addressed    Antiplatelet or Anticoagulation Medication Instructions   - Patient is on no antiplatelet or anticoagulation medications.    Additional Medication Instructions  Do not take atorvastatin the day of the procedure only.    Recommendation  Approval given to proceed with proposed procedure, without further diagnostic evaluation.        Subjective   Jesika is a 78 year old, presenting for the following:  Recheck Medication, Pre-Op Exam,  and Hypertension    She has neck pain radiating into the upper extremities.  She has an MRI scan showing degenerative changes and cervical spine stenosis.  She has exhausted conservative means for treatment and is now scheduled for surgical intervention.    She has osteoporosis.  She has been followed by endocrinology.  She had previously been on alendronate.     She has hyperlipidemia she is on a statin she does not have any symptoms of vascular disease.  She is a former smoker.    She does report left leg swelling that has been occurring over the past 24 hours.  No redness does report discomfort in the ankle joint specifically.  Will rule out blood clot before proceeding with clearance.  Attempted to schedule an ultrasound same day patient declined stating transportation issues agrees to proceed to emergency department for immediate evaluation should there be development of pain redness swelling chest pain or shortness of breath.  The follow-up ultrasound resolved complete clearance pending this test result no other specific issues are identified.  Discussed preoperative instructions today.    Addendum the 23rd 2024: Additional labs have returned and are normal.  Ultrasound of the left lower extremity shows no evidence of DVT.  I recommend proceeding with the procedure as planned without any need for further testing.  There is no identified concern that would confer a significant risk to this patient to have the procedure as planned.        5/20/2024    11:06 AM   Additional Questions   Roomed by am cma         5/20/2024   Forms   Any forms needing to be completed Yes    Yes     HPI related to upcoming procedure:         5/13/2024   Pre-Op Questionnaire   Have you ever had a heart attack or stroke? No   Have you ever had surgery on your heart or blood vessels, such as a stent placement, a coronary artery bypass, or surgery on an artery in your head, neck, heart, or legs? No   Do you have chest pain with activity?  No   Do you have a history of heart failure? No   Do you currently have a cold, bronchitis or symptoms of other infection? No   Do you have a cough, shortness of breath, or wheezing? No   Do you or anyone in your family have previous history of blood clots? UNKNOWN - no personal history, no known family history   Do you or does anyone in your family have a serious bleeding problem such as prolonged bleeding following surgeries or cuts? UNKNOWN - No personal history no known family history   Have you ever had problems with anemia or been told to take iron pills? No   Have you had any abnormal blood loss such as black, tarry or bloody stools, or abnormal vaginal bleeding? No   Have you ever had a blood transfusion? No   Are you willing to have a blood transfusion if it is medically needed before, during, or after your surgery? Yes   Have you or any of your relatives ever had problems with anesthesia? No   Do you have sleep apnea, excessive snoring or daytime drowsiness? YES - see above   Do you have a CPAP machine? No   Do you have any artifical heart valves or other implanted medical devices like a pacemaker, defibrillator, or continuous glucose monitor? No   Do you have artificial joints? No   Are you allergic to latex? No     Health Care Directive  Patient does not have a Health Care Directive or Living Will:     Preoperative Review of    reviewed - no record of controlled substances prescribed.          Patient Active Problem List    Diagnosis Date Noted    Cervical radiculopathy 05/01/2024     Priority: Medium    Macular degeneration (senile) of retina 08/23/2021     Priority: Medium    Osteoporosis, unspecified osteoporosis type, unspecified pathological fracture presence 08/23/2021     Priority: Medium    Hyperlipemia      Priority: Medium     Created by Conversion        Hearing Loss      Priority: Medium     Created by Conversion  Replacement Utility updated for latest IMO load        Osteopenia       Priority: Medium     Created by Conversion  Replacement Utility updated for latest IMO load          No past medical history on file.  Past Surgical History:   Procedure Laterality Date    ANKLE FRACTURE SURGERY      CHOLECYSTECTOMY      FRACTURE SURGERY      [facility] OPEN TX BIMALLEOLAR ANKLE FX, INCL INTERNAL FIXATION      Description: Open Treatment Of Bimalleolar Ankle Fracture;  Recorded: 01/12/2010;    [facility] REMOVAL GALLBLADDER      Description: Cholecystectomy;  Recorded: 01/12/2010;    [facility] REMOVE TONSILS/ADENOIDS,<13 Y/O      Description: Tonsillectomy With Adenoidectomy;  Recorded: 01/12/2010;    HYSTERECTOMY      [facility] ARVIZU W/O FACETEC FORAMOT/DSKC 1/2 VRT SEG, LUMBAR      Description: Laminectomy Decompressive Up To Two Lumbar Segments;  Recorded: 01/12/2010;  Comments: X2 SEPARATE BACK SURGERIES    [facility] LIGATE FALLOPIAN TUBE      Description: Tubal Ligation;  Recorded: 01/12/2010;    [facility] TOTAL ABDOM HYSTERECTOMY      Description: Total Abdominal Hysterectomy;  Recorded: 01/12/2010;    [facility] TOTAL ABDOM HYSTERECTOMY      Description: Hysterectomy;  Recorded: 01/12/2010;     Current Outpatient Medications   Medication Sig Dispense Refill    Ascorbic Acid (VITAMIN C) 500 MG CAPS       atorvastatin (LIPITOR) 20 MG tablet Take 1 tablet (20 mg) by mouth daily 90 tablet 3    cholecalciferol, vitamin D3, 2,000 unit Tab [CHOLECALCIFEROL, VITAMIN D3, 2,000 UNIT TAB] 2 tabs a day 100 each 3    cyanocobalamin (VITAMIN B-12) 500 MCG tablet Take 500 mcg by mouth once a week      cyclobenzaprine (FLEXERIL) 5 MG tablet Take 5-10 mg (1 or 2 tablets) by mouth 3 times a day as needed for pain/spasms. 45 tablet 1    gabapentin (NEURONTIN) 300 MG capsule Take 3 capsules by mouth 3 times a day. 270 capsule 1    lactobacillus rhamnosus, GG, (CULTURELL) capsule Take 1 capsule by mouth 2 times daily K Sharmila      meloxicam (MOBIC) 7.5 MG tablet Take 1 or 2 tablets a day as needed for pain. 60 tablet 0    Protein POWD Uses a whey blend       "UNABLE TO FIND MEDICATION NAME: Primrose supplement         Allergies   Allergen Reactions    Codeine Sulfate [Codeine] Unknown        Social History     Tobacco Use    Smoking status: Former     Current packs/day: 0.00     Average packs/day: 1 pack/day for 30.1 years (30.1 ttl pk-yrs)     Types: Cigarettes     Start date: 1982     Quit date: 2012     Years since quittin.3    Smokeless tobacco: Never   Substance Use Topics    Alcohol use: No       History   Drug Use No           Complete review of systems is obtained.  Other than the specific considerations noted above complete review of systems is negative.      Objective    BP (!) 146/78   Pulse 57   Temp 98  F (36.7  C)   Resp 18   Ht 1.6 m (5' 3\")   Wt 70.8 kg (156 lb)   SpO2 93%   BMI 27.63 kg/m     Estimated body mass index is 27.63 kg/m  as calculated from the following:    Height as of this encounter: 1.6 m (5' 3\").    Weight as of this encounter: 70.8 kg (156 lb).  Physical Exam        General Appearance:    Alert, cooperative, no distress   Eyes:   No scleral icterus or conjunctival irritation       Ears:    Normal TM's and external ear canals, both ears   Throat:   Lips, mucosa, and tongue normal; teeth and gums normal   Neck:   Supple, symmetrical, trachea midline, no adenopathy;        thyroid:  No enlargement/tenderness/nodules   Lungs:     Clear to auscultation bilaterally, respirations unlabored, no wheezes or crackles   Heart:    Regular rate and rhythm,  No murmur   Abdomen:    Soft, no distention, no tenderness on palpation, no masses, no organomegaly     Extremities:  No edema, no joint swelling or redness, no evidence of any injuries   Skin:  No concerning skin findings, no suspicious moles, no rashes   Neurologic:  On gross examination there is no motor or sensory deficit.  Patient walks with a normal gait               Recent Labs   Lab Test 24  1117 23  1455   HGB 13.0 14.2    211   NA  --  141 "   POTASSIUM  --  4.9   CR  --  0.66        Diagnostics  Labs pending at this time.  Results will be reviewed when available.     Recent Results (from the past 24 hour(s))   CBC with platelets    Collection Time: 05/20/24 11:17 AM   Result Value Ref Range    WBC Count 5.7 4.0 - 11.0 10e3/uL    RBC Count 4.00 3.80 - 5.20 10e6/uL    Hemoglobin 13.0 11.7 - 15.7 g/dL    Hematocrit 38.7 35.0 - 47.0 %    MCV 97 78 - 100 fL    MCH 32.5 26.5 - 33.0 pg    MCHC 33.6 31.5 - 36.5 g/dL    RDW 12.4 10.0 - 15.0 %    Platelet Count 165 150 - 450 10e3/uL   EKG 12-lead, tracing only    Collection Time: 05/20/24 11:24 AM   Result Value Ref Range    Systolic Blood Pressure  mmHg    Diastolic Blood Pressure  mmHg    Ventricular Rate 53 BPM    Atrial Rate 53 BPM    MA Interval 176 ms    QRS Duration 88 ms     ms    QTc 412 ms    P Axis 27 degrees    R AXIS 17 degrees    T Axis 15 degrees    Interpretation ECG       Sinus bradycardia  Otherwise normal ECG  No previous ECGs available        Last Comprehensive Metabolic Panel:  Lab Results   Component Value Date     05/20/2024    POTASSIUM 4.9 05/20/2024    CHLORIDE 105 05/20/2024    CO2 27 05/20/2024    ANIONGAP 9 05/20/2024     (H) 05/20/2024    BUN 9.3 05/20/2024    CR 0.73 05/20/2024    GFRESTIMATED 84 05/20/2024    JANET 9.0 05/20/2024        Venous Ultrasound 5/22/2024     Impression: No evidence of Deep Vein Thrombus in left lower extremity.       Revised Cardiac Risk Index (RCRI)  The patient has the following serious cardiovascular risks for perioperative complications:   - No serious cardiac risks = 0 points     RCRI Interpretation: 0 points: Class I (very low risk - 0.4% complication rate)         Signed Electronically by: Abrahan Diez MD, MD  Copy of this evaluation report is provided to requesting physician.

## 2024-05-22 ENCOUNTER — ANCILLARY PROCEDURE (OUTPATIENT)
Dept: VASCULAR ULTRASOUND | Facility: CLINIC | Age: 79
End: 2024-05-22
Attending: FAMILY MEDICINE
Payer: COMMERCIAL

## 2024-05-22 DIAGNOSIS — R22.42 LOCALIZED SWELLING OF LEFT FOOT: ICD-10-CM

## 2024-05-22 PROCEDURE — 93971 EXTREMITY STUDY: CPT | Mod: 26 | Performed by: SURGERY

## 2024-05-22 PROCEDURE — 93971 EXTREMITY STUDY: CPT | Mod: LT

## 2024-06-06 ENCOUNTER — VIRTUAL VISIT (OUTPATIENT)
Dept: NEUROSURGERY | Facility: CLINIC | Age: 79
End: 2024-06-06
Payer: COMMERCIAL

## 2024-06-06 DIAGNOSIS — M54.12 CERVICAL RADICULOPATHY: Primary | ICD-10-CM

## 2024-06-06 PROCEDURE — 99207 PR NO CHARGE NURSE ONLY: CPT | Mod: 93

## 2024-06-06 NOTE — PROGRESS NOTES
Called patient, discussed surgery, post-op course, expectations, follow up plan.    Reviewed H&P from 5/20/24 - approval given  Labs, EKG - within acceptable limits, sinus bradycardia    MRI done on 10/7/23 - in [place]    To OR as planned.     Procedure- Right open door laminoplasty at cervical 5-cervical 6 with bilateral foraminotomies     [facility]    Start time - 11:35 AM    Check in - 9:35 AM    Nothing to eat or drink after midnight the night before surgery.     Reviewed with patient: Arrive 2 hours prior to scheduled surgery.    Continue to refrain from NSAIDS (Ibuprofen, Aleve, Naprosyn), ASA, Over the counter herbal medications or supplements, anti-coagulants and blood thinners.     Patient confirmed they have help/assistance in place at home upon discharge    Instructions: using a washcloth and a bottle of provided Hibiclens, wash your body, avoiding your face and genitals. Preferably, shower the night before surgery and the morning of surgery using a half a bottle each time for your whole body shower.    Patient was informed that we will provide up to 6 weeks prescription of pain medication for post-operative pain.    Instructed patient about the following: After your surgery, if you will be staying in-patient, a nursing provider team will be monitoring you closely throughout your stay and communicate your health status to your surgeon and other providers.  You will be seen by Advanced Practice Providers (e.g., nurse practitioners, clinic nurse specialist, and physician assistants) who will check on you regularly to assess the status of your surgery.

## 2024-06-10 ENCOUNTER — MYC MEDICAL ADVICE (OUTPATIENT)
Dept: PHYSICAL MEDICINE AND REHAB | Facility: CLINIC | Age: 79
End: 2024-06-10
Payer: COMMERCIAL

## 2024-06-10 NOTE — TELEPHONE ENCOUNTER
Called Jesika who inquired about another set of pads for her [place] collar. She voiced to put this order on hold for now.  Dianelys Arguello RN, CNRN

## 2024-06-13 NOTE — TELEPHONE ENCOUNTER
Cancelled and rescheduled surgery from 6/14@ [facility] to 6/26 @ 's per Annie's request. Called patient and gave her surgery details for new surgery location and date. She verbalized understanding.

## 2024-06-20 ENCOUNTER — OFFICE VISIT (OUTPATIENT)
Dept: FAMILY MEDICINE | Facility: CLINIC | Age: 79
End: 2024-06-20
Payer: COMMERCIAL

## 2024-06-20 VITALS
WEIGHT: 156 LBS | OXYGEN SATURATION: 94 % | BODY MASS INDEX: 27.64 KG/M2 | HEART RATE: 57 BPM | DIASTOLIC BLOOD PRESSURE: 78 MMHG | RESPIRATION RATE: 18 BRPM | TEMPERATURE: 98 F | SYSTOLIC BLOOD PRESSURE: 136 MMHG | HEIGHT: 63 IN

## 2024-06-20 DIAGNOSIS — M54.12 CERVICAL RADICULOPATHY: ICD-10-CM

## 2024-06-20 DIAGNOSIS — Z01.818 PREOP GENERAL PHYSICAL EXAM: Primary | ICD-10-CM

## 2024-06-20 LAB
BASOPHILS # BLD AUTO: 0.1 10E3/UL (ref 0–0.2)
BASOPHILS NFR BLD AUTO: 1 %
EOSINOPHIL # BLD AUTO: 0.2 10E3/UL (ref 0–0.7)
EOSINOPHIL NFR BLD AUTO: 3 %
ERYTHROCYTE [DISTWIDTH] IN BLOOD BY AUTOMATED COUNT: 12.3 % (ref 10–15)
HCT VFR BLD AUTO: 40.6 % (ref 35–47)
HGB BLD-MCNC: 13.6 G/DL (ref 11.7–15.7)
IMM GRANULOCYTES # BLD: 0 10E3/UL
IMM GRANULOCYTES NFR BLD: 0 %
LYMPHOCYTES # BLD AUTO: 2.9 10E3/UL (ref 0.8–5.3)
LYMPHOCYTES NFR BLD AUTO: 36 %
MCH RBC QN AUTO: 32.2 PG (ref 26.5–33)
MCHC RBC AUTO-ENTMCNC: 33.5 G/DL (ref 31.5–36.5)
MCV RBC AUTO: 96 FL (ref 78–100)
MONOCYTES # BLD AUTO: 0.6 10E3/UL (ref 0–1.3)
MONOCYTES NFR BLD AUTO: 8 %
NEUTROPHILS # BLD AUTO: 4.1 10E3/UL (ref 1.6–8.3)
NEUTROPHILS NFR BLD AUTO: 52 %
PLATELET # BLD AUTO: 166 10E3/UL (ref 150–450)
RBC # BLD AUTO: 4.23 10E6/UL (ref 3.8–5.2)
WBC # BLD AUTO: 7.9 10E3/UL (ref 4–11)

## 2024-06-20 PROCEDURE — 99214 OFFICE O/P EST MOD 30 MIN: CPT | Performed by: FAMILY MEDICINE

## 2024-06-20 PROCEDURE — 85025 COMPLETE CBC W/AUTO DIFF WBC: CPT | Performed by: FAMILY MEDICINE

## 2024-06-20 PROCEDURE — 80048 BASIC METABOLIC PNL TOTAL CA: CPT | Performed by: FAMILY MEDICINE

## 2024-06-20 PROCEDURE — 36415 COLL VENOUS BLD VENIPUNCTURE: CPT | Performed by: FAMILY MEDICINE

## 2024-06-20 ASSESSMENT — PAIN SCALES - GENERAL: PAINLEVEL: NO PAIN (0)

## 2024-06-20 NOTE — PROGRESS NOTES
Preoperative Evaluation  [facility]  [address]  [facility] [address]  Phone: [phone]  Fax: [phone]  Primary Provider: Abrahan Diez MD, MD  Pre-op Performing Provider: Arnold Villafuerte MD  Jun 20, 2024 6/19/2024   Surgical Information   What procedure is being done? Right open door laminoplasty at cervical 5-cervical 6 with bilateral foraminotomies - 115pm [facility] or Hospital where procedure/surgery will be performed: [facility]   Who is doing the procedure / surgery? Dr. Karishma Cameron   Date of surgery / procedure: June 26, 2024   Time of surgery / procedure: 1 PM   Where do you plan to recover after surgery? at a TCU (Transitional Care Unit)        Fax number for surgical facility: Note does not need to be faxed, will be available electronically in Epic.    Assessment & Plan     The proposed surgical procedure is considered LOW risk.    Preop general physical exam      - Basic metabolic panel  (Ca, Cl, CO2, Creat, Gluc, K, Na, BUN); Future  - CBC with platelets; Future          Possible Sleep Apnea:                   Recommendation  Approval given to proceed with proposed procedure, without further diagnostic evaluation.    Loly Canchola is a 78 year old, presenting for the following:  Pre-Op Exam and Recheck Medication        HPI related to upcoming procedure: Patient is scheduled for a cervical laminotomy with hardware placement because of intractable pain; this is a repeat preoperative exam which was performed 1 month ago and clearance was given but because of a scheduling problem 1 month has elapsed.  Patient lives alone and will need home care which will be arranged after the surgery.  Past medical history social history family history reviewed.  I will repeat her laboratories today her EKG is recent.  She has been therapeutically optimized for the anticipated procedure under general anesthesia.  Postoperative pain management  by neurosurgery Dr. Fernandez.  All medical questions asked were answered once again she has been cleared for the anticipated surgery on all forms of anesthesia.        6/19/2024   Pre-Op Questionnaire   Have you ever had a heart attack or stroke? No   Have you ever had surgery on your heart or blood vessels, such as a stent placement, a coronary artery bypass, or surgery on an artery in your head, neck, heart, or legs? No   Do you have chest pain with activity? No   Do you have a history of heart failure? No   Do you currently have a cold, bronchitis or symptoms of other infection? No   Do you have a cough, shortness of breath, or wheezing? (!) YES    Do you or anyone in your family have previous history of blood clots? No   Do you or does anyone in your family have a serious bleeding problem such as prolonged bleeding following surgeries or cuts? No   Have you ever had problems with anemia or been told to take iron pills? No   Have you had any abnormal blood loss such as black, tarry or bloody stools, or abnormal vaginal bleeding? No   Have you ever had a blood transfusion? No   Are you willing to have a blood transfusion if it is medically needed before, during, or after your surgery? Yes   Have you or any of your relatives ever had problems with anesthesia? No   Do you have sleep apnea, excessive snoring or daytime drowsiness? (!) YES   Do you have a CPAP machine? (!) NO    Do you have any artifical heart valves or other implanted medical devices like a pacemaker, defibrillator, or continuous glucose monitor? No   Do you have artificial joints? No   Are you allergic to latex? No        Health Care Directive  Patient does not have a Health Care Directive or Living Will: Discussed advance care planning with patient; however, patient declined at this time.    Preoperative Review of    reviewed - no record of controlled substances prescribed.          Patient Active Problem List    Diagnosis Date Noted     Cervical radiculopathy 05/01/2024     Priority: Medium    Macular degeneration (senile) of retina 08/23/2021     Priority: Medium    Osteoporosis, unspecified osteoporosis type, unspecified pathological fracture presence 08/23/2021     Priority: Medium    Hyperlipemia      Priority: Medium     Created by Conversion        Hearing Loss      Priority: Medium     Created by Conversion  Replacement Utility updated for latest IMO load        Osteopenia      Priority: Medium     Created by Conversion  Replacement Utility updated for latest IMO load          No past medical history on file.  Past Surgical History:   Procedure Laterality Date    ANKLE FRACTURE SURGERY      CHOLECYSTECTOMY      FRACTURE SURGERY      [facility] OPEN TX BIMALLEOLAR ANKLE FX, INCL INTERNAL FIXATION      Description: Open Treatment Of Bimalleolar Ankle Fracture;  Recorded: 01/12/2010;    [facility] REMOVAL GALLBLADDER      Description: Cholecystectomy;  Recorded: 01/12/2010;    [facility] REMOVE TONSILS/ADENOIDS,<11 Y/O      Description: Tonsillectomy With Adenoidectomy;  Recorded: 01/12/2010;    HYSTERECTOMY      [facility] ARVIZU W/O FACETEC FORAMOT/DSKC 1/2 VRT SEG, LUMBAR      Description: Laminectomy Decompressive Up To Two Lumbar Segments;  Recorded: 01/12/2010;  Comments: X2 SEPARATE BACK SURGERIES    [facility] LIGATE FALLOPIAN TUBE      Description: Tubal Ligation;  Recorded: 01/12/2010;    [facility] TOTAL ABDOM HYSTERECTOMY      Description: Total Abdominal Hysterectomy;  Recorded: 01/12/2010;    [facility] TOTAL ABDOM HYSTERECTOMY      Description: Hysterectomy;  Recorded: 01/12/2010;     Current Outpatient Medications   Medication Sig Dispense Refill    atorvastatin (LIPITOR) 20 MG tablet Take 1 tablet (20 mg) by mouth daily 90 tablet 3    gabapentin (NEURONTIN) 300 MG capsule Take 3 capsules by mouth 3 times a day. 270 capsule 1       Allergies   Allergen Reactions    Codeine Sulfate [Codeine] Unknown        Social History     Tobacco Use    Smoking status: Former     Current  "packs/day: 0.00     Average packs/day: 1 pack/day for 30.1 years (30.1 ttl pk-yrs)     Types: Cigarettes     Start date: 1982     Quit date: 2012     Years since quittin.3    Smokeless tobacco: Never   Substance Use Topics    Alcohol use: No       History   Drug Use No             Review of Systems  CONSTITUTIONAL: NEGATIVE for fever, chills, change in weight  INTEGUMENTARY/SKIN: NEGATIVE for worrisome rashes, moles or lesions  EYES: NEGATIVE for vision changes or irritation  ENT/MOUTH: NEGATIVE for ear, mouth and throat problems  RESP: NEGATIVE for significant cough or SOB  BREAST: NEGATIVE for masses, tenderness or discharge  CV: NEGATIVE for chest pain, palpitations or peripheral edema  GI: NEGATIVE for nausea, abdominal pain, heartburn, or change in bowel habits  : NEGATIVE for frequency, dysuria, or hematuria  MUSCULOSKELETAL: NEGATIVE for significant arthralgias or myalgia  NEURO: NEGATIVE for weakness, dizziness or paresthesias  ENDOCRINE: NEGATIVE for temperature intolerance, skin/hair changes  HEME: NEGATIVE for bleeding problems  PSYCHIATRIC: NEGATIVE for changes in mood or affect    Objective    /78   Pulse 57   Temp 98  F (36.7  C)   Resp 18   Ht 1.6 m (5' 3\")   Wt 70.8 kg (156 lb)   SpO2 94%   BMI 27.63 kg/m     Estimated body mass index is 27.63 kg/m  as calculated from the following:    Height as of this encounter: 1.6 m (5' 3\").    Weight as of this encounter: 70.8 kg (156 lb).  Physical Exam  GENERAL: alert and no distress  EYES: Eyes grossly normal to inspection, PERRL and conjunctivae and sclerae normal  HENT: ear canals and TM's normal, nose and mouth without ulcers or lesions  NECK: no adenopathy, no asymmetry, masses, or scars  RESP: lungs clear to auscultation - no rales, rhonchi or wheezes  CV: regular rate and rhythm, normal S1 S2, no S3 or S4, no murmur, click or rub, no peripheral edema  ABDOMEN: soft, nontender, no hepatosplenomegaly, no masses and bowel sounds " normal  MS: no gross musculoskeletal defects noted, no edema  SKIN: no suspicious lesions or rashes  NEURO: Normal strength and tone, mentation intact and speech normal  PSYCH: mentation appears normal, affect normal/bright    Recent Labs   Lab Test 05/20/24  1117 07/18/23  1455   HGB 13.0 14.2    211    141   POTASSIUM 4.9 4.9   CR 0.73 0.66        Diagnostics  Labs pending at this time.  Results will be reviewed when available.   No EKG this visit, completed in the last 90 days.    Revised Cardiac Risk Index (RCRI)  The patient has the following serious cardiovascular risks for perioperative complications:   - No serious cardiac risks = 0 points     RCRI Interpretation: 1 point: Class II (low risk - 0.9% complication rate)         Signed Electronically by: Arnold Villafuerte MD  Copy of this evaluation report is provided to requesting physician.

## 2024-06-20 NOTE — H&P (VIEW-ONLY)
Preoperative Evaluation  [facility]  [address]  [facility] [address]  Phone: [phone]  Fax: [phone]  Primary Provider: Abrahan Diez MD, MD  Pre-op Performing Provider: Arnold Villafuerte MD  Jun 20, 2024 6/19/2024   Surgical Information   What procedure is being done? Right open door laminoplasty at cervical 5-cervical 6 with bilateral foraminotomies - 115pm [facility] or Hospital where procedure/surgery will be performed: [facility]   Who is doing the procedure / surgery? Dr. Karishma Cameron   Date of surgery / procedure: June 26, 2024   Time of surgery / procedure: 1 PM   Where do you plan to recover after surgery? at a TCU (Transitional Care Unit)        Fax number for surgical facility: Note does not need to be faxed, will be available electronically in Epic.    Assessment & Plan     The proposed surgical procedure is considered LOW risk.    Preop general physical exam      - Basic metabolic panel  (Ca, Cl, CO2, Creat, Gluc, K, Na, BUN); Future  - CBC with platelets; Future          Possible Sleep Apnea:                   Recommendation  Approval given to proceed with proposed procedure, without further diagnostic evaluation.    Loly Canchola is a 78 year old, presenting for the following:  Pre-Op Exam and Recheck Medication        HPI related to upcoming procedure: Patient is scheduled for a cervical laminotomy with hardware placement because of intractable pain; this is a repeat preoperative exam which was performed 1 month ago and clearance was given but because of a scheduling problem 1 month has elapsed.  Patient lives alone and will need home care which will be arranged after the surgery.  Past medical history social history family history reviewed.  I will repeat her laboratories today her EKG is recent.  She has been therapeutically optimized for the anticipated procedure under general anesthesia.  Postoperative pain management  by neurosurgery Dr. Fernandez.  All medical questions asked were answered once again she has been cleared for the anticipated surgery on all forms of anesthesia.        6/19/2024   Pre-Op Questionnaire   Have you ever had a heart attack or stroke? No   Have you ever had surgery on your heart or blood vessels, such as a stent placement, a coronary artery bypass, or surgery on an artery in your head, neck, heart, or legs? No   Do you have chest pain with activity? No   Do you have a history of heart failure? No   Do you currently have a cold, bronchitis or symptoms of other infection? No   Do you have a cough, shortness of breath, or wheezing? (!) YES    Do you or anyone in your family have previous history of blood clots? No   Do you or does anyone in your family have a serious bleeding problem such as prolonged bleeding following surgeries or cuts? No   Have you ever had problems with anemia or been told to take iron pills? No   Have you had any abnormal blood loss such as black, tarry or bloody stools, or abnormal vaginal bleeding? No   Have you ever had a blood transfusion? No   Are you willing to have a blood transfusion if it is medically needed before, during, or after your surgery? Yes   Have you or any of your relatives ever had problems with anesthesia? No   Do you have sleep apnea, excessive snoring or daytime drowsiness? (!) YES   Do you have a CPAP machine? (!) NO    Do you have any artifical heart valves or other implanted medical devices like a pacemaker, defibrillator, or continuous glucose monitor? No   Do you have artificial joints? No   Are you allergic to latex? No        Health Care Directive  Patient does not have a Health Care Directive or Living Will: Discussed advance care planning with patient; however, patient declined at this time.    Preoperative Review of    reviewed - no record of controlled substances prescribed.          Patient Active Problem List    Diagnosis Date Noted     Cervical radiculopathy 05/01/2024     Priority: Medium    Macular degeneration (senile) of retina 08/23/2021     Priority: Medium    Osteoporosis, unspecified osteoporosis type, unspecified pathological fracture presence 08/23/2021     Priority: Medium    Hyperlipemia      Priority: Medium     Created by Conversion        Hearing Loss      Priority: Medium     Created by Conversion  Replacement Utility updated for latest IMO load        Osteopenia      Priority: Medium     Created by Conversion  Replacement Utility updated for latest IMO load          No past medical history on file.  Past Surgical History:   Procedure Laterality Date    ANKLE FRACTURE SURGERY      CHOLECYSTECTOMY      FRACTURE SURGERY      HC OPEN TX BIMALLEOLAR ANKLE FX, INCL INTERNAL FIXATION      Description: Open Treatment Of Bimalleolar Ankle Fracture;  Recorded: 01/12/2010;    HC REMOVAL GALLBLADDER      Description: Cholecystectomy;  Recorded: 01/12/2010;    HC REMOVE TONSILS/ADENOIDS,<11 Y/O      Description: Tonsillectomy With Adenoidectomy;  Recorded: 01/12/2010;    HYSTERECTOMY      [facility] ARVIZU W/O FACETEC FORAMOT/DSKC 1/2 VRT SEG, LUMBAR      Description: Laminectomy Decompressive Up To Two Lumbar Segments;  Recorded: 01/12/2010;  Comments: X2 SEPARATE BACK SURGERIES    [facility] LIGATE FALLOPIAN TUBE      Description: Tubal Ligation;  Recorded: 01/12/2010;    [facility] TOTAL ABDOM HYSTERECTOMY      Description: Total Abdominal Hysterectomy;  Recorded: 01/12/2010;    [facility] TOTAL ABDOM HYSTERECTOMY      Description: Hysterectomy;  Recorded: 01/12/2010;     Current Outpatient Medications   Medication Sig Dispense Refill    atorvastatin (LIPITOR) 20 MG tablet Take 1 tablet (20 mg) by mouth daily 90 tablet 3    gabapentin (NEURONTIN) 300 MG capsule Take 3 capsules by mouth 3 times a day. 270 capsule 1       Allergies   Allergen Reactions    Codeine Sulfate [Codeine] Unknown        Social History     Tobacco Use    Smoking status: Former     Current  "packs/day: 0.00     Average packs/day: 1 pack/day for 30.1 years (30.1 ttl pk-yrs)     Types: Cigarettes     Start date: 1982     Quit date: 2012     Years since quittin.3    Smokeless tobacco: Never   Substance Use Topics    Alcohol use: No       History   Drug Use No             Review of Systems  CONSTITUTIONAL: NEGATIVE for fever, chills, change in weight  INTEGUMENTARY/SKIN: NEGATIVE for worrisome rashes, moles or lesions  EYES: NEGATIVE for vision changes or irritation  ENT/MOUTH: NEGATIVE for ear, mouth and throat problems  RESP: NEGATIVE for significant cough or SOB  BREAST: NEGATIVE for masses, tenderness or discharge  CV: NEGATIVE for chest pain, palpitations or peripheral edema  GI: NEGATIVE for nausea, abdominal pain, heartburn, or change in bowel habits  : NEGATIVE for frequency, dysuria, or hematuria  MUSCULOSKELETAL: NEGATIVE for significant arthralgias or myalgia  NEURO: NEGATIVE for weakness, dizziness or paresthesias  ENDOCRINE: NEGATIVE for temperature intolerance, skin/hair changes  HEME: NEGATIVE for bleeding problems  PSYCHIATRIC: NEGATIVE for changes in mood or affect    Objective    /78   Pulse 57   Temp 98  F (36.7  C)   Resp 18   Ht 1.6 m (5' 3\")   Wt 70.8 kg (156 lb)   SpO2 94%   BMI 27.63 kg/m     Estimated body mass index is 27.63 kg/m  as calculated from the following:    Height as of this encounter: 1.6 m (5' 3\").    Weight as of this encounter: 70.8 kg (156 lb).  Physical Exam  GENERAL: alert and no distress  EYES: Eyes grossly normal to inspection, PERRL and conjunctivae and sclerae normal  HENT: ear canals and TM's normal, nose and mouth without ulcers or lesions  NECK: no adenopathy, no asymmetry, masses, or scars  RESP: lungs clear to auscultation - no rales, rhonchi or wheezes  CV: regular rate and rhythm, normal S1 S2, no S3 or S4, no murmur, click or rub, no peripheral edema  ABDOMEN: soft, nontender, no hepatosplenomegaly, no masses and bowel sounds " normal  MS: no gross musculoskeletal defects noted, no edema  SKIN: no suspicious lesions or rashes  NEURO: Normal strength and tone, mentation intact and speech normal  PSYCH: mentation appears normal, affect normal/bright    Recent Labs   Lab Test 05/20/24  1117 07/18/23  1455   HGB 13.0 14.2    211    141   POTASSIUM 4.9 4.9   CR 0.73 0.66        Diagnostics  Labs pending at this time.  Results will be reviewed when available.   No EKG this visit, completed in the last 90 days.    Revised Cardiac Risk Index (RCRI)  The patient has the following serious cardiovascular risks for perioperative complications:   - No serious cardiac risks = 0 points     RCRI Interpretation: 1 point: Class II (low risk - 0.9% complication rate)         Signed Electronically by: Arnold Villafuerte MD  Copy of this evaluation report is provided to requesting physician.

## 2024-06-21 ENCOUNTER — ALLIED HEALTH/NURSE VISIT (OUTPATIENT)
Dept: NEUROSURGERY | Facility: CLINIC | Age: 79
End: 2024-06-21
Payer: COMMERCIAL

## 2024-06-21 DIAGNOSIS — M54.12 CERVICAL RADICULOPATHY: Primary | ICD-10-CM

## 2024-06-21 LAB
ANION GAP SERPL CALCULATED.3IONS-SCNC: 7 MMOL/L (ref 7–15)
BUN SERPL-MCNC: 11.6 MG/DL (ref 8–23)
CALCIUM SERPL-MCNC: 9.6 MG/DL (ref 8.8–10.2)
CHLORIDE SERPL-SCNC: 106 MMOL/L (ref 98–107)
CREAT SERPL-MCNC: 0.74 MG/DL (ref 0.51–0.95)
DEPRECATED HCO3 PLAS-SCNC: 28 MMOL/L (ref 22–29)
EGFRCR SERPLBLD CKD-EPI 2021: 82 ML/MIN/1.73M2
GLUCOSE SERPL-MCNC: 101 MG/DL (ref 70–99)
POTASSIUM SERPL-SCNC: 5 MMOL/L (ref 3.4–5.3)
SODIUM SERPL-SCNC: 141 MMOL/L (ref 135–145)

## 2024-06-21 PROCEDURE — 99207 PR NO CHARGE NURSE ONLY: CPT

## 2024-06-21 NOTE — PROGRESS NOTES
Patient seen today for pre-op readiness and c-collar adjustment. Collar adjusted to fit jawline better and not interfere with false teeth. Advised patient to follow-up with orthotics clinic for extra pads.    Preop Assessment: Jesika Paez presents for pre-op review.  Surgeon: Jim  Name of Surgery: Right open door laminoplasty at cervical 5-cervical 6 with bilateral foraminotomies   Diagnosis: cervical radiculopathy  Date of Surgery: 24  Time of Surgery: 1:15 PM  Hospital: [facility]  H&P: 24 - approval given  History of ASA, NSAIDS, vitamin and/or herbal supplements within 10 days: no  History of blood thinners: no  History of anti-seizure med's: no  Review of systems: complete per H&P    Diagnostics:  Labs: within acceptable limits  CXR: N/A  EK24 - sinus bradycardia  Other: N/A  Films: XR 10/17/23    Last BM - 24  Nausea or Vomiting - denies  Urinary retention - denies  Pain management - no Red Flags  Home PT Evaluation: ambulates independently, steady gait and stride, no imbalance noted  - no indication for Home PT pre-op  Patient confirmed they have help/assistance in place at home upon discharge    All questions answered regarding surgery and expected pre and postoperative course including rehabilitation phase.     Reviewed with patient: Arrive 2.5 -3 hours prior to scheduled surgery, nothing to eat or drink after midnight the night before surgery and bring all pertinent films to the hospital the day of surgery.  Continue to refrain from NSAIDS (Ibuprofen, Aleve, Naprosyn) ASA or over the counter herbal medication or supplements, anticoagulants and blood thinners.    Preop skin preparations and instructions provided.    Incentive Spirometer usage instructions provided.    Patient confirmed they have help/assistance in place at home upon discharge.

## 2024-06-21 NOTE — PATIENT INSTRUCTIONS
WEARING THE COLLAR:   Kevin EFREM: (collar with blue pads) wear when out of bed. You may remove the collar to shower and eat if it interferes.   * You should have two sets of blue pads with your collar.  Wash and exchange the pads at least daily. Wash the pads in mild soapand lay flat to dry.   *The collar should fit snuggly.   *Clean and check the skin under your collar at least daily.   *Cleaning the skin under the collar with a mild astringent (witch hazel) will      decreases the likelihood of skin irritation.  DO NOT apply to incision.  Always     check for allergy before using.  Jaky: (pink foam collar): Use for showers.    *You should sit to shower to limitthe risk of falling.    Soft collar: If you were given soft white collar use only when sleeping.     * No lifting, pushing or pulling greater than 5-10 pound (this is about a gallon of milk) for the first 6 weeks after surgery .  *No repetitive bending, twisting, or jarring activities for 6 weeks.  *No overhead work  *No aerobic or strenuous activity  *No activities with increased risk of falls  *You may move about your home as tolerated  *You may walk up and down stairs as tolerated  *You may increase your activity slowly over the next 6 weeks    WALKING PROGRAM: As you can tolerate, walk daily-start with 5-10 minutes of continuous walking. This is in addition to the walking that you do as part of your daily activities. Increase the time that you walk by 5 minutes every couple of days. Do not exceed 30-45 minutes of continuous walking until seen in follow-up. Walking is the best exercise after surgery.  **Listen to your body, if you find that you are more painful or fatigued, you may need to proceed more slowly.    **Do not smoke or expose yourself to second hand smoke. Cigarette smoke can delay healing and cause complications.     DRIVING:  We recommend that you do not drive while taking medications for pain or muscle spasms. Always read and follow the  advice on your prescription bottle. If you have questions, speak with your pharmacist.  We recommend that you do not drive while wearing a brace, as it could limit your range of motion.    WORK: If you plan to return to work before your 6 week post-op appointment, call and discuss with one of the nurses in the neurosurgery office.     Call one of the following clinics to get your extra set of pads.    Orthotics and Prosthetics    [place]  [address]  [address]  [place] [address]  [phone]    [place]  [address]  [address]  [place] [address]  [phone]    [place]  [address]  [address]  [place] [address]  [phone]    [place]  [address]  [address]  [place] [address]  [phone]    [place]  [address]  [address]  [place] [address]  [phone]    [place]  [address] [place] [address]  [phone]    [place]  [address]  [address]  [place] [address]  [phone]    [place]  [address] [place] [address]  [address]  [place], [place] [address] [phone]    [place]  [address]  [address]  [place] [address]  [phone]    [place]  [address]  [address]  [place] [address]  [phone]

## 2024-06-26 ENCOUNTER — ANESTHESIA (OUTPATIENT)
Dept: SURGERY | Facility: HOSPITAL | Age: 79
DRG: 519 | End: 2024-06-26
Payer: COMMERCIAL

## 2024-06-26 ENCOUNTER — APPOINTMENT (OUTPATIENT)
Dept: RADIOLOGY | Facility: HOSPITAL | Age: 79
DRG: 519 | End: 2024-06-26
Attending: PHYSICIAN ASSISTANT
Payer: COMMERCIAL

## 2024-06-26 ENCOUNTER — HOSPITAL ENCOUNTER (INPATIENT)
Facility: HOSPITAL | Age: 79
LOS: 6 days | Discharge: NURSING, REHAB OR LONG-TERM CARE | DRG: 519 | End: 2024-07-02
Attending: SURGERY | Admitting: SURGERY
Payer: COMMERCIAL

## 2024-06-26 ENCOUNTER — ANESTHESIA EVENT (OUTPATIENT)
Dept: SURGERY | Facility: HOSPITAL | Age: 79
DRG: 519 | End: 2024-06-26
Payer: COMMERCIAL

## 2024-06-26 DIAGNOSIS — I10 HYPERTENSION, UNSPECIFIED TYPE: ICD-10-CM

## 2024-06-26 DIAGNOSIS — Z98.890 HISTORY OF SPINAL SURGERY: Primary | ICD-10-CM

## 2024-06-26 LAB
CREAT SERPL-MCNC: 0.67 MG/DL (ref 0.51–0.95)
EGFRCR SERPLBLD CKD-EPI 2021: 89 ML/MIN/1.73M2
GLUCOSE BLDC GLUCOMTR-MCNC: 95 MG/DL (ref 70–99)
HOLD SPECIMEN: NORMAL

## 2024-06-26 PROCEDURE — 250N000013 HC RX MED GY IP 250 OP 250 PS 637: Performed by: ANESTHESIOLOGY

## 2024-06-26 PROCEDURE — 00NW0ZZ RELEASE CERVICAL SPINAL CORD, OPEN APPROACH: ICD-10-PCS | Performed by: SURGERY

## 2024-06-26 PROCEDURE — 36415 COLL VENOUS BLD VENIPUNCTURE: CPT

## 2024-06-26 PROCEDURE — 278N000051 HC OR IMPLANT GENERAL: Performed by: SURGERY

## 2024-06-26 PROCEDURE — 250N000013 HC RX MED GY IP 250 OP 250 PS 637

## 2024-06-26 PROCEDURE — 272N000001 HC OR GENERAL SUPPLY STERILE: Performed by: SURGERY

## 2024-06-26 PROCEDURE — 250N000009 HC RX 250: Performed by: SURGERY

## 2024-06-26 PROCEDURE — 710N000009 HC RECOVERY PHASE 1, LEVEL 1, PER MIN: Performed by: SURGERY

## 2024-06-26 PROCEDURE — 00UT0KZ SUPPLEMENT SPINAL MENINGES WITH NONAUTOLOGOUS TISSUE SUBSTITUTE, OPEN APPROACH: ICD-10-PCS | Performed by: SURGERY

## 2024-06-26 PROCEDURE — 999N000141 HC STATISTIC PRE-PROCEDURE NURSING ASSESSMENT: Performed by: SURGERY

## 2024-06-26 PROCEDURE — 8E09XBZ COMPUTER ASSISTED PROCEDURE OF HEAD AND NECK REGION: ICD-10-PCS | Performed by: SURGERY

## 2024-06-26 PROCEDURE — 250N000009 HC RX 250: Performed by: NURSE ANESTHETIST, CERTIFIED REGISTERED

## 2024-06-26 PROCEDURE — 0PB30ZZ EXCISION OF CERVICAL VERTEBRA, OPEN APPROACH: ICD-10-PCS | Performed by: SURGERY

## 2024-06-26 PROCEDURE — C1713 ANCHOR/SCREW BN/BN,TIS/BN: HCPCS | Performed by: SURGERY

## 2024-06-26 PROCEDURE — 258N000003 HC RX IP 258 OP 636: Performed by: NURSE ANESTHETIST, CERTIFIED REGISTERED

## 2024-06-26 PROCEDURE — 4A11X4G MONITORING OF PERIPHERAL NERVOUS ELECTRICAL ACTIVITY, INTRAOPERATIVE, EXTERNAL APPROACH: ICD-10-PCS | Performed by: SURGERY

## 2024-06-26 PROCEDURE — 258N000003 HC RX IP 258 OP 636

## 2024-06-26 PROCEDURE — 250N000011 HC RX IP 250 OP 636: Mod: JZ

## 2024-06-26 PROCEDURE — 250N000011 HC RX IP 250 OP 636: Performed by: NURSE ANESTHETIST, CERTIFIED REGISTERED

## 2024-06-26 PROCEDURE — 250N000011 HC RX IP 250 OP 636: Performed by: PHYSICIAN ASSISTANT

## 2024-06-26 PROCEDURE — 250N000011 HC RX IP 250 OP 636: Mod: JZ | Performed by: ANESTHESIOLOGY

## 2024-06-26 PROCEDURE — 360N000077 HC SURGERY LEVEL 4, PER MIN: Performed by: SURGERY

## 2024-06-26 PROCEDURE — 01N10ZZ RELEASE CERVICAL NERVE, OPEN APPROACH: ICD-10-PCS | Performed by: SURGERY

## 2024-06-26 PROCEDURE — 82565 ASSAY OF CREATININE: CPT

## 2024-06-26 PROCEDURE — 250N000011 HC RX IP 250 OP 636: Mod: JZ | Performed by: NURSE ANESTHETIST, CERTIFIED REGISTERED

## 2024-06-26 PROCEDURE — 120N000001 HC R&B MED SURG/OB

## 2024-06-26 PROCEDURE — 250N000009 HC RX 250: Performed by: PHYSICIAN ASSISTANT

## 2024-06-26 PROCEDURE — 370N000017 HC ANESTHESIA TECHNICAL FEE, PER MIN: Performed by: SURGERY

## 2024-06-26 PROCEDURE — 258N000003 HC RX IP 258 OP 636: Performed by: ANESTHESIOLOGY

## 2024-06-26 PROCEDURE — 63051 C-LAMINOPLASTY W/GRAFT/PLATE: CPT | Mod: AS

## 2024-06-26 PROCEDURE — 99233 SBSQ HOSP IP/OBS HIGH 50: CPT | Performed by: INTERNAL MEDICINE

## 2024-06-26 PROCEDURE — 72020 X-RAY EXAM OF SPINE 1 VIEW: CPT

## 2024-06-26 PROCEDURE — 63051 C-LAMINOPLASTY W/GRAFT/PLATE: CPT | Performed by: SURGERY

## 2024-06-26 DEVICE — IMPLANTABLE DEVICE: Type: IMPLANTABLE DEVICE | Site: SPINE CERVICAL | Status: FUNCTIONAL

## 2024-06-26 DEVICE — ALLOGRAFT DURAGEN PLUS 2 X 2 DP5022: Type: IMPLANTABLE DEVICE | Site: SPINE CERVICAL | Status: FUNCTIONAL

## 2024-06-26 DEVICE — IMP PLATE MEDT POST CERVICAL 10MM OVAL GRAFT 853-410: Type: IMPLANTABLE DEVICE | Site: SPINE CERVICAL | Status: FUNCTIONAL

## 2024-06-26 RX ORDER — ONDANSETRON 2 MG/ML
4 INJECTION INTRAMUSCULAR; INTRAVENOUS EVERY 6 HOURS PRN
Status: DISCONTINUED | OUTPATIENT
Start: 2024-06-26 | End: 2024-07-02 | Stop reason: HOSPADM

## 2024-06-26 RX ORDER — ONDANSETRON 4 MG/1
4 TABLET, ORALLY DISINTEGRATING ORAL EVERY 30 MIN PRN
Status: DISCONTINUED | OUTPATIENT
Start: 2024-06-26 | End: 2024-06-26 | Stop reason: HOSPADM

## 2024-06-26 RX ORDER — LIDOCAINE 40 MG/G
CREAM TOPICAL
Status: DISCONTINUED | OUTPATIENT
Start: 2024-06-26 | End: 2024-06-26 | Stop reason: HOSPADM

## 2024-06-26 RX ORDER — NALOXONE HYDROCHLORIDE 0.4 MG/ML
0.2 INJECTION, SOLUTION INTRAMUSCULAR; INTRAVENOUS; SUBCUTANEOUS
Status: DISCONTINUED | OUTPATIENT
Start: 2024-06-26 | End: 2024-07-02 | Stop reason: HOSPADM

## 2024-06-26 RX ORDER — HYDRALAZINE HYDROCHLORIDE 20 MG/ML
10 INJECTION INTRAMUSCULAR; INTRAVENOUS EVERY 4 HOURS PRN
Status: DISCONTINUED | OUTPATIENT
Start: 2024-06-26 | End: 2024-07-02 | Stop reason: HOSPADM

## 2024-06-26 RX ORDER — CEFAZOLIN SODIUM 1 G/3ML
1 INJECTION, POWDER, FOR SOLUTION INTRAMUSCULAR; INTRAVENOUS EVERY 8 HOURS
Status: COMPLETED | OUTPATIENT
Start: 2024-06-26 | End: 2024-06-27

## 2024-06-26 RX ORDER — PROPOFOL 10 MG/ML
INJECTION, EMULSION INTRAVENOUS CONTINUOUS PRN
Status: DISCONTINUED | OUTPATIENT
Start: 2024-06-26 | End: 2024-06-26

## 2024-06-26 RX ORDER — ACETAMINOPHEN 325 MG/1
650 TABLET ORAL EVERY 4 HOURS PRN
Status: DISCONTINUED | OUTPATIENT
Start: 2024-06-29 | End: 2024-07-02 | Stop reason: HOSPADM

## 2024-06-26 RX ORDER — SODIUM CHLORIDE 9 MG/ML
INJECTION, SOLUTION INTRAVENOUS CONTINUOUS
Status: DISCONTINUED | OUTPATIENT
Start: 2024-06-26 | End: 2024-06-27

## 2024-06-26 RX ORDER — NALOXONE HYDROCHLORIDE 0.4 MG/ML
0.4 INJECTION, SOLUTION INTRAMUSCULAR; INTRAVENOUS; SUBCUTANEOUS
Status: DISCONTINUED | OUTPATIENT
Start: 2024-06-26 | End: 2024-07-02 | Stop reason: HOSPADM

## 2024-06-26 RX ORDER — FENTANYL CITRATE 50 UG/ML
25-100 INJECTION, SOLUTION INTRAMUSCULAR; INTRAVENOUS
Status: DISCONTINUED | OUTPATIENT
Start: 2024-06-26 | End: 2024-06-26 | Stop reason: HOSPADM

## 2024-06-26 RX ORDER — SODIUM CHLORIDE, SODIUM LACTATE, POTASSIUM CHLORIDE, CALCIUM CHLORIDE 600; 310; 30; 20 MG/100ML; MG/100ML; MG/100ML; MG/100ML
INJECTION, SOLUTION INTRAVENOUS CONTINUOUS
Status: DISCONTINUED | OUTPATIENT
Start: 2024-06-26 | End: 2024-06-26 | Stop reason: HOSPADM

## 2024-06-26 RX ORDER — GABAPENTIN 300 MG/1
900 CAPSULE ORAL 3 TIMES DAILY
Status: DISCONTINUED | OUTPATIENT
Start: 2024-06-26 | End: 2024-07-02 | Stop reason: HOSPADM

## 2024-06-26 RX ORDER — PROCHLORPERAZINE MALEATE 5 MG
5 TABLET ORAL EVERY 6 HOURS PRN
Status: DISCONTINUED | OUTPATIENT
Start: 2024-06-26 | End: 2024-07-02 | Stop reason: HOSPADM

## 2024-06-26 RX ORDER — BUPIVACAINE HYDROCHLORIDE AND EPINEPHRINE 2.5; 5 MG/ML; UG/ML
2 INJECTION, SOLUTION EPIDURAL; INFILTRATION; INTRACAUDAL; PERINEURAL ONCE
Status: DISCONTINUED | OUTPATIENT
Start: 2024-06-26 | End: 2024-06-26 | Stop reason: HOSPADM

## 2024-06-26 RX ORDER — ATORVASTATIN CALCIUM 10 MG/1
20 TABLET, FILM COATED ORAL DAILY
Status: DISCONTINUED | OUTPATIENT
Start: 2024-06-27 | End: 2024-07-02 | Stop reason: HOSPADM

## 2024-06-26 RX ORDER — ACETAMINOPHEN 325 MG/1
975 TABLET ORAL ONCE
Status: COMPLETED | OUTPATIENT
Start: 2024-06-26 | End: 2024-06-26

## 2024-06-26 RX ORDER — ONDANSETRON 4 MG/1
4 TABLET, ORALLY DISINTEGRATING ORAL EVERY 6 HOURS PRN
Status: DISCONTINUED | OUTPATIENT
Start: 2024-06-26 | End: 2024-07-02 | Stop reason: HOSPADM

## 2024-06-26 RX ORDER — KETAMINE HYDROCHLORIDE 10 MG/ML
INJECTION INTRAMUSCULAR; INTRAVENOUS PRN
Status: DISCONTINUED | OUTPATIENT
Start: 2024-06-26 | End: 2024-06-26

## 2024-06-26 RX ORDER — LIDOCAINE HYDROCHLORIDE AND EPINEPHRINE 10; 10 MG/ML; UG/ML
INJECTION, SOLUTION INFILTRATION; PERINEURAL PRN
Status: DISCONTINUED | OUTPATIENT
Start: 2024-06-26 | End: 2024-06-26 | Stop reason: HOSPADM

## 2024-06-26 RX ORDER — ONDANSETRON 2 MG/ML
INJECTION INTRAMUSCULAR; INTRAVENOUS PRN
Status: DISCONTINUED | OUTPATIENT
Start: 2024-06-26 | End: 2024-06-26

## 2024-06-26 RX ORDER — CALCIUM CARBONATE 500 MG/1
500 TABLET, CHEWABLE ORAL 3 TIMES DAILY PRN
Status: DISCONTINUED | OUTPATIENT
Start: 2024-06-26 | End: 2024-07-02 | Stop reason: HOSPADM

## 2024-06-26 RX ORDER — CEFAZOLIN SODIUM/WATER 2 G/20 ML
2 SYRINGE (ML) INTRAVENOUS SEE ADMIN INSTRUCTIONS
Status: DISCONTINUED | OUTPATIENT
Start: 2024-06-26 | End: 2024-06-26 | Stop reason: HOSPADM

## 2024-06-26 RX ORDER — POLYETHYLENE GLYCOL 3350 17 G/17G
17 POWDER, FOR SOLUTION ORAL DAILY
Status: DISCONTINUED | OUTPATIENT
Start: 2024-06-27 | End: 2024-07-02 | Stop reason: HOSPADM

## 2024-06-26 RX ORDER — DEXMEDETOMIDINE HYDROCHLORIDE 4 UG/ML
INJECTION, SOLUTION INTRAVENOUS CONTINUOUS PRN
Status: DISCONTINUED | OUTPATIENT
Start: 2024-06-26 | End: 2024-06-26

## 2024-06-26 RX ORDER — MULTIVITAMIN,THERAPEUTIC
1 TABLET ORAL DAILY
Status: DISCONTINUED | OUTPATIENT
Start: 2024-06-27 | End: 2024-07-02 | Stop reason: HOSPADM

## 2024-06-26 RX ORDER — OXYCODONE HYDROCHLORIDE 5 MG/1
10 TABLET ORAL EVERY 4 HOURS PRN
Status: DISCONTINUED | OUTPATIENT
Start: 2024-06-26 | End: 2024-07-02 | Stop reason: HOSPADM

## 2024-06-26 RX ORDER — HYDROMORPHONE HCL IN WATER/PF 6 MG/30 ML
0.4 PATIENT CONTROLLED ANALGESIA SYRINGE INTRAVENOUS
Status: DISCONTINUED | OUTPATIENT
Start: 2024-06-26 | End: 2024-06-29

## 2024-06-26 RX ORDER — ACETAMINOPHEN 325 MG/1
975 TABLET ORAL EVERY 8 HOURS
Status: COMPLETED | OUTPATIENT
Start: 2024-06-26 | End: 2024-06-29

## 2024-06-26 RX ORDER — LANOLIN ALCOHOL/MO/W.PET/CERES
3 CREAM (GRAM) TOPICAL
Status: DISCONTINUED | OUTPATIENT
Start: 2024-06-26 | End: 2024-07-02 | Stop reason: HOSPADM

## 2024-06-26 RX ORDER — BISACODYL 10 MG
10 SUPPOSITORY, RECTAL RECTAL DAILY PRN
Status: DISCONTINUED | OUTPATIENT
Start: 2024-06-29 | End: 2024-07-02 | Stop reason: HOSPADM

## 2024-06-26 RX ORDER — PROPOFOL 10 MG/ML
INJECTION, EMULSION INTRAVENOUS PRN
Status: DISCONTINUED | OUTPATIENT
Start: 2024-06-26 | End: 2024-06-26

## 2024-06-26 RX ORDER — ONDANSETRON 2 MG/ML
4 INJECTION INTRAMUSCULAR; INTRAVENOUS EVERY 30 MIN PRN
Status: DISCONTINUED | OUTPATIENT
Start: 2024-06-26 | End: 2024-06-26 | Stop reason: HOSPADM

## 2024-06-26 RX ORDER — LORATADINE 10 MG/1
10 TABLET ORAL DAILY PRN
Status: DISCONTINUED | OUTPATIENT
Start: 2024-06-26 | End: 2024-07-02 | Stop reason: HOSPADM

## 2024-06-26 RX ORDER — DEXAMETHASONE SODIUM PHOSPHATE 10 MG/ML
INJECTION, SOLUTION INTRAMUSCULAR; INTRAVENOUS PRN
Status: DISCONTINUED | OUTPATIENT
Start: 2024-06-26 | End: 2024-06-26

## 2024-06-26 RX ORDER — LABETALOL HYDROCHLORIDE 5 MG/ML
5 INJECTION, SOLUTION INTRAVENOUS EVERY 10 MIN PRN
Status: DISCONTINUED | OUTPATIENT
Start: 2024-06-26 | End: 2024-06-26 | Stop reason: HOSPADM

## 2024-06-26 RX ORDER — HYDROMORPHONE HCL IN WATER/PF 6 MG/30 ML
0.2 PATIENT CONTROLLED ANALGESIA SYRINGE INTRAVENOUS EVERY 5 MIN PRN
Status: DISCONTINUED | OUTPATIENT
Start: 2024-06-26 | End: 2024-06-26 | Stop reason: HOSPADM

## 2024-06-26 RX ORDER — GABAPENTIN 100 MG/1
100 CAPSULE ORAL
Status: COMPLETED | OUTPATIENT
Start: 2024-06-26 | End: 2024-06-26

## 2024-06-26 RX ORDER — FENTANYL CITRATE-0.9 % NACL/PF 10 MCG/ML
PLASTIC BAG, INJECTION (ML) INTRAVENOUS CONTINUOUS PRN
Status: DISCONTINUED | OUTPATIENT
Start: 2024-06-26 | End: 2024-06-26

## 2024-06-26 RX ORDER — HYDROMORPHONE HCL IN WATER/PF 6 MG/30 ML
0.4 PATIENT CONTROLLED ANALGESIA SYRINGE INTRAVENOUS EVERY 5 MIN PRN
Status: DISCONTINUED | OUTPATIENT
Start: 2024-06-26 | End: 2024-06-26 | Stop reason: HOSPADM

## 2024-06-26 RX ORDER — AMOXICILLIN 250 MG
1 CAPSULE ORAL 2 TIMES DAILY
Status: DISCONTINUED | OUTPATIENT
Start: 2024-06-26 | End: 2024-07-02 | Stop reason: HOSPADM

## 2024-06-26 RX ORDER — OXYCODONE HYDROCHLORIDE 5 MG/1
5 TABLET ORAL EVERY 4 HOURS PRN
Status: DISCONTINUED | OUTPATIENT
Start: 2024-06-26 | End: 2024-07-02 | Stop reason: HOSPADM

## 2024-06-26 RX ORDER — CEFAZOLIN SODIUM/WATER 2 G/20 ML
2 SYRINGE (ML) INTRAVENOUS
Status: COMPLETED | OUTPATIENT
Start: 2024-06-26 | End: 2024-06-26

## 2024-06-26 RX ORDER — METHOCARBAMOL 750 MG/1
750 TABLET, FILM COATED ORAL EVERY 6 HOURS PRN
Status: DISCONTINUED | OUTPATIENT
Start: 2024-06-26 | End: 2024-06-27

## 2024-06-26 RX ORDER — NALOXONE HYDROCHLORIDE 0.4 MG/ML
0.1 INJECTION, SOLUTION INTRAMUSCULAR; INTRAVENOUS; SUBCUTANEOUS
Status: DISCONTINUED | OUTPATIENT
Start: 2024-06-26 | End: 2024-06-26 | Stop reason: HOSPADM

## 2024-06-26 RX ORDER — HYDROMORPHONE HCL IN WATER/PF 6 MG/30 ML
0.2 PATIENT CONTROLLED ANALGESIA SYRINGE INTRAVENOUS
Status: DISCONTINUED | OUTPATIENT
Start: 2024-06-26 | End: 2024-06-29

## 2024-06-26 RX ORDER — DEXAMETHASONE SODIUM PHOSPHATE 4 MG/ML
4 INJECTION, SOLUTION INTRA-ARTICULAR; INTRALESIONAL; INTRAMUSCULAR; INTRAVENOUS; SOFT TISSUE
Status: DISCONTINUED | OUTPATIENT
Start: 2024-06-26 | End: 2024-06-26 | Stop reason: HOSPADM

## 2024-06-26 RX ORDER — FENTANYL CITRATE 50 UG/ML
INJECTION, SOLUTION INTRAMUSCULAR; INTRAVENOUS PRN
Status: DISCONTINUED | OUTPATIENT
Start: 2024-06-26 | End: 2024-06-26

## 2024-06-26 RX ORDER — ENOXAPARIN SODIUM 100 MG/ML
40 INJECTION SUBCUTANEOUS EVERY 24 HOURS
Status: DISCONTINUED | OUTPATIENT
Start: 2024-06-28 | End: 2024-07-02 | Stop reason: HOSPADM

## 2024-06-26 RX ADMIN — HYDROMORPHONE HYDROCHLORIDE 0.5 MG: 1 INJECTION, SOLUTION INTRAMUSCULAR; INTRAVENOUS; SUBCUTANEOUS at 13:48

## 2024-06-26 RX ADMIN — FENTANYL CITRATE 50 MCG: 50 INJECTION INTRAMUSCULAR; INTRAVENOUS at 13:25

## 2024-06-26 RX ADMIN — PROPOFOL 150 MG: 10 INJECTION, EMULSION INTRAVENOUS at 12:47

## 2024-06-26 RX ADMIN — FENTANYL CITRATE 50 MCG: 50 INJECTION INTRAMUSCULAR; INTRAVENOUS at 12:47

## 2024-06-26 RX ADMIN — GABAPENTIN 900 MG: 300 CAPSULE ORAL at 21:20

## 2024-06-26 RX ADMIN — OXYCODONE HYDROCHLORIDE 10 MG: 5 TABLET ORAL at 19:00

## 2024-06-26 RX ADMIN — Medication 20 MCG/MIN: at 12:47

## 2024-06-26 RX ADMIN — DEXAMETHASONE SODIUM PHOSPHATE 10 MG: 10 INJECTION, SOLUTION INTRAMUSCULAR; INTRAVENOUS at 12:57

## 2024-06-26 RX ADMIN — Medication 100 MG: at 12:47

## 2024-06-26 RX ADMIN — SENNOSIDES AND DOCUSATE SODIUM 1 TABLET: 50; 8.6 TABLET ORAL at 21:20

## 2024-06-26 RX ADMIN — PROPOFOL 40 MG: 10 INJECTION, EMULSION INTRAVENOUS at 13:31

## 2024-06-26 RX ADMIN — HYDROMORPHONE HYDROCHLORIDE 0.4 MG: 0.2 INJECTION, SOLUTION INTRAMUSCULAR; INTRAVENOUS; SUBCUTANEOUS at 21:59

## 2024-06-26 RX ADMIN — SODIUM CHLORIDE, POTASSIUM CHLORIDE, SODIUM LACTATE AND CALCIUM CHLORIDE: 600; 310; 30; 20 INJECTION, SOLUTION INTRAVENOUS at 12:27

## 2024-06-26 RX ADMIN — KETAMINE HYDROCHLORIDE 10 MG: 10 INJECTION INTRAMUSCULAR; INTRAVENOUS at 13:51

## 2024-06-26 RX ADMIN — ACETAMINOPHEN 975 MG: 325 TABLET ORAL at 21:19

## 2024-06-26 RX ADMIN — HYDROMORPHONE HYDROCHLORIDE 0.2 MG: 0.2 INJECTION, SOLUTION INTRAMUSCULAR; INTRAVENOUS; SUBCUTANEOUS at 17:08

## 2024-06-26 RX ADMIN — HYDROMORPHONE HYDROCHLORIDE 0.5 MG: 1 INJECTION, SOLUTION INTRAMUSCULAR; INTRAVENOUS; SUBCUTANEOUS at 12:57

## 2024-06-26 RX ADMIN — PROPOFOL 140 MCG/KG/MIN: 10 INJECTION, EMULSION INTRAVENOUS at 12:47

## 2024-06-26 RX ADMIN — ONDANSETRON 4 MG: 2 INJECTION INTRAMUSCULAR; INTRAVENOUS at 15:39

## 2024-06-26 RX ADMIN — PHENYLEPHRINE HYDROCHLORIDE 50 MCG: 10 INJECTION INTRAVENOUS at 14:00

## 2024-06-26 RX ADMIN — DEXMEDETOMIDINE HYDROCHLORIDE 0.5 MCG/KG/HR: 400 INJECTION INTRAVENOUS at 12:49

## 2024-06-26 RX ADMIN — KETAMINE HYDROCHLORIDE 30 MG: 10 INJECTION INTRAMUSCULAR; INTRAVENOUS at 12:47

## 2024-06-26 RX ADMIN — SODIUM CHLORIDE: 9 INJECTION, SOLUTION INTRAVENOUS at 19:10

## 2024-06-26 RX ADMIN — Medication 2 G: at 12:55

## 2024-06-26 RX ADMIN — ACETAMINOPHEN 975 MG: 325 TABLET ORAL at 12:27

## 2024-06-26 RX ADMIN — KETAMINE HYDROCHLORIDE 10 MG: 10 INJECTION INTRAMUSCULAR; INTRAVENOUS at 14:50

## 2024-06-26 RX ADMIN — CEFAZOLIN 1 G: 1 INJECTION, POWDER, FOR SOLUTION INTRAMUSCULAR; INTRAVENOUS at 21:20

## 2024-06-26 RX ADMIN — PHENYLEPHRINE HYDROCHLORIDE 100 MCG: 10 INJECTION INTRAVENOUS at 13:37

## 2024-06-26 ASSESSMENT — LIFESTYLE VARIABLES: TOBACCO_USE: 1

## 2024-06-26 ASSESSMENT — ACTIVITIES OF DAILY LIVING (ADL)
ADLS_ACUITY_SCORE: 30
ADLS_ACUITY_SCORE: 30
ADLS_ACUITY_SCORE: 34
ADLS_ACUITY_SCORE: 30
ADLS_ACUITY_SCORE: 30
ADLS_ACUITY_SCORE: 34
ADLS_ACUITY_SCORE: 30
ADLS_ACUITY_SCORE: 30
ADLS_ACUITY_SCORE: 34
ADLS_ACUITY_SCORE: 30
ADLS_ACUITY_SCORE: 34
ADLS_ACUITY_SCORE: 30

## 2024-06-26 NOTE — PHARMACY-ADMISSION MEDICATION HISTORY
Pharmacist Admission Medication History    Admission medication history is complete. The information provided in this note is only as accurate as the sources available at the time of the update.    Information Source(s): Patient and CareEverywhere/SureScripts via in-person    Allergies reviewed with patient and updates made in EHR: yes    Medication History Completed By: Rose Canada RPH 6/26/2024 12:09 PM    PTA Med List   Medication Sig Last Dose    atorvastatin (LIPITOR) 20 MG tablet Take 1 tablet (20 mg) by mouth daily 6/26/2024 at 0600    gabapentin (NEURONTIN) 300 MG capsule Take 3 capsules by mouth 3 times a day. 6/26/2024 at 0600

## 2024-06-26 NOTE — ANESTHESIA POSTPROCEDURE EVALUATION
Patient: Jesika Paez    Procedure: Procedure(s):  Right open door laminoplasty at cervical 5-cervical 6 with bilateral foraminotomies       Anesthesia Type:  General    Note:  Disposition: Admission   Postop Pain Control: Uneventful            Sign Out: Well controlled pain   PONV: No   Neuro/Psych: Uneventful            Sign Out: Acceptable/Baseline neuro status   Airway/Respiratory: Uneventful            Sign Out: Acceptable/Baseline resp. status   CV/Hemodynamics: Uneventful            Sign Out: Acceptable CV status; No obvious hypovolemia; No obvious fluid overload   Other NRE: NONE   DID A NON-ROUTINE EVENT OCCUR? No           Last vitals:  Vitals Value Taken Time   /60 06/26/24 1730   Temp 36.3  C (97.4  F) 06/26/24 1730   Pulse 54 06/26/24 1738   Resp 34 06/26/24 1734   SpO2 94 % 06/26/24 1738   Vitals shown include unfiled device data.    Electronically Signed By: Pilar Valenzuela MD  June 26, 2024  6:09 PM

## 2024-06-26 NOTE — INTERVAL H&P NOTE
"I have reviewed the surgical (or preoperative) H&P that is linked to this encounter, and examined the patient. There are no significant changes    Clinical Conditions Present on Arrival:  Clinically Significant Risk Factors Present on Admission                      # Overweight: Estimated body mass index is 27.85 kg/m  as calculated from the following:    Height as of 6/20/24: 5' 3\" (1.6 m).    Weight as of this encounter: 157 lb 3.2 oz (71.3 kg).       "

## 2024-06-26 NOTE — ANESTHESIA PREPROCEDURE EVALUATION
Anesthesia Pre-Procedure Evaluation    Patient: Jesika Paez   MRN: [id number] : 1945        Procedure : Procedure(s):  Right open door laminoplasty at cervical 5-cervical 6 with bilateral foraminotomies          Past Medical History:   Diagnosis Date    Cervical radiculopathy     HLD (hyperlipidemia)     Macular degeneration (senile) of retina     Osteoporosis       Past Surgical History:   Procedure Laterality Date    ANKLE FRACTURE SURGERY      CHOLECYSTECTOMY      FRACTURE SURGERY      HC OPEN TX BIMALLEOLAR ANKLE FX, INCL INTERNAL FIXATION      Description: Open Treatment Of Bimalleolar Ankle Fracture;  Recorded: 2010;    HC REMOVAL GALLBLADDER      Description: Cholecystectomy;  Recorded: 2010;    HC REMOVE TONSILS/ADENOIDS,<11 Y/O      Description: Tonsillectomy With Adenoidectomy;  Recorded: 2010;    HYSTERECTOMY      [facility] ARVIZU W/O FACETEC FORAMOT/DSKC  VRT SEG, LUMBAR      Description: Laminectomy Decompressive Up To Two Lumbar Segments;  Recorded: 2010;  Comments: X2 SEPARATE BACK SURGERIES    [facility] LIGATE FALLOPIAN TUBE      Description: Tubal Ligation;  Recorded: 2010;    [facility] TOTAL ABDOM HYSTERECTOMY      Description: Total Abdominal Hysterectomy;  Recorded: 2010;    [facility] TOTAL ABDOM HYSTERECTOMY      Description: Hysterectomy;  Recorded: 2010;      Allergies   Allergen Reactions    Codeine Sulfate [Codeine] Unknown      Social History     Tobacco Use    Smoking status: Former     Current packs/day: 0.00     Average packs/day: 1 pack/day for 30.1 years (30.1 ttl pk-yrs)     Types: Cigarettes     Start date: 1982     Quit date: 2012     Years since quittin.4    Smokeless tobacco: Never   Substance Use Topics    Alcohol use: No      Wt Readings from Last 1 Encounters:   24 71.3 kg (157 lb 3.2 oz)        Anesthesia Evaluation   Pt has had prior anesthetic.     No history of anesthetic complications       ROS/MED HX  ENT/Pulmonary:    "  (+)                tobacco use, Past use,                       Neurologic: Comment: Cervical radiculopathy and stenosis    (+)    peripheral neuropathy,                            Cardiovascular:     (+) Dyslipidemia - -   -  - -                                      METS/Exercise Tolerance: 4 - Raking leaves, gardening    Hematologic:  - neg hematologic  ROS     Musculoskeletal:  - neg musculoskeletal ROS     GI/Hepatic:  - neg GI/hepatic ROS     Renal/Genitourinary:  - neg Renal ROS     Endo:  - neg endo ROS     Psychiatric/Substance Use:  - neg psychiatric ROS     Infectious Disease:       Malignancy:       Other:            Physical Exam    Airway        Mallampati: II   TM distance: > 3 FB   Neck ROM: full   Mouth opening: > 3 cm    Respiratory Devices and Support         Dental       (+) Edentulous      Cardiovascular          Rhythm and rate: regular     Pulmonary           breath sounds clear to auscultation           OUTSIDE LABS:  CBC:   Lab Results   Component Value Date    WBC 7.9 06/20/2024    WBC 5.7 05/20/2024    HGB 13.6 06/20/2024    HGB 13.0 05/20/2024    HCT 40.6 06/20/2024    HCT 38.7 05/20/2024     06/20/2024     05/20/2024     BMP:   Lab Results   Component Value Date     06/20/2024     05/20/2024    POTASSIUM 5.0 06/20/2024    POTASSIUM 4.9 05/20/2024    CHLORIDE 106 06/20/2024    CHLORIDE 105 05/20/2024    CO2 28 06/20/2024    CO2 27 05/20/2024    BUN 11.6 06/20/2024    BUN 9.3 05/20/2024    CR 0.74 06/20/2024    CR 0.73 05/20/2024    GLC 95 06/26/2024     (H) 06/20/2024     COAGS: No results found for: \"PTT\", \"INR\", \"FIBR\"  POC: No results found for: \"BGM\", \"HCG\", \"HCGS\"  HEPATIC:   Lab Results   Component Value Date    ALBUMIN 4.8 07/18/2023    PROTTOTAL 7.2 07/18/2023    ALT 29 07/18/2023    AST 36 07/18/2023    ALKPHOS 42 07/18/2023    BILITOTAL 1.4 (H) 07/18/2023     OTHER:   Lab Results   Component Value Date    JANET 9.6 06/20/2024    PHOS 4.0 " "07/19/2022    TSH 1.35 07/19/2022    SED 10 07/18/2023       Anesthesia Plan    ASA Status:  2    NPO Status:  NPO Appropriate    Anesthesia Type: General.     - Airway: ETT   Induction: Intravenous, Propofol.   Maintenance: TIVA.   Techniques and Equipment:     - Lines/Monitors: 2nd IV     Consents    Anesthesia Plan(s) and associated risks, benefits, and realistic alternatives discussed. Questions answered and patient/representative(s) expressed understanding.     - Discussed:     - Discussed with:  Patient            Postoperative Care    Pain management: Multi-modal analgesia.   PONV prophylaxis: Ondansetron (or other 5HT-3), Dexamethasone or Solumedrol     Comments:    Other Comments:     2 NIBP cuffs             Pilar Valenzuela MD                # Overweight: Estimated body mass index is 27.85 kg/m  as calculated from the following:    Height as of 6/20/24: 1.6 m (5' 3\").    Weight as of this encounter: 71.3 kg (157 lb 3.2 oz).      "

## 2024-06-26 NOTE — ANESTHESIA CARE TRANSFER NOTE
Patient: Jesika Paez    Procedure: Procedure(s):  Right open door laminoplasty at cervical 5-cervical 6 with bilateral foraminotomies       Diagnosis: Cervical radiculopathy [M54.12]  Cervical stenosis of spinal canal [M48.02]  Cervical spondylosis with myelopathy [M47.12]  Diagnosis Additional Information: No value filed.    Anesthesia Type:   General     Note:    Oropharynx: oral airway in place  Level of Consciousness: drowsy  Oxygen Supplementation: face mask  Level of Supplemental Oxygen (L/min / FiO2): 6  Independent Airway: airway patency satisfactory and stable  Dentition: dentition unchanged  Vital Signs Stable: post-procedure vital signs reviewed and stable  Report to RN Given: handoff report given  Patient transferred to: PACU    Handoff Report: Identifed the Patient, Identified the Reponsible Provider, Reviewed the pertinent medical history, Discussed the surgical course, Reviewed Intra-OP anesthesia mangement and issues during anesthesia, Set expectations for post-procedure period and Allowed opportunity for questions and acknowledgement of understanding      Vitals:  Vitals Value Taken Time   /81 06/26/24 1628   Temp 97.2    Pulse 70 06/26/24 1629   Resp 15 06/26/24 1629   SpO2 95 % 06/26/24 1629   Vitals shown include unfiled device data.    Electronically Signed By: HILL Lynn CRNA  June 26, 2024  4:30 PM

## 2024-06-26 NOTE — PROGRESS NOTES
Neurosurgery progress note:     POD0 Right open door laminoplasty at cervical 5-cervical 6 with bilateral foraminotomies     Plan:   -Admit  -XR in am  -HOB greater than 30, OK to be up with assist but do NOT have head of bed flat or less than 30  -Cervical collar at ALL times, may remove 2x daily for skin checks   -Drain x 1, monitor output. Drain to gravity ONLY, NO suction.   -Abx x 3 doses  -Pain control  -Wound cares  -PT/OT/hospitalist consulted     Discussed with Dr. Jim Lua PA-C  [facility]  [address] [place] [address] [phone]

## 2024-06-26 NOTE — ANESTHESIA PROCEDURE NOTES
Airway         Procedure Start/Stop Times: 6/26/2024 12:50 PM  Staff -        CRNA: Vahid Leos APRN CRNA       Performed By: CRNAIndications and Patient Condition       Indications for airway management: medardo-procedural       Induction type:intravenous       Mask difficulty assessment: 1 - vent by mask    Final Airway Details       Final airway type: endotracheal airway       Successful airway: ETT - single  Endotracheal Airway Details        ETT size (mm): 7.0       Cuffed: yes       Cuff volume (mL): 5       Successful intubation technique: direct laryngoscopy and video laryngoscopy       VL Blade Size: MAC 3       Grade View of Cords: 1       Adjucts: stylet       Measured from: lips    Post intubation assessment        Placement verified by: capnometry, equal breath sounds and chest rise        Number of attempts at approach: 1       Secured with: tape       Ease of procedure: easy       Dentition: Intact and Unchanged       Dental guard used and removed. Dental Guard Type: Standard White.    Medication(s) Administered   Medication Administration Time: 6/26/2024 12:50 PM

## 2024-06-27 ENCOUNTER — APPOINTMENT (OUTPATIENT)
Dept: OCCUPATIONAL THERAPY | Facility: HOSPITAL | Age: 79
DRG: 519 | End: 2024-06-27
Attending: SURGERY
Payer: COMMERCIAL

## 2024-06-27 ENCOUNTER — APPOINTMENT (OUTPATIENT)
Dept: RADIOLOGY | Facility: HOSPITAL | Age: 79
DRG: 519 | End: 2024-06-27
Payer: COMMERCIAL

## 2024-06-27 ENCOUNTER — APPOINTMENT (OUTPATIENT)
Dept: PHYSICAL THERAPY | Facility: HOSPITAL | Age: 79
DRG: 519 | End: 2024-06-27
Attending: SURGERY
Payer: COMMERCIAL

## 2024-06-27 ENCOUNTER — APPOINTMENT (OUTPATIENT)
Dept: RADIOLOGY | Facility: HOSPITAL | Age: 79
DRG: 519 | End: 2024-06-27
Attending: INTERNAL MEDICINE
Payer: COMMERCIAL

## 2024-06-27 LAB
ANION GAP SERPL CALCULATED.3IONS-SCNC: 9 MMOL/L (ref 7–15)
BUN SERPL-MCNC: 13 MG/DL (ref 8–23)
CALCIUM SERPL-MCNC: 8.5 MG/DL (ref 8.8–10.2)
CHLORIDE SERPL-SCNC: 105 MMOL/L (ref 98–107)
CREAT SERPL-MCNC: 0.7 MG/DL (ref 0.51–0.95)
DEPRECATED HCO3 PLAS-SCNC: 25 MMOL/L (ref 22–29)
EGFRCR SERPLBLD CKD-EPI 2021: 88 ML/MIN/1.73M2
ERYTHROCYTE [DISTWIDTH] IN BLOOD BY AUTOMATED COUNT: 12.2 % (ref 10–15)
GLUCOSE SERPL-MCNC: 131 MG/DL (ref 70–99)
HCT VFR BLD AUTO: 38.3 % (ref 35–47)
HGB BLD-MCNC: 12.9 G/DL (ref 11.7–15.7)
MCH RBC QN AUTO: 32.3 PG (ref 26.5–33)
MCHC RBC AUTO-ENTMCNC: 33.7 G/DL (ref 31.5–36.5)
MCV RBC AUTO: 96 FL (ref 78–100)
PLATELET # BLD AUTO: 200 10E3/UL (ref 150–450)
POTASSIUM SERPL-SCNC: 4.7 MMOL/L (ref 3.4–5.3)
RBC # BLD AUTO: 3.99 10E6/UL (ref 3.8–5.2)
SODIUM SERPL-SCNC: 139 MMOL/L (ref 135–145)
WBC # BLD AUTO: 11.2 10E3/UL (ref 4–11)

## 2024-06-27 PROCEDURE — 99232 SBSQ HOSP IP/OBS MODERATE 35: CPT | Performed by: INTERNAL MEDICINE

## 2024-06-27 PROCEDURE — 36415 COLL VENOUS BLD VENIPUNCTURE: CPT | Performed by: INTERNAL MEDICINE

## 2024-06-27 PROCEDURE — 120N000001 HC R&B MED SURG/OB

## 2024-06-27 PROCEDURE — 97166 OT EVAL MOD COMPLEX 45 MIN: CPT | Mod: GO

## 2024-06-27 PROCEDURE — 250N000011 HC RX IP 250 OP 636: Mod: JZ

## 2024-06-27 PROCEDURE — 97161 PT EVAL LOW COMPLEX 20 MIN: CPT | Mod: GP

## 2024-06-27 PROCEDURE — 250N000013 HC RX MED GY IP 250 OP 250 PS 637

## 2024-06-27 PROCEDURE — 97530 THERAPEUTIC ACTIVITIES: CPT | Mod: GP

## 2024-06-27 PROCEDURE — 72040 X-RAY EXAM NECK SPINE 2-3 VW: CPT

## 2024-06-27 PROCEDURE — 250N000011 HC RX IP 250 OP 636: Mod: JZ | Performed by: INTERNAL MEDICINE

## 2024-06-27 PROCEDURE — 97535 SELF CARE MNGMENT TRAINING: CPT | Mod: GO

## 2024-06-27 PROCEDURE — 71045 X-RAY EXAM CHEST 1 VIEW: CPT

## 2024-06-27 PROCEDURE — 80048 BASIC METABOLIC PNL TOTAL CA: CPT | Performed by: INTERNAL MEDICINE

## 2024-06-27 PROCEDURE — 250N000013 HC RX MED GY IP 250 OP 250 PS 637: Performed by: PHYSICIAN ASSISTANT

## 2024-06-27 PROCEDURE — 97110 THERAPEUTIC EXERCISES: CPT | Mod: GP

## 2024-06-27 PROCEDURE — 97116 GAIT TRAINING THERAPY: CPT | Mod: GP

## 2024-06-27 PROCEDURE — 85014 HEMATOCRIT: CPT | Performed by: INTERNAL MEDICINE

## 2024-06-27 RX ORDER — METHOCARBAMOL 500 MG/1
250 TABLET ORAL EVERY 6 HOURS
Status: DISCONTINUED | OUTPATIENT
Start: 2024-06-27 | End: 2024-07-02 | Stop reason: HOSPADM

## 2024-06-27 RX ADMIN — CEFAZOLIN 1 G: 1 INJECTION, POWDER, FOR SOLUTION INTRAMUSCULAR; INTRAVENOUS at 12:15

## 2024-06-27 RX ADMIN — GABAPENTIN 900 MG: 300 CAPSULE ORAL at 08:21

## 2024-06-27 RX ADMIN — ATORVASTATIN CALCIUM 20 MG: 10 TABLET, FILM COATED ORAL at 08:21

## 2024-06-27 RX ADMIN — ACETAMINOPHEN 975 MG: 325 TABLET ORAL at 21:37

## 2024-06-27 RX ADMIN — GABAPENTIN 900 MG: 300 CAPSULE ORAL at 21:38

## 2024-06-27 RX ADMIN — METHOCARBAMOL 250 MG: 500 TABLET ORAL at 11:24

## 2024-06-27 RX ADMIN — HYDRALAZINE HYDROCHLORIDE 10 MG: 20 INJECTION INTRAMUSCULAR; INTRAVENOUS at 03:17

## 2024-06-27 RX ADMIN — SENNOSIDES AND DOCUSATE SODIUM 1 TABLET: 50; 8.6 TABLET ORAL at 21:38

## 2024-06-27 RX ADMIN — POLYETHYLENE GLYCOL 3350 17 G: 17 POWDER, FOR SOLUTION ORAL at 08:21

## 2024-06-27 RX ADMIN — ACETAMINOPHEN 975 MG: 325 TABLET ORAL at 13:55

## 2024-06-27 RX ADMIN — THERA TABS 1 TABLET: TAB at 08:22

## 2024-06-27 RX ADMIN — HYDROMORPHONE HYDROCHLORIDE 0.4 MG: 0.2 INJECTION, SOLUTION INTRAMUSCULAR; INTRAVENOUS; SUBCUTANEOUS at 07:09

## 2024-06-27 RX ADMIN — ACETAMINOPHEN 975 MG: 325 TABLET ORAL at 06:08

## 2024-06-27 RX ADMIN — OXYCODONE HYDROCHLORIDE 10 MG: 5 TABLET ORAL at 02:48

## 2024-06-27 RX ADMIN — OXYCODONE HYDROCHLORIDE 10 MG: 5 TABLET ORAL at 08:22

## 2024-06-27 RX ADMIN — GABAPENTIN 900 MG: 300 CAPSULE ORAL at 13:55

## 2024-06-27 RX ADMIN — SENNOSIDES AND DOCUSATE SODIUM 1 TABLET: 50; 8.6 TABLET ORAL at 08:21

## 2024-06-27 RX ADMIN — OXYCODONE HYDROCHLORIDE 10 MG: 5 TABLET ORAL at 12:56

## 2024-06-27 RX ADMIN — CEFAZOLIN 1 G: 1 INJECTION, POWDER, FOR SOLUTION INTRAMUSCULAR; INTRAVENOUS at 06:08

## 2024-06-27 ASSESSMENT — ACTIVITIES OF DAILY LIVING (ADL)
ADLS_ACUITY_SCORE: 32
ADLS_ACUITY_SCORE: 34
ADLS_ACUITY_SCORE: 32
ADLS_ACUITY_SCORE: 34
ADLS_ACUITY_SCORE: 32
ADLS_ACUITY_SCORE: 34
DEPENDENT_IADLS:: TRANSPORTATION
ADLS_ACUITY_SCORE: 34
ADLS_ACUITY_SCORE: 34

## 2024-06-27 NOTE — PROGRESS NOTES
06/27/24 0859   Appointment Info   Signing Clinician's Name / Credentials (OT) Jenni Hatfield OTD OTR/L   Living Environment   People in Home alone   Current Living Arrangements apartment   Home Accessibility no concerns   Living Environment Comments Walk in shower with grab bars and shower, toilet with grab bars   Self-Care   Usual Activity Tolerance good   Current Activity Tolerance moderate   Equipment Currently Used at Home none   Fall history within last six months yes   Number of times patient has fallen within last six months 1   Activity/Exercise/Self-Care Comment Typically ind with all ADLs and IADLs - grocery delivery, cleaning service 1x/month   General Information   Onset of Illness/Injury or Date of Surgery 06/26/24   Referring Physician Karishma Frenandez MD   Patient/Family Therapy Goal Statement (OT) To go to TCU, to heal safely   Additional Occupational Profile Info/Pertinent History of Current Problem POD0 Right open door laminoplasty at cervical 5-cervical 6 with bilateral foraminotomies   Existing Precautions/Restrictions spinal;brace worn when out of bed;fall  (cervical collar at all times, HOB above 30 degrees)   Cognitive Status Examination   Orientation Status orientation to person, place and time   Affect/Mental Status (Cognitive) WFL   Follows Commands WFL   Visual Perception   Visual Impairment/Limitations corrective lenses for reading   Posture   Posture not impaired   Range of Motion Comprehensive   General Range of Motion bilateral upper extremity ROM WFL   Strength Comprehensive (MMT)   General Manual Muscle Testing (MMT) Assessment no strength deficits identified   Comment, General Manual Muscle Testing (MMT) Assessment Not formally assessed d/t spinal precautions   Bed Mobility   Bed Mobility supine-sit   Supine-Sit [place] (Bed Mobility) minimum assist (75% patient effort)   Transfers   Transfers sit-stand transfer;toilet transfer   Sit-Stand Transfer   Sit-Stand [place]  (Transfers) contact guard   Toilet Transfer   [place] Level (Toilet Transfer) contact guard   Balance   Balance Assessment standing dynamic balance   Standing Balance: Dynamic contact guard   Activities of Daily Living   BADL Assessment/Intervention lower body dressing;toileting;grooming   Lower Body Dressing Assessment/Training   [place] Level (Lower Body Dressing) minimum assist (75% patient effort)   Grooming Assessment/Training   [place] Level (Grooming) minimum assist (75% patient effort)   Toileting   [place] Level (Toileting) minimum assist (75% patient effort)   Clinical Impression   Criteria for Skilled Therapeutic Interventions Met (OT) Yes, treatment indicated   OT Diagnosis Decreased ind with ADLs and safety   Influenced by the following impairments S/p cervical laminotomy   Assessment of Occupational Performance 3-5 Performance Deficits   Identified Performance Deficits dressing, toileting, bathing, fxl transfers, home mgmt   Planned Therapy Interventions (OT) ADL retraining;bed mobility training;IADL retraining;transfer training;progressive activity/exercise;risk factor education   Clinical Decision Making Complexity (OT) detailed assessment/moderate complexity   Risk & Benefits of therapy have been explained evaluation/treatment results reviewed;care plan/treatment goals reviewed;risks/benefits reviewed;current/potential barriers reviewed;patient   OT Total Evaluation Time   OT Eval, Moderate Complexity Minutes (69348) 8   OT Goals   Therapy Frequency (OT) 2 times/day   OT Predicted Duration/Target Date for Goal Attainment 07/04/24   OT Goals Lower Body Dressing;Toilet Transfer/Toileting;Hygiene/Grooming;Transfers   OT: Hygiene/Grooming modified independent;while standing   OT: Lower Body Dressing Modified independent;within precautions;using adaptive equipment   OT: Transfer Modified independent  (walk in shower transfer)   OT: Toilet Transfer/Toileting Modified independent;toilet  transfer;cleaning and garment management;within precautions   Self-Care/Home Management   Self-Care/Home Mgmt/ADL, Compensatory, Meal Prep Minutes (07473) 24   Symptoms Noted During/After Treatment (Meal Preparation/Planning Training) none   Treatment Detail/Skilled Intervention Eval completed, treatment initiated. Educated on spinal precautions, brace wear time and donning/doffing. Pt and son asking many relevant questions regarding MJ collar vs. shower collar, demos understanding. Instructed on log roll technique, min A at trunk to assist to upright position. Fxl mob to bathroom CGA-min A hand hold assist, min cueing for use of LE to gauge safety with transfers, cueing for hand placement. Returned to bedside chair, reviewed LB dressing techniques within figure 4, demos understanding - to trial in PM. Additional STS transfers CGA to adjust linens and set up chair. Call light in reach, all needs met.   OT Discharge Planning   OT Plan Lb dressing, toileting, shower transfer, progress mobility/transfers   OT Discharge Recommendation (DC Rec) Transitional Care Facility   OT Rationale for DC Rec Pt lives alone, does not have consistent assist at home from family, will likely need short TCU stay to progress ind/safety with spinal precautions   OT Brief overview of current status CGA-min A fxl mob no AD, min A bed mob   Total Session Time   Timed Code Treatment Minutes 24   Total Session Time (sum of timed and untimed services) 32

## 2024-06-27 NOTE — PROGRESS NOTES
Pt states to writer that after muscle relaxer, pt was hallucinating a little. Pt states that this was not as bad as the last time she took the med,however, pt asking if we can stop the med. Writer was able to place med on hold and reassess in morning

## 2024-06-27 NOTE — PROGRESS NOTES
"Care Management Follow Up    Length of Stay (days): 1    Expected Discharge Date: 06/28/2024     Concerns to be Addressed: Care progression - discharge planning     Patient plan of care discussed at interdisciplinary rounds: Yes    Anticipated Discharge Disposition:  Transitional Care     Anticipated Discharge Services:  Transitional Care  Anticipated Discharge DME:  NA    Patient/family educated on Medicare website which has current facility and service quality ratings:  NA  Education Provided on the Discharge Plan:  Yes per team  Patient/Family in Agreement with the Plan:  Yes    Referrals Placed by CM/SW:  Yes  Private pay costs discussed: Not applicable    Additional Information:  Met with patient at bedside to discuss the PT discharge rec for Transitional Care. Patient is in agreement. Patient will look through the list and update RNCM  Writer provided a list of local skilled nursing facilities [facility] (which includes the medicare.gov website) for patient and family to review.     Social Hx: \"Live alone in an apartment. Report independent w/ADLs, but needs assistance w/IADLs. Has walking stick and standard walker. No community resources. Has [employer]. Goal is to return home. Anticipate family will transport.\"    RNCM to follow for medical progression, recommendations, and final discharge plan.     Astrid Jackson RN     "

## 2024-06-27 NOTE — PLAN OF CARE
Goal Outcome Evaluation:      Plan of Care Reviewed With: patient, family    Overall Patient Progress: improvingOverall Patient Progress: improving    Problem: Adult Inpatient Plan of Care  Goal: Absence of Hospital-Acquired Illness or Injury  Intervention: Prevent Skin Injury  Recent Flowsheet Documentation  Taken 6/26/2024 1800 by Tena Wei, RN  Body Position: turned  Device Skin Pressure Protection: (cervical collar in place at all times) --   Skin is intact. Cervical collar in place at all times, head of the bed is currently at 33 degrees.     Problem: Risk for Delirium  Goal: Improved Attention and Thought Clarity  Outcome: Progressing   When patient first got to the floor around 1800, she was awake but drowsy. Her voice was very quiet, hard to understand but did manage to say her name and ask for water. She was also able to say her son's name of Jagdish. As she shift has gone on, around 1845 she was able to open her eyes spontaneously, voice was stronger and words were more clear. She recognized her son in the room, she was able to state she had a lot of pain 10/10. She would not answer orientation questions, only would answer to her first name.  By 1900, she had her eyes open and she was talking a bit more, words were few but more clear.     Problem: Spinal Surgery  Goal: Absence of Bleeding  Outcome: Progressing   EUGENE drain in place, to gravity only, only a small amount of serosanguinous drainage so far.     Problem: Spinal Surgery  Goal: Fluid and Electrolyte Balance  Outcome: Progressing   Has been able to take sips of water from a spoon or directly from the cup with assistance and without difficulty swallowing (no coughing or choking at all from the sips). Prn oxycodone crushed and put in water, she was able to swallow without difficulty. Will encourage more fluid as she wakes up.    Problem: Spinal Surgery  Goal: Optimal Functional Ability  Outcome: Progressing  Intervention: Optimize Functional  "Status  Recent Flowsheet Documentation  Taken 6/26/2024 1830 by Tena Wei, RN  Activity Management: activity adjusted per tolerance  Taken 6/26/2024 1800 by Tena Wei, RN  Activity Management: activity adjusted per tolerance   When she first arrived to the floor, she would barely move her arms, hand  were very weak, when her arms were moved for her, she would moan in pain and quietly say \"hurt\". She was able to wiggle all toes.  About 1845, as she was more awake, her hand  were still weak but slightly stronger compared to when she first arrived. She was able to hold her arms up off of the bed slightly in a bent position only for a short period of time. She did c/o pain with any movement of her arms or hands and did say \"painful\" when her arms were moved for her. Her son Jagdish is present in the room and was encouraging her to follow command, she was able to do so.  She did not answer if she had any numbness or tingling in any extremity, so unable to assess.    Problem: Spinal Surgery  Goal: Optimal Pain Control and Function  Outcome: Progressing   After discussion with the pharmacist via telephone, since patient was c/o pain 10/10, order was followed to administer oxycodone 10mg per order. This medication was crushed and placed on spoon with water. Patient educated on the medication reason, agreed and swallowed without difficulty, son Jagdish present in the room.     Problem: Spinal Surgery  Goal: Effective Oxygenation and Ventilation  Outcome: Progressing  Intervention: Optimize Oxygenation and Ventilation  Recent Flowsheet Documentation  Taken 6/26/2024 1800 by Tena Wei, RN  Head of Bed (HOB) Positioning: HOB at 30-45 degrees  She has followed command to cough slightly.  Continuous pulse oximeter placed on patient since arrival, SAT's have stayed above 95% on 2 liters of oxygen via nasal cannula. Capnography has now been placed in the room, patient will be monitored that " way from now on.

## 2024-06-27 NOTE — PROGRESS NOTES
06/27/24 1100   Appointment Info   Signing Clinician's Name / Credentials (PT) JOSE DE JESUS [place]am[place]   Living Environment   People in Home alone   Current Living Arrangements apartment   Home Accessibility stairs to enter home;no concerns  (Pt has an elevator, does stairs for exercise.)   Number of Stairs, Main Entrance greater than 10 stairs   Stair Railings, Main Entrance railings on both sides of stairs   Living Environment Comments Lives on 2nd floor of apartment, does have elevator access, pt reports to use stairs more often.   Self-Care   Usual Activity Tolerance good   Current Activity Tolerance good   Regular Exercise Yes   Activity/Exercise Type walking   Exercise Amount/Frequency 30 mins;daily   Equipment Currently Used at Home other (see comments)  (Walking stick)   Fall history within last six months yes   Number of times patient has fallen within last six months 1   Activity/Exercise/Self-Care Comment Ind with all ADLs/IADLs at home, recieves home assistance with cleaning and laundry.   General Information   Onset of Illness/Injury or Date of Surgery 06/26/24   Referring Physician Paulette Lua PA-C   Patient/Family Therapy Goals Statement (PT) None stated by patient.   Pertinent History of Current Problem (include personal factors and/or comorbidities that impact the POC) 78-year-old female with history of intractable neck pain, cervical radiculopathy and cervical stenosis, macular degeneration, osteoporosis, hyperlipidemia presents for laminoplasty at C5/C6 with bilateral foraminotomies.   Existing Precautions/Restrictions fall;spinal   General Observations Pt wearing C-spine collar   Cognition   Cognitive Status Comments A&Ox4   Range of Motion (ROM)   Range of Motion ROM is WFL   ROM Comment Wearing C-spine collar   Strength (Manual Muscle Testing)   Strength (Manual Muscle Testing) No deficits observed during functional mobility   Bed Mobility   Bed Mobility sit-supine   Sit-Supine  [place] (Bed Mobility) supervision;1 person assist   Assistive Device (Bed Mobility) bed rails   Transfers   Transfers sit-stand transfer   Impairments Contributing to Impaired Transfers impaired balance   Sit-Stand Transfer   Sit-Stand [place] (Transfers) contact guard;1 person assist   Assistive Device (Sit-Stand Transfers) walker, front-wheeled   Gait/Stairs (Locomotion)   [place] Level (Gait) contact guard;1 person assist   Assistive Device (Gait) walker, front-wheeled   Distance in Feet (Gait) 10'   Pattern (Gait) step-to   Deviations/Abnormal Patterns (Gait) gait speed decreased;vinicio decreased   Comment, (Gait/Stairs) Stairs not assessed   Balance   Balance other (describe)   Standing Balance: Static fair balance   Balance Comments Falling backwards onto heels with BUE support   Balance Quick Add Standing balance: static   Sensory Examination   Sensory Perception patient reports no sensory changes   Sensory Perception Comments Baseline numbness in Lt great toe and bilateral thumbs   Clinical Impression   Criteria for Skilled Therapeutic Intervention Yes, treatment indicated   PT Diagnosis (PT) Impaired mobility, transfers and ambulation   Influenced by the following impairments Impaired balance   Functional limitations due to impairments Home environment navigation   Clinical Presentation (PT Evaluation Complexity) stable   Clinical Presentation Rationale Presents as medically diagnosed   Clinical Decision Making (Complexity) low complexity   Planned Therapy Interventions (PT) balance training;bed mobility training;gait training;home exercise program;stair training;transfer training;strengthening;risk factor education;patient/family education   Risk & Benefits of therapy have been explained evaluation/treatment results reviewed;participants voiced agreement with care plan;participants included;patient   PT Total Evaluation Time   PT [place], Low Complexity Minutes (11101) 10   Physical Therapy  Goals   PT Frequency 2x/day   PT Predicted Duration/Target Date for Goal Attainment 07/04/24   PT Goals Transfers;Gait;Stairs;PT Goal 1;Bed Mobility   PT: Bed Mobility Supine to/from sit;Supervision/stand-by assist;Within precautions   PT: Transfers Supervision/stand-by assist;Sit to/from stand;Assistive device   PT: Gait Supervision/stand-by assist;Standard walker;Greater than 200 feet;Within precautions   PT: Stairs Greater than 10 stairs;Rail on both sides;Supervision/stand-by assist   PT: Goal 1 Complete balance assessment test   Interventions   Interventions Quick Adds Gait Training;Therapeutic Activity   Therapeutic Activity   Therapeutic Activities: dynamic activities to improve functional performance Minutes (10567) 8   Treatment Detail/Skilled Intervention Sit<>stand with CGA, FWW, and cues for hand sequence. LOB x2 while standing with BUE support (initially with FWW, and secondly with FWW and bed railing), Jeana to maintain standing balance. Pt reports feeling like she is falling backwards, instructed pt to weight shift feet through toes.   Gait Training   Gait Training Minutes (54162) 10   Symptoms Noted During/After Treatment (Gait Training) other (see comments)   Treatment Detail/Skilled Intervention Educated and demonstrated pt on gait pattern with FWW and safety with using AD. Pt amb with FWW and CGA. Maintains conversation with amb. Reported to feel lightheaded towards end of session. Pt in lying in bed, /61 SpO2 97%. Symptoms improved while in bed.   Distance in Feet 125'   [place] Level (Gait Training) stand-by assist   Physical Assistance Level (Gait Training) verbal cues;1 person assist   Assistive Device (Gait Training) standard walker   Pattern Analysis (Gait Training) swing-to gait   Gait Analysis Deviations decreased vinicio;decreased step length;decreased weight-shifting ability   Impairments (Gait Analysis/Training) balance impaired   PT Discharge Planning   PT Plan gait with FWW,  stairs, standing balance   PT Discharge Recommendation (DC Rec) Transitional Care Facility   PT Rationale for DC Rec Pt lives indipendently at home and requires assistance due to fall risk with mobility, transfers, and ambulation. Rec TCU for continued progress towards PLOF.   PT Brief overview of current status Amb 125' FWW and CGA. x2 LOB with standing and BUE support requiring Jeana to maintain balance. CGA sit<>stand   PT Equipment Needed at Discharge walker, standard   Total Session Time   Timed Code Treatment Minutes 18   Total Session Time (sum of timed and untimed services) 28       Direct onsite supervision of therapy and co-signature completed by Jina Lua, PT on 6/27/2024

## 2024-06-27 NOTE — PROGRESS NOTES
"[facility]    Medicine Progress Note - Hospitalist Service    Date of Admission:  6/26/2024    Assessment & Plan   78-year-old female with history of intractable neck pain, cervical radiculopathy and cervical stenosis, macular degeneration, osteoporosis, hyperlipidemia presents for laminoplasty at C5/C6 with bilateral foraminotomies.  [facility] consulted for medical management.        History of cervical radiculopathy, cervical stenosis, cervical spondylosis and myelopathy  -- Status post laminoplasty at C5/6 with bilateral foraminotomies 6/26/2024  -- Postop cares per neurosurgery  --Continue methocarbamol  --Analgesics as needed  --PT/OT  --Keep neck collar in place  --EUGENE drain management per neurosurgery service.     Peripheral neuropathy  Bilateral lower extremity numbness  --Continue PTA gabapentin 900 mg 3 times daily    Hyperlipidemia  --Continue atorvastatin 20 mg daily    Diet: Advance Diet as Tolerated: Regular Diet Adult    DVT Prophylaxis: Enoxaparin (Lovenox) SQ  Szymanski Catheter: Not present  Lines: None     Cardiac Monitoring: None  Code Status: Full Code      Clinically Significant Risk Factors                               # Overweight: Estimated body mass index is 27.85 kg/m  as calculated from the following:    Height as of 6/20/24: 1.6 m (5' 3\").    Weight as of this encounter: 71.3 kg (157 lb 3.2 oz)., PRESENT ON ADMISSION     # Financial/Environmental Concerns: none         Disposition Plan     Medically Ready for Discharge: Anticipated Tomorrow             Jaret German MD  Hospitalist Service  [facility]  Securely message with [employer] (more info)  Text page via [employer] Paging/Directory   ______________________________________________________________________    Interval History   She complains of neck pain and bilateral numbness in the toes. Lower extremities appear well-perfused. She states she has 'plates and screws' in both ankles.      Physical " Exam   Vital Signs: Temp: 98.1  F (36.7  C) Temp src: Oral BP: 112/56 Pulse: 57   Resp: 18 SpO2: 95 % O2 Device: None (Room air) Oxygen Delivery: 2 LPM  Weight: 157 lbs 3.2 oz      General appearance: Elderly woman sitting comfortably on the recliner, with neck collar in place, awake, Alert, Cooperative, not in any obvious distress and appears stated age   HEENT: Normocephalic, atraumatic, conjunctiva clear without icterus and ears without discharge neck  Neck: Neck collar with EUGENE drain in place draining bloody fluid.  Lungs: Clear to auscultation bilaterally, no wheezing, good air exchange, normal work of breathing  Cardiovascular: Regular Rate and Rythm, normal apical impulse, normal S1 and S2, no lower extremity edema bilaterally  Abdomen: Soft, non-tender and Non-distended, active bowel sounds  Skin: Skin color, texture normal and bruising or bleeding. No rashes or lesions over face, neck, arms and legs, turgor normal.  Musculoskeletal: No bony deformities or joint tenderness. Normal ROM upon flexion & extension.   Neurologic: Alert & Oriented X 3, Facial symmetry preserved and upper & lower extremities moving well with symmetry  Psychiatric: Calm, normal eye contact and normal affect      Medical Decision Making       40 MINUTES SPENT BY ME on the date of service doing chart review, history, exam, documentation & further activities per the note.      Data   Imaging results reviewed over the past 24 hrs:   No results found for this or any previous visit (from the past 24 hour(s)).

## 2024-06-27 NOTE — PROGRESS NOTES
"[facility]    Medicine Progress Note - Hospitalist Service    Date of Admission:  6/26/2024    Assessment & Plan   78-year-old female with history of intractable neck pain, cervical radiculopathy and cervical stenosis, macular degeneration, osteoporosis, hyperlipidemia presents for laminoplasty at C5/C6 with bilateral foraminotomies.  [facility] [place] consulted for medical management.      History of cervical radiculopathy, cervical stenosis, cervical spondylosis and myelopathy  -- Status post laminoplasty at C5/6 with bilateral foraminotomies 6/26/2024  -- Postop cares per neurosurgery  -- Defer home gabapentin resumption to neurosurgery      Postop respiratory insufficiency:  --Suspect related to recent anesthesia and pain control  -- Monitor for continued improvement in hypoxia overnight  -- Push I-S      History of hyperlipidemia: Continue home statin           Diet: Advance Diet as Tolerated: Regular Diet Adult    DVT Prophylaxis: Defer to primary service  Szymanski Catheter: Not present  Lines: None     Cardiac Monitoring: None  Code Status: Full Code      Clinically Significant Risk Factors Present on Admission                              # Overweight: Estimated body mass index is 27.85 kg/m  as calculated from the following:    Height as of 6/20/24: 1.6 m (5' 3\").    Weight as of this encounter: 71.3 kg (157 lb 3.2 oz).              Disposition Plan   Medically Ready for Discharge: Anticipated in 2-4 Days      Feng Mcgarry DO  Hospitalist Service  [facility]  Securely message with [employer] (more info)  Text page via [employer] Paging/Directory   ______________________________________________________________________    Interval History   NAD. Denies any complaints, pain controlled    Physical Exam   Vital Signs: Temp: 97.7  F (36.5  C) Temp src: Oral BP: (!) 140/86 Pulse: 72   Resp: 22 SpO2: 99 % O2 Device: Nasal cannula Oxygen Delivery: 2 LPM  Weight: 157 lbs 3.2 " oz  General: NAD  RESPIRATORY: Breathing nonlabored  CARDIOVASCULAR: No le edema bilat.   NEUROLOGIC: Alert, speech clear        >50 MINUTES SPENT BY ME on the date of service doing chart review, history, exam, documentation & further activities per the note.  Data

## 2024-06-27 NOTE — PROGRESS NOTES
S: Pt. seen at [facility]. Female. Stoney PRICE. RX: [place], [place] collar. DX: History of spinal surgery.  O:A: Pt. presently wearing an [place] collar that fits well. Pt. also claims, and reading previous notes, that she has a [place] J collar. Pt. to use one of the collars for bathing.  P: Pt. to be seen as needed.    Ronnie PATEL, TORSTEN

## 2024-06-27 NOTE — PROGRESS NOTES
[facility]    Neurosurgery Progress Note    Date of Service (when I saw the patient): 06/27/2024     Assessment & Plan     Procedure(s):  RIGHT OPEN DOOR LAMINOPLASTY AT CERVICAL 5 - CERVICAL 6 WITH BILATERAL FORAMINOTOMIES   -1 Day Post-Op  Patient with continued complaint of pain notes that her upper and lower extremity pain is similar to preop though notes that her numbness of her hands has improved and only having numbness into her thumbs that remains unchanged. Notes frontal headache and posterior right eye though states that she has chronic headaches and appears to be unchanged presently     Plan:  Keep drain in place- drain in gravity   Monitor for headaches or increased drain output    PVRs to be completed  Changed robaxin 250mg q6hrs  Could have toradol 15mg once if okay by hospitalist   Xray to be completed   Scds, Lovenox 48hrs postoperative  Advance activity as tolerated with PT/OT  Tentative discharge 2-3 days, pending pain control, drain output and therapy      I have discussed the following assessment and plan Dr. Fernandez who is in agreement with initial plan and will follow up with further consultation recommendations.    Eve Renteria PA-C  [facility] Neurosurgery  Tel [phone]  Text page via [employer] Paging/Directory     Interval History   Incisional pain and full body pain similar to preop     Physical Exam   Temp: 98.1  F (36.7  C) Temp src: Oral BP: (!) 145/65 Pulse: 63   Resp: 16 SpO2: 95 % O2 Device: None (Room air) Oxygen Delivery: 2 LPM  Vitals:    06/26/24 1134   Weight: 71.3 kg (157 lb 3.2 oz)     Vital Signs with Ranges  Temp:  [97.2  F (36.2  C)-98.3  F (36.8  C)] 98.1  F (36.7  C)  Pulse:  [48-72] 63  Resp:  [11-22] 16  BP: (111-175)/(54-91) 145/65  SpO2:  [93 %-99 %] 95 %  I/O last 3 completed shifts:  In: 1100 [I.V.:1100]  Out: 375 [Urine:250; Drains:25; Blood:100]     , Blood pressure (!) 145/65, pulse 63, temperature 98.1  F (36.7  C), temperature  source Oral, resp. rate 16, weight 71.3 kg (157 lb 3.2 oz), SpO2 95%.  157 lbs 3.2 oz  HEENT:  Normocephalic.  PERRLA.  EOM s intact.    Neck:  Supple, non-tender, without lymphadenopathy.  Heart:  No peripheral edema  Lungs:  No SOB  Abdomen:  Soft, non-tender, non-distended.   Skin:  Warm and dry, good capillary refill.  Extremities:  Good radial and dorsalis pedis pulses bilaterally, no edema, cyanosis or clubbing.    NEUROLOGICAL EXAMINATION:   Mental status: alert and oriented x3 speech clear and appropriate   Cranial nerves: II-XII intact  Motor strength:   Biceps: 5/5 right, 5/5 left   Wrist extensors:5/5 right, 5/5 left   Triceps: 5/5 right, 5/5 left   Deltoids: 5/5 right, 5/5 left   Finger flexion: 5/5 right, 5/5 left   Finger abduction:5/5 right, 5/5 left   Hand : 5/5 right, 5/5 left   Hip flexors: 5/5 right, 5/5 left   Quadriceps: 5/5 right, 5/5 left  Ankle dorsiflexion: 5/5 right, 5/5 left    Ankle plantar flexion:5/5 right, 5/5 left   Extensor hallicus longus: 5/5 right, 5/5 left   Sensation:  intact  Reflexes:  Negative Babinski.  Negative Clonus.    Coordination:  Smooth finger to nose testing.   Negative pronator drift.    Gait:  not tested    Medications   Current Facility-Administered Medications   Medication Dose Route Frequency Provider Last Rate Last Admin     Current Facility-Administered Medications   Medication Dose Route Frequency Provider Last Rate Last Admin    acetaminophen (TYLENOL) tablet 975 mg  975 mg Oral Q8H Paulette Lua PA-C   975 mg at 06/27/24 0608    atorvastatin (LIPITOR) tablet 20 mg  20 mg Oral Daily Paulette Lua PA-C   20 mg at 06/27/24 0821    ceFAZolin (ANCEF) 1 g vial to attach to  ml bag for ADULT or 50 ml bag for PEDS  1 g Intravenous Q8H Paulette uLa PA-C   1 g at 06/27/24 0608    [START ON 6/28/2024] enoxaparin ANTICOAGULANT (LOVENOX) injection 40 mg  40 mg Subcutaneous Q24H Paulette Lua PA-C        gabapentin (NEURONTIN)  "capsule 900 mg  900 mg Oral TID Paulette Lua PA-C   900 mg at 06/27/24 0821    methocarbamol (ROBAXIN) half-tab 250 mg  250 mg Oral Q6H Eve Renteria PA-C        multivitamin, therapeutic (THERA-VIT) tablet 1 tablet  1 tablet Oral Daily Paulette Lua PA-C   1 tablet at 06/27/24 0822    polyethylene glycol (MIRALAX) Packet 17 g  17 g Oral Daily Paulette Lua PA-C   17 g at 06/27/24 0821    senna-docusate (SENOKOT-S/PERICOLACE) 8.6-50 MG per tablet 1 tablet  1 tablet Oral BID Paulette Lua PA-C   1 tablet at 06/27/24 0821       Data     CBC RESULTS:   Recent Labs   Lab Test 06/27/24  0558   WBC 11.2*   RBC 3.99   HGB 12.9   HCT 38.3   MCV 96   MCH 32.3   MCHC 33.7   RDW 12.2        Basic Metabolic Panel:  Lab Results   Component Value Date     06/27/2024      Lab Results   Component Value Date    POTASSIUM 4.7 06/27/2024    POTASSIUM 4.4 08/23/2021     Lab Results   Component Value Date    CHLORIDE 105 06/27/2024    CHLORIDE 107 08/23/2021     Lab Results   Component Value Date    JANET 8.5 06/27/2024     Lab Results   Component Value Date    CO2 25 06/27/2024    CO2 25 08/23/2021     Lab Results   Component Value Date    BUN 13.0 06/27/2024    BUN 14 08/23/2021     Lab Results   Component Value Date    CR 0.70 06/27/2024     Lab Results   Component Value Date     06/27/2024    GLC 95 06/26/2024    GLC 86 08/23/2021     INR:  No results found for: \"INR\"     "

## 2024-06-27 NOTE — PLAN OF CARE
Problem: Adult Inpatient Plan of Care  Goal: Plan of Care Review  Description: The Plan of Care Review/Shift note should be completed every shift.  The Outcome Evaluation is a brief statement about your assessment that the patient is improving, declining, or no change.  This information will be displayed automatically on your shift  note.  Outcome: Progressing     Problem: Spinal Surgery  Goal: Optimal Coping with Surgery  Outcome: Progressing     Problem: Pain Acute  Goal: Optimal Pain Control and Function  Outcome: Progressing   Goal Outcome Evaluation:             Pt alert and oriented x 4, scheduled tylenol,PRN oxycodone and PRN dilaudid given for pain management,pt pleasant and cooperative with cares,incision site dressing dry,clean and intact,carolann total out put was 25 ml from 7 p -7 a,up to bathroom with assist of 1-2  with belt,head of bed up 30 degree and neck brace on at all times,  per order,voiding ok,PVR =283 ml and 116 ml,pt still has numbness in her bilateral thumbs and big toes,family  at bed side,slept between cares.

## 2024-06-27 NOTE — PLAN OF CARE
Problem: Adult Inpatient Plan of Care  Goal: Plan of Care Review  Description: The Plan of Care Review/Shift note should be completed every shift.  The Outcome Evaluation is a brief statement about your assessment that the patient is improving, declining, or no change.  This information will be displayed automatically on your shift  note.  Outcome: Progressing   Goal Outcome Evaluation:       Pt has been up walking and sitting in chair. Pt has collar all times. Pt taking oxycodone for pain. Pt has been eating well and voiding.

## 2024-06-27 NOTE — CONSULTS
Care Management Initial Consult    General Information  Assessment completed with: Patient, Patient  Type of CM/SW Visit: Initial Assessment    Primary Care Provider verified and updated as needed: Yes   Readmission within the last 30 days: no previous admission in last 30 days      Reason for Consult: discharge planning  Advance Care Planning: Advance Care Planning Reviewed: verified with patient        Communication Assessment  Patient's communication style: spoken language (English or Bilingual)    Hearing Difficulty or Deaf: yes   Wear Glasses or Blind: yes    Cognitive  Cognitive/Neuro/Behavioral: orientation, level of consciousness  Level of Consciousness: alert  Arousal Level: opens eyes spontaneously  Orientation: oriented x 4        Speech: clear    Living Environment:   People in home: alone     Current living Arrangements: apartment      Able to return to prior arrangements: yes     Family/Social Support:  Care provided by: self  Provides care for: no one, unable/limited ability to care for self  Marital Status:   Children          Description of Support System: Supportive    Support Assessment: Adequate family and caregiver support    Current Resources:   Patient receiving home care services: No     Community Resources: None  Equipment currently used at home: other (see comments) (Walking stick)  Supplies currently used at home: None    Employment/Financial:  Employment Status: retired        Financial Concerns: none   Referral to Financial Worker: No     Does the patient's insurance plan have a 3 day qualifying hospital stay waiver?  Yes     Which insurance plan 3 day waiver is available? Alternative insurance waiver    Will the waiver be used for post-acute placement? Undetermined at this time    Lifestyle & Psychosocial Needs:  Social Determinants of Health     Food Insecurity: Low Risk  (9/21/2023)    Food Insecurity     Within the past 12 months, did you worry that your food would run out  before you got money to buy more?: No     Within the past 12 months, did the food you bought just not last and you didn t have money to get more?: No   Depression: Not at risk (4/25/2024)    PHQ-2     PHQ-2 Score: 1   Housing Stability: Low Risk  (9/21/2023)    Housing Stability     Do you have housing? : Yes     Are you worried about losing your housing?: No   Tobacco Use: Medium Risk (6/26/2024)    Patient History     Smoking Tobacco Use: Former     Smokeless Tobacco Use: Never     Passive Exposure: Not on file   Financial Resource Strain: Low Risk  (9/21/2023)    Financial Resource Strain     Within the past 12 months, have you or your family members you live with been unable to get utilities (heat, electricity) when it was really needed?: No   Alcohol Use: Not on file   Transportation Needs: High Risk (9/21/2023)    Transportation Needs     Within the past 12 months, has lack of transportation kept you from medical appointments, getting your medicines, non-medical meetings or appointments, work, or from getting things that you need?: Yes   Physical Activity: Not on file   Interpersonal Safety: Low Risk  (9/22/2023)    Interpersonal Safety     Do you feel physically and emotionally safe where you currently live?: Yes     Within the past 12 months, have you been hit, slapped, kicked or otherwise physically hurt by someone?: No     Within the past 12 months, have you been humiliated or emotionally abused in other ways by your partner or ex-partner?: No   Stress: Not on file   Social Connections: Not on file   Health Literacy: Not on file     Functional Status:  Prior to admission patient needed assistance:   Dependent ADLs:: Independent  Dependent IADLs:: Transportation    Additional Information:  Writer met with patient at bedside to review role of care management services, discuss goals of care and assess need for any possible services at discharge. Patient alert, answering questions appropriately and engaged in  the conversation. Patient reported she has a HCD, but no papers available. Lives alone in an apartment. Report independent with ADLs, but needs assistance with IADLs. Has walking stick and standard walker. No community resources. Has [employer]. Goal is to return home. Anticipate family will transport.        Astrid Jackson RN

## 2024-06-28 ENCOUNTER — TRANSFERRED RECORDS (OUTPATIENT)
Dept: HEALTH INFORMATION MANAGEMENT | Facility: CLINIC | Age: 79
End: 2024-06-28
Payer: COMMERCIAL

## 2024-06-28 ENCOUNTER — APPOINTMENT (OUTPATIENT)
Dept: OCCUPATIONAL THERAPY | Facility: HOSPITAL | Age: 79
DRG: 519 | End: 2024-06-28
Attending: SURGERY
Payer: COMMERCIAL

## 2024-06-28 ENCOUNTER — APPOINTMENT (OUTPATIENT)
Dept: PHYSICAL THERAPY | Facility: HOSPITAL | Age: 79
DRG: 519 | End: 2024-06-28
Attending: SURGERY
Payer: COMMERCIAL

## 2024-06-28 LAB — GLUCOSE BLDC GLUCOMTR-MCNC: 106 MG/DL (ref 70–99)

## 2024-06-28 PROCEDURE — 97110 THERAPEUTIC EXERCISES: CPT | Mod: GP

## 2024-06-28 PROCEDURE — 250N000011 HC RX IP 250 OP 636

## 2024-06-28 PROCEDURE — 97116 GAIT TRAINING THERAPY: CPT | Mod: GP

## 2024-06-28 PROCEDURE — 250N000013 HC RX MED GY IP 250 OP 250 PS 637

## 2024-06-28 PROCEDURE — 97110 THERAPEUTIC EXERCISES: CPT | Mod: GO

## 2024-06-28 PROCEDURE — 97535 SELF CARE MNGMENT TRAINING: CPT | Mod: GO

## 2024-06-28 PROCEDURE — 120N000001 HC R&B MED SURG/OB

## 2024-06-28 PROCEDURE — 99232 SBSQ HOSP IP/OBS MODERATE 35: CPT | Performed by: INTERNAL MEDICINE

## 2024-06-28 PROCEDURE — 97530 THERAPEUTIC ACTIVITIES: CPT | Mod: GP

## 2024-06-28 RX ADMIN — OXYCODONE HYDROCHLORIDE 5 MG: 5 TABLET ORAL at 00:48

## 2024-06-28 RX ADMIN — SENNOSIDES AND DOCUSATE SODIUM 1 TABLET: 50; 8.6 TABLET ORAL at 09:04

## 2024-06-28 RX ADMIN — ATORVASTATIN CALCIUM 20 MG: 10 TABLET, FILM COATED ORAL at 09:02

## 2024-06-28 RX ADMIN — SENNOSIDES AND DOCUSATE SODIUM 1 TABLET: 50; 8.6 TABLET ORAL at 20:53

## 2024-06-28 RX ADMIN — GABAPENTIN 900 MG: 300 CAPSULE ORAL at 09:03

## 2024-06-28 RX ADMIN — POLYETHYLENE GLYCOL 3350 17 G: 17 POWDER, FOR SOLUTION ORAL at 09:01

## 2024-06-28 RX ADMIN — GABAPENTIN 900 MG: 300 CAPSULE ORAL at 13:51

## 2024-06-28 RX ADMIN — THERA TABS 1 TABLET: TAB at 09:04

## 2024-06-28 RX ADMIN — OXYCODONE HYDROCHLORIDE 10 MG: 5 TABLET ORAL at 05:07

## 2024-06-28 RX ADMIN — ACETAMINOPHEN 975 MG: 325 TABLET ORAL at 13:51

## 2024-06-28 RX ADMIN — ACETAMINOPHEN 975 MG: 325 TABLET ORAL at 22:04

## 2024-06-28 RX ADMIN — GABAPENTIN 900 MG: 300 CAPSULE ORAL at 20:52

## 2024-06-28 RX ADMIN — ENOXAPARIN SODIUM 40 MG: 40 INJECTION SUBCUTANEOUS at 18:00

## 2024-06-28 RX ADMIN — OXYCODONE HYDROCHLORIDE 10 MG: 5 TABLET ORAL at 13:58

## 2024-06-28 RX ADMIN — ACETAMINOPHEN 975 MG: 325 TABLET ORAL at 05:08

## 2024-06-28 RX ADMIN — OXYCODONE HYDROCHLORIDE 10 MG: 5 TABLET ORAL at 09:19

## 2024-06-28 RX ADMIN — OXYCODONE HYDROCHLORIDE 10 MG: 5 TABLET ORAL at 18:12

## 2024-06-28 ASSESSMENT — ACTIVITIES OF DAILY LIVING (ADL)
ADLS_ACUITY_SCORE: 32
ADLS_ACUITY_SCORE: 32
ADLS_ACUITY_SCORE: 37
ADLS_ACUITY_SCORE: 36
ADLS_ACUITY_SCORE: 32
ADLS_ACUITY_SCORE: 32
ADLS_ACUITY_SCORE: 36
ADLS_ACUITY_SCORE: 32
ADLS_ACUITY_SCORE: 32
ADLS_ACUITY_SCORE: 36
ADLS_ACUITY_SCORE: 36
ADLS_ACUITY_SCORE: 32

## 2024-06-28 NOTE — PROGRESS NOTES
"Care Management Follow Up    Length of Stay (days): 2    Expected Discharge Date: 06/28/2024     Concerns to be Addressed: Care progression - discharge planning     Patient plan of care discussed at interdisciplinary rounds: Yes    Anticipated Discharge Disposition:  Transitional Care     Anticipated Discharge Services:  Transitional Care  Anticipated Discharge DME:  NA    Patient/family educated on Medicare website which has current facility and service quality ratings:  NA  Education Provided on the Discharge Plan:  Yes per team  Patient/Family in Agreement with the Plan:  Yes    Referrals Placed by CM/SW:  Yes  Private pay costs discussed: Not applicable    Additional Information:  Per provider, Dr. German, patient is not ready. Maybe here another 1-2 days.    Social Hx: \"Live alone in an apartment. Report independent w/ADLs, but needs assistance w/IADLs. Has walking stick and standard walker. No community resources. Has [employer]. Rehab rec TCU, referrals sent. Anticipate family will transport.\"    RNCM to follow for medical progression, recommendations, and final discharge plan.     Astrid Jackson RN     "

## 2024-06-28 NOTE — PROGRESS NOTES
"[facility]    Medicine Progress Note - Hospitalist Service    Date of Admission:  6/26/2024    Assessment & Plan   78-year-old female with history of intractable neck pain, cervical radiculopathy and cervical stenosis, macular degeneration, osteoporosis, hyperlipidemia presents for laminoplasty at C5/C6 with bilateral foraminotomies.  [facility] consulted for medical management.        History of cervical radiculopathy, cervical stenosis, cervical spondylosis and myelopathy  -- Status post laminoplasty at C5/6 with bilateral foraminotomies 6/26/2024  -- Postop cares per neurosurgery  --Continue methocarbamol  --Analgesics as needed  --PT/OT  --Keep neck collar in place  --EUGENE drain management per neurosurgery service.     Peripheral neuropathy  Bilateral lower extremity numbness  --Continue PTA gabapentin 900 mg 3 times daily    Hyperlipidemia  --Continue atorvastatin 20 mg daily    Diet: Advance Diet as Tolerated: Regular Diet Adult    DVT Prophylaxis: Enoxaparin (Lovenox) SQ  Szymanski Catheter: Not present  Lines: None     Cardiac Monitoring: None  Code Status: Full Code      Clinically Significant Risk Factors                               # Overweight: Estimated body mass index is 27.85 kg/m  as calculated from the following:    Height as of 6/20/24: 1.6 m (5' 3\").    Weight as of this encounter: 71.3 kg (157 lb 3.2 oz)., PRESENT ON ADMISSION       # Financial/Environmental Concerns: none         Disposition Plan     Medically Ready for Discharge: Anticipated Tomorrow             Jaret German MD  Hospitalist Service  [facility]  Securely message with [employer] (more info)  Text page via [employer] Paging/Directory   ______________________________________________________________________    Interval History   No new complaints today. She reports numbness in her fingers and toes.   Son was updated about current status and plans at bedside.      Physical Exam   Vital Signs: " Temp: 98.1  F (36.7  C) Temp src: Oral BP: 129/60 Pulse: 63   Resp: 18 SpO2: 97 % O2 Device: Nasal cannula Oxygen Delivery: 2 LPM  Weight: 157 lbs 3.2 oz      General appearance: Elderly woman sitting comfortably on the recliner, with neck collar in place, awake, Alert, Cooperative, not in any obvious distress and appears stated age   HEENT: Normocephalic, atraumatic, conjunctiva clear without icterus and ears without discharge neck  Neck: Neck collar with EUGENE drain in place draining bloody fluid.  Lungs: Clear to auscultation bilaterally, no wheezing, good air exchange, normal work of breathing  Cardiovascular: Regular Rate and Rythm, normal apical impulse, normal S1 and S2, no lower extremity edema bilaterally  Abdomen: Soft, non-tender and Non-distended, active bowel sounds  Skin: Skin color, texture normal and bruising or bleeding. No rashes or lesions over face, neck, arms and legs, turgor normal.  Musculoskeletal: No bony deformities or joint tenderness. Normal ROM upon flexion & extension.   Neurologic: Alert & Oriented X 3, Facial symmetry preserved and upper & lower extremities moving well with symmetry  Psychiatric: Calm, normal eye contact and normal affect      Medical Decision Making       40 MINUTES SPENT BY ME on the date of service doing chart review, history, exam, documentation & further activities per the note.      Data   Imaging results reviewed over the past 24 hrs:   Recent Results (from the past 24 hour(s))   XR Chest 1 View    Narrative    EXAM: XR CHEST 1 VIEW  LOCATION: [facility]  DATE: 6/27/2024    INDICATION: Shortness of breath. Concern for pneumonia or pulmonary edema.  COMPARISON: 1/21/2023       Impression    IMPRESSION: minimal linear opacities left lung base likely normal vascularity. Lungs are essentially clear with nothing for pneumonia or pulmonary edema.   XR Cervical Spine 2/3 Views    Narrative    EXAM: XR CERVICAL SPINE 2/3 VIEWS  LOCATION: [facility]  DATE: 6/27/2024    INDICATION: sp laminoplasty  COMPARISON: October 17, 2023.      Impression    IMPRESSION: Straightening of the normal cervical lordosis. Retrolisthesis C3 on C4 measuring 1-2 mm. Patient is status post right-sided C5 and C6 laminoplasty. A surgical drain is present in the dorsal paraspinal soft tissues. Vertebral body heights are   maintained. No anterior compression fracture. Advanced C5-6 disc height loss. Mild disc degeneration elsewhere. Moderate cervical facet arthrosis. Pulmonary apices are clear.           Epidermal Sutures: 6-0 Ethilon

## 2024-06-28 NOTE — PLAN OF CARE
"Pt is A&O x 4, calm and cooperative.   Pt cardiac, lung, and bowel sounds WDL.     Pt has a left peripheral IV, saline locked.  C spine incision looks good, dressing changed today (D/t doc taking the dressing off, and reapplying the c-collar w/o reapplying the dressing). This RN reapplied the incisional dressing, and put the C-collar back on.   Pt is eating and drinking well. Pt complained of severe pain (headache) this morning, meds given, pain well controlled. Family visited today. Pt was up in chair for all meals. Pt is resting comfortably. Will continue to monitor.             Problem: Adult Inpatient Plan of Care  Goal: Plan of Care Review  Description: The Plan of Care Review/Shift note should be completed every shift.  The Outcome Evaluation is a brief statement about your assessment that the patient is improving, declining, or no change.  This information will be displayed automatically on your shift  note.  Outcome: Progressing  Goal: Patient-Specific Goal (Individualized)  Description: You can add care plan individualizations to a care plan. Examples of Individualization might be:  \"Parent requests to be called daily at 9am for status\", \"I have a hard time hearing out of my right ear\", or \"Do not touch me to wake me up as it startles  me\".  Outcome: Progressing  Goal: Absence of Hospital-Acquired Illness or Injury  Outcome: Progressing  Intervention: Identify and Manage Fall Risk  Recent Flowsheet Documentation  Taken 6/28/2024 0900 by Vani Ladd RN  Safety Promotion/Fall Prevention:   activity supervised   lighting adjusted   mobility aid in reach   nonskid shoes/slippers when out of bed   patient and family education   supervised activity   safety round/check completed  Intervention: Prevent and Manage VTE (Venous Thromboembolism) Risk  Recent Flowsheet Documentation  Taken 6/28/2024 1000 by Vani Ladd RN  VTE Prevention/Management: SCDs off (sequential compression devices)  Intervention: " Prevent Infection  Recent Flowsheet Documentation  Taken 6/28/2024 0900 by Vani Ladd RN  Infection Prevention:   hand hygiene promoted   personal protective equipment utilized   single patient room provided  Goal: Optimal Comfort and Wellbeing  Outcome: Progressing  Intervention: Monitor Pain and Promote Comfort  Recent Flowsheet Documentation  Taken 6/28/2024 0900 by Vani Ladd RN  Pain Management Interventions: medication (see MAR)  Goal: Readiness for Transition of Care  Outcome: Progressing     Problem: Risk for Delirium  Goal: Optimal Coping  Outcome: Progressing  Goal: Improved Behavioral Control  Outcome: Progressing  Intervention: Minimize Safety Risk  Recent Flowsheet Documentation  Taken 6/28/2024 0900 by Vani Ladd RN  Communication Enhancement Strategies: call light answered in person  Enhanced Safety Measures: pain management  Goal: Improved Attention and Thought Clarity  Outcome: Progressing  Intervention: Maximize Cognitive Function  Recent Flowsheet Documentation  Taken 6/28/2024 0900 by Vani Ladd RN  Reorientation Measures: clock in view  Goal: Improved Sleep  Outcome: Progressing     Problem: Spinal Surgery  Goal: Optimal Coping with Surgery  Outcome: Progressing  Goal: Absence of Bleeding  Outcome: Progressing  Goal: Effective Bowel Elimination  Outcome: Progressing  Intervention: Enhance Bowel Motility and Elimination  Recent Flowsheet Documentation  Taken 6/28/2024 1000 by Vani Ladd RN  Bowel Motility Enhancement: fluid intake encouraged  Goal: Fluid and Electrolyte Balance  Outcome: Progressing  Intervention: Monitor and Manage Fluid and Electrolyte Balance  Recent Flowsheet Documentation  Taken 6/28/2024 1000 by Vani Ladd RN  Fluid/Electrolyte Management: fluids provided  Goal: Optimal Functional Ability  Outcome: Progressing  Intervention: Optimize Functional Status  Recent Flowsheet Documentation  Taken 6/28/2024 0900 by Vani Ladd  RN  Positioning/Transfer Devices:   pillows   in use  Goal: Absence of Infection Signs and Symptoms  Outcome: Progressing  Intervention: Prevent or Manage Infection  Recent Flowsheet Documentation  Taken 6/28/2024 0900 by Vani Ladd RN  Infection Prevention:   hand hygiene promoted   personal protective equipment utilized   single patient room provided  Goal: Optimal Neurologic Function  Outcome: Progressing  Goal: Anesthesia/Sedation Recovery  Outcome: Progressing  Intervention: Optimize Anesthesia Recovery  Recent Flowsheet Documentation  Taken 6/28/2024 0900 by Vani Ladd RN  Safety Promotion/Fall Prevention:   activity supervised   lighting adjusted   mobility aid in reach   nonskid shoes/slippers when out of bed   patient and family education   supervised activity   safety round/check completed  Reorientation Measures: clock in view  Goal: Optimal Pain Control and Function  Outcome: Progressing  Intervention: Prevent or Manage Pain  Recent Flowsheet Documentation  Taken 6/28/2024 0900 by Vani Ladd RN  Pain Management Interventions: medication (see MAR)  Goal: Nausea and Vomiting Relief  Outcome: Progressing  Goal: Effective Urinary Elimination  Outcome: Progressing  Goal: Effective Oxygenation and Ventilation  Outcome: Progressing     Problem: Pain Acute  Goal: Optimal Pain Control and Function  Outcome: Progressing  Intervention: Develop Pain Management Plan  Recent Flowsheet Documentation  Taken 6/28/2024 0900 by Vani Ladd RN  Pain Management Interventions: medication (see MAR)  Intervention: Prevent or Manage Pain  Recent Flowsheet Documentation  Taken 6/28/2024 0900 by Vani Ladd RN  Medication Review/Management: medications reviewed   Goal Outcome Evaluation:

## 2024-06-28 NOTE — PLAN OF CARE
"  Problem: Adult Inpatient Plan of Care  Goal: Plan of Care Review  Description: The Plan of Care Review/Shift note should be completed every shift.  The Outcome Evaluation is a brief statement about your assessment that the patient is improving, declining, or no change.  This information will be displayed automatically on your shift  note.  Outcome: Progressing     Problem: Adult Inpatient Plan of Care  Goal: Patient-Specific Goal (Individualized)  Description: You can add care plan individualizations to a care plan. Examples of Individualization might be:  \"Parent requests to be called daily at 9am for status\", \"I have a hard time hearing out of my right ear\", or \"Do not touch me to wake me up as it startles  me\".  Outcome: Progressing     Problem: Risk for Delirium  Goal: Improved Sleep  Outcome: Progressing     Problem: Spinal Surgery  Goal: Optimal Coping with Surgery  Outcome: Progressing     Problem: Pain Acute  Goal: Optimal Pain Control and Function  Outcome: Progressing   Goal Outcome Evaluation:      Pt is alert, oriented and pleasant.  Afebrile, /65. C/o pain 5/10 to neck  and shoulder given Oxy 5 mg. Neck brace on all the time. EUGENE drained total 45 ml this shift, serosanguenous drainage.Sleeping between cares. C/o pain to neck and shoulder 10/10 given Oxy 10 mg at 0507 AM. Offered ice packs, declined. Pt ambulates to bathroom standby assist.                 "

## 2024-06-28 NOTE — PROGRESS NOTES
Neurosurgery Progress Note:     POD# 2 s/p right open-door laminoplasty at C5-C6 with bilateral foraminotomies with Dr. Fernandez.    24 hours events: Exam stable.  Patient still with incisional pain.  Postoperative x-rays completed     On exam: Patient is alert and oriented and in c-collar.  Patient has 5 out of 5 strength to all extremities.  Denies any radicular symptoms or paresthesias radiating into her upper extremities.  Patient walking well with therapy.  Does have complaints of stiff neck and incisional pain.    Incision: Dressing removed.  Incision is clean dry and intact free of signs of infection.  Incision is closed with sutures.  Surgical drain with 45 mL of output overnight     PLAN:  -Monitor for headaches or increased drain output    -Scds, Lovenox 48hrs postoperative  -Advance activity as tolerated with PT/OT  -Tentative discharge today or over the weekend dependent on pain control, drain removal, and TCU placement    *Drain removed this afternoon with stitch placed at drain site.    Jake Jameson DNP  [facility] Neurosurgery  [address] [address] [phone]      Plan discussed with Dr. Jim Summers Disclosure   This was created at least in part with a voice recognition software. Mistakes/typos may be present.

## 2024-06-28 NOTE — PLAN OF CARE
Problem: Adult Inpatient Plan of Care  Goal: Absence of Hospital-Acquired Illness or Injury  Intervention: Identify and Manage Fall Risk  Recent Flowsheet Documentation  Taken 6/27/2024 2028 by rTu Escobar, RN  Safety Promotion/Fall Prevention:   activity supervised   clutter free environment maintained   room organization consistent   safety round/check completed     Problem: Adult Inpatient Plan of Care  Goal: Absence of Hospital-Acquired Illness or Injury  Intervention: Prevent Infection  Recent Flowsheet Documentation  Taken 6/27/2024 2028 by Tru Escobar, RN  Infection Prevention:   hand hygiene promoted   personal protective equipment utilized   single patient room provided     Problem: Risk for Delirium  Goal: Improved Behavioral Control  Intervention: Minimize Safety Risk  Recent Flowsheet Documentation  Taken 6/27/2024 2028 by Tru Escobar, RN  Communication Enhancement Strategies: call light answered in person  Enhanced Safety Measures:   pain management   review medications for side effects with activity       Goal Outcome Evaluation:      Plan of Care Reviewed With: patient    Overall Patient Progress: improvingOverall Patient Progress: improving       A & O x 4, pleasant. VSS except hypertensive on RA. Pain addressed with PRN and scheduled medication. HOB maintained > 30 degrees. L PIV SL. Regular diet, tolerating well. EUGENE drain to gravity, NO bulb suction dressing CDI over neck. Standby assist, up in chair. Disposition: TCU tomorrow. Nursing continue to monitor.

## 2024-06-29 ENCOUNTER — APPOINTMENT (OUTPATIENT)
Dept: OCCUPATIONAL THERAPY | Facility: HOSPITAL | Age: 79
DRG: 519 | End: 2024-06-29
Attending: SURGERY
Payer: COMMERCIAL

## 2024-06-29 ENCOUNTER — APPOINTMENT (OUTPATIENT)
Dept: PHYSICAL THERAPY | Facility: HOSPITAL | Age: 79
DRG: 519 | End: 2024-06-29
Attending: SURGERY
Payer: COMMERCIAL

## 2024-06-29 LAB
CREAT SERPL-MCNC: 0.68 MG/DL (ref 0.51–0.95)
EGFRCR SERPLBLD CKD-EPI 2021: 89 ML/MIN/1.73M2
PLATELET # BLD AUTO: 201 10E3/UL (ref 150–450)

## 2024-06-29 PROCEDURE — 250N000013 HC RX MED GY IP 250 OP 250 PS 637

## 2024-06-29 PROCEDURE — 85049 AUTOMATED PLATELET COUNT: CPT

## 2024-06-29 PROCEDURE — 250N000011 HC RX IP 250 OP 636

## 2024-06-29 PROCEDURE — 120N000001 HC R&B MED SURG/OB

## 2024-06-29 PROCEDURE — 97535 SELF CARE MNGMENT TRAINING: CPT | Mod: GO

## 2024-06-29 PROCEDURE — 99232 SBSQ HOSP IP/OBS MODERATE 35: CPT | Performed by: INTERNAL MEDICINE

## 2024-06-29 PROCEDURE — 36415 COLL VENOUS BLD VENIPUNCTURE: CPT

## 2024-06-29 PROCEDURE — 82565 ASSAY OF CREATININE: CPT | Performed by: SURGERY

## 2024-06-29 PROCEDURE — 97116 GAIT TRAINING THERAPY: CPT | Mod: GP

## 2024-06-29 RX ORDER — OXYCODONE HYDROCHLORIDE 5 MG/1
5-10 TABLET ORAL EVERY 4 HOURS PRN
Qty: 40 TABLET | Refills: 0 | Status: SHIPPED | OUTPATIENT
Start: 2024-06-29 | End: 2024-07-25

## 2024-06-29 RX ORDER — AMOXICILLIN 250 MG
1 CAPSULE ORAL DAILY PRN
Qty: 30 TABLET | Refills: 0 | Status: SHIPPED | OUTPATIENT
Start: 2024-06-29 | End: 2024-07-19

## 2024-06-29 RX ORDER — ACETAMINOPHEN 325 MG/1
975 TABLET ORAL EVERY 8 HOURS PRN
COMMUNITY
Start: 2024-06-29

## 2024-06-29 RX ADMIN — ACETAMINOPHEN 650 MG: 325 TABLET ORAL at 21:15

## 2024-06-29 RX ADMIN — SENNOSIDES AND DOCUSATE SODIUM 1 TABLET: 50; 8.6 TABLET ORAL at 08:33

## 2024-06-29 RX ADMIN — ACETAMINOPHEN 975 MG: 325 TABLET ORAL at 06:13

## 2024-06-29 RX ADMIN — GABAPENTIN 900 MG: 300 CAPSULE ORAL at 08:33

## 2024-06-29 RX ADMIN — SENNOSIDES AND DOCUSATE SODIUM 1 TABLET: 50; 8.6 TABLET ORAL at 21:15

## 2024-06-29 RX ADMIN — OXYCODONE HYDROCHLORIDE 10 MG: 5 TABLET ORAL at 14:32

## 2024-06-29 RX ADMIN — GABAPENTIN 900 MG: 300 CAPSULE ORAL at 13:18

## 2024-06-29 RX ADMIN — ENOXAPARIN SODIUM 40 MG: 40 INJECTION SUBCUTANEOUS at 18:31

## 2024-06-29 RX ADMIN — OXYCODONE HYDROCHLORIDE 10 MG: 5 TABLET ORAL at 06:14

## 2024-06-29 RX ADMIN — GABAPENTIN 900 MG: 300 CAPSULE ORAL at 21:15

## 2024-06-29 RX ADMIN — OXYCODONE HYDROCHLORIDE 10 MG: 5 TABLET ORAL at 18:30

## 2024-06-29 RX ADMIN — POLYETHYLENE GLYCOL 3350 17 G: 17 POWDER, FOR SOLUTION ORAL at 08:33

## 2024-06-29 RX ADMIN — OXYCODONE HYDROCHLORIDE 10 MG: 5 TABLET ORAL at 10:54

## 2024-06-29 RX ADMIN — THERA TABS 1 TABLET: TAB at 08:33

## 2024-06-29 RX ADMIN — ATORVASTATIN CALCIUM 20 MG: 10 TABLET, FILM COATED ORAL at 08:33

## 2024-06-29 RX ADMIN — ACETAMINOPHEN 975 MG: 325 TABLET ORAL at 13:18

## 2024-06-29 ASSESSMENT — ACTIVITIES OF DAILY LIVING (ADL)
ADLS_ACUITY_SCORE: 34
ADLS_ACUITY_SCORE: 33
ADLS_ACUITY_SCORE: 35
ADLS_ACUITY_SCORE: 33
ADLS_ACUITY_SCORE: 35
ADLS_ACUITY_SCORE: 36
ADLS_ACUITY_SCORE: 33
ADLS_ACUITY_SCORE: 33
ADLS_ACUITY_SCORE: 34
ADLS_ACUITY_SCORE: 33
ADLS_ACUITY_SCORE: 34
ADLS_ACUITY_SCORE: 36
ADLS_ACUITY_SCORE: 33
ADLS_ACUITY_SCORE: 36
ADLS_ACUITY_SCORE: 34
ADLS_ACUITY_SCORE: 33
ADLS_ACUITY_SCORE: 34

## 2024-06-29 NOTE — PROGRESS NOTES
"[facility]    Medicine Progress Note - Hospitalist Service    Date of Admission:  6/26/2024    Assessment & Plan   78-year-old female with history of intractable neck pain, cervical radiculopathy and cervical stenosis, macular degeneration, osteoporosis, hyperlipidemia presents for laminoplasty at C5/C6 with bilateral foraminotomies.  [facility] consulted for medical management.     EUGENE drain was removed on 6/20/2024.  She is waiting for TCU placement.    History of cervical radiculopathy, cervical stenosis, cervical spondylosis and myelopathy  -- Status post laminoplasty at C5/6 with bilateral foraminotomies 6/26/2024  -- Postop cares per neurosurgery  --Continue methocarbamol  --Analgesics as needed  --PT/OT  --Keep neck collar in place  --EUGENE drain management per neurosurgery service.     Peripheral neuropathy  Bilateral lower extremity numbness  --Continue PTA gabapentin 900 mg 3 times daily    Hyperlipidemia  --Continue atorvastatin 20 mg daily    Diet: Advance Diet as Tolerated: Regular Diet Adult    DVT Prophylaxis: Enoxaparin (Lovenox) SQ  Szymanski Catheter: Not present  Lines: None     Cardiac Monitoring: None  Code Status: Full Code      Clinically Significant Risk Factors                               # Overweight: Estimated body mass index is 27.85 kg/m  as calculated from the following:    Height as of 6/20/24: 1.6 m (5' 3\").    Weight as of this encounter: 71.3 kg (157 lb 3.2 oz)., PRESENT ON ADMISSION       # Financial/Environmental Concerns: none         Disposition Plan     Medically Ready for Discharge: Anticipated Tomorrow             Jaret German MD  Hospitalist Service  [facility]  Securely message with "[employer] (more info)  Text page via [employer] Paging/Directory   ______________________________________________________________________    Interval History   No new complaints today and no acute events overnight.  Sitting comfortably in the recliner.  " Awaiting TCU placement.     Physical Exam   Vital Signs: Temp: 98  F (36.7  C) Temp src: Oral BP: 131/60 Pulse: 66   Resp: 20 SpO2: 93 % O2 Device: Nasal cannula Oxygen Delivery: 2 LPM  Weight: 157 lbs 3.2 oz      General appearance: Elderly woman sitting comfortably on the recliner, with neck collar in place, awake, Alert, Cooperative, not in any obvious distress and appears stated age   HEENT: Normocephalic, atraumatic, conjunctiva clear without icterus and ears without discharge neck  Neck: Neck collar with EUGENE drain in place draining bloody fluid.  Lungs: Clear to auscultation bilaterally, no wheezing, good air exchange, normal work of breathing  Cardiovascular: Regular Rate and Rythm, normal apical impulse, normal S1 and S2, no lower extremity edema bilaterally  Abdomen: Soft, non-tender and Non-distended, active bowel sounds  Skin: Skin color, texture normal and bruising or bleeding. No rashes or lesions over face, neck, arms and legs, turgor normal.  Musculoskeletal: No bony deformities or joint tenderness. Normal ROM upon flexion & extension.   Neurologic: Alert & Oriented X 3, Facial symmetry preserved and upper & lower extremities moving well with symmetry  Psychiatric: Calm, normal eye contact and normal affect      Medical Decision Making       40 MINUTES SPENT BY ME on the date of service doing chart review, history, exam, documentation & further activities per the note.      Data   Imaging results reviewed over the past 24 hrs:   No results found for this or any previous visit (from the past 24 hour(s)).

## 2024-06-29 NOTE — PLAN OF CARE
Problem: Spinal Surgery  Goal: Optimal Coping with Surgery  Outcome: Progressing  Goal: Absence of Bleeding  Outcome: Progressing  Goal: Effective Bowel Elimination  Outcome: Progressing  Intervention: Enhance Bowel Motility and Elimination  Recent Flowsheet Documentation  Taken 6/28/2024 1700 by Christine Fink RN  Bowel Motility Enhancement: fluid intake encouraged  Goal: Fluid and Electrolyte Balance  Outcome: Progressing  Intervention: Monitor and Manage Fluid and Electrolyte Balance  Recent Flowsheet Documentation  Taken 6/28/2024 1700 by Christine Fink RN  Fluid/Electrolyte Management: fluids provided  Goal: Optimal Functional Ability  Outcome: Progressing  Intervention: Optimize Functional Status  Recent Flowsheet Documentation  Taken 6/28/2024 1700 by Christine Fink RN  Activity Management:   activity adjusted per tolerance   ambulated to bathroom  Taken 6/28/2024 1350 by Christine Fink RN  Activity Management: up in chair  Goal: Absence of Infection Signs and Symptoms  Outcome: Progressing  Goal: Optimal Neurologic Function  Outcome: Progressing  Intervention: Optimize Neurologic Function  Recent Flowsheet Documentation  Taken 6/28/2024 1700 by Christine Fink RN  Body Position: position changed independently  Goal: Anesthesia/Sedation Recovery  Outcome: Progressing  Intervention: Optimize Anesthesia Recovery  Recent Flowsheet Documentation  Taken 6/28/2024 1700 by Christine Fink RN  Safety Promotion/Fall Prevention:   activity supervised   lighting adjusted   mobility aid in reach   room organization consistent   nonskid shoes/slippers when out of bed  Goal: Optimal Pain Control and Function  Outcome: Progressing  Intervention: Prevent or Manage Pain  Recent Flowsheet Documentation  Taken 6/28/2024 1812 by Christine Fink RN  Pain Management Interventions: medication (see MAR)  Goal: Nausea and Vomiting Relief  Outcome: Progressing  Goal: Effective Urinary  Elimination  Outcome: Progressing  Goal: Effective Oxygenation and Ventilation  Outcome: Progressing  Intervention: Optimize Oxygenation and Ventilation  Recent Flowsheet Documentation  Taken 6/28/2024 1700 by Christine Fink, RN  Head of Bed (HOB) Positioning: HOB at 20 degrees   Goal Outcome Evaluation:  EUGENE removed by NS PA. No drainage noted this shift. Aspen collar intact, inplace. Pt rates pain 8 of 10 this shift. Oxycodone 10mg effective for pain. Pain scale reexplained. Pt reminded to use call light as attempts to get up to br and chair on own. Pt stable with walker.

## 2024-06-29 NOTE — PLAN OF CARE
Problem: Adult Inpatient Plan of Care  Goal: Plan of Care Review  Description: The Plan of Care Review/Shift note should be completed every shift.  The Outcome Evaluation is a brief statement about your assessment that the patient is improving, declining, or no change.  This information will be displayed automatically on your shift  note.  Outcome: Progressing     Problem: Adult Inpatient Plan of Care  Goal: Optimal Comfort and Wellbeing  Intervention: Monitor Pain and Promote Comfort  Recent Flowsheet Documentation  Taken 6/29/2024 0027 by Brian Raymundo RN  Pain Management Interventions: rest  Taken 6/28/2024 2204 by Brian Raymundo RN  Pain Management Interventions:   medication (see MAR)   repositioned   rest  Taken 6/28/2024 2157 by Brian Raymundo RN  Pain Management Interventions:   medication (see MAR)   repositioned   rest  Taken 6/28/2024 2052 by Brian Raymundo RN  Pain Management Interventions:   medication (see MAR)   repositioned   rest     Problem: Risk for Delirium  Goal: Improved Sleep  Outcome: Progressing     Problem: Spinal Surgery  Goal: Absence of Bleeding  Outcome: Progressing     Problem: Spinal Surgery  Goal: Effective Bowel Elimination  Outcome: Progressing  Intervention: Enhance Bowel Motility and Elimination  Recent Flowsheet Documentation  Taken 6/29/2024 0027 by Brian Raymundo RN  Bowel Motility Enhancement: fluid intake encouraged  Taken 6/28/2024 2052 by Brian Raymundo RN  Bowel Motility Enhancement: fluid intake encouraged     Problem: Spinal Surgery  Goal: Absence of Infection Signs and Symptoms  Outcome: Progressing  Intervention: Prevent or Manage Infection  Recent Flowsheet Documentation  Taken 6/29/2024 0027 by Brian Raymundo RN  Infection Prevention:   hand hygiene promoted   personal protective equipment utilized   single patient room provided  Taken 6/28/2024 2052 by Brian Raymundo RN  Infection Prevention:   hand hygiene promoted   personal  protective equipment utilized   single patient room provided     Problem: Spinal Surgery  Goal: Optimal Neurologic Function  Outcome: Progressing  Intervention: Optimize Neurologic Function  Recent Flowsheet Documentation  Taken 6/29/2024 0027 by Brian Raymundo RN  Body Position:   position changed independently   supine, head elevated  Taken 6/28/2024 2052 by Brian Raymundo RN  Body Position:   position changed independently   supine, head elevated     Problem: Spinal Surgery  Goal: Optimal Pain Control and Function  Outcome: Progressing  Intervention: Prevent or Manage Pain  Recent Flowsheet Documentation  Taken 6/29/2024 0027 by Brian Raymundo RN  Pain Management Interventions: rest  Taken 6/28/2024 2204 by Brian Raymundo RN  Pain Management Interventions:   medication (see MAR)   repositioned   rest  Taken 6/28/2024 2157 by Brian Raymundo RN  Pain Management Interventions:   medication (see MAR)   repositioned   rest  Taken 6/28/2024 2052 by Brian Raymundo RN  Pain Management Interventions:   medication (see MAR)   repositioned   rest     Problem: Spinal Surgery  Goal: Effective Urinary Elimination  Outcome: Progressing    Goal Outcome Evaluation:    Patient AOX4. Able to make needs known. Denies SOB and nausea. Reports moderate-severe ongoing generalized shoulder and neck pain; administered scheduled tylenol, gabapentin, and PRN Oxycodone with reports of moderate relief. No signs or symptoms of infection noted. Surgical dressing C/D/I; no signs of bleeding noted. CMS intact. Neck brace on in bed overnight. Voiding well; no BM. Sleeping between cares.

## 2024-06-29 NOTE — PROGRESS NOTES
NEUROSURGERY PROGRESS NOTE    Today's date:  06/29/24        ASSESSMENT:     POD #3 Right open-door laminoplasty at C5-C6 with bilateral foraminotomies with Dr. Fernandez on 6/26/24   -Drain removed 6/28, suture placed    -Neuro stable     -XR completed       TODAY'S PLAN:   No change in neurosurgery plan   -Collar to be worn at all times, may remove 2x daily for skin checks  -Appreciate involvement of therapies and hospitalist   -Routine incision cares   -She has been started on proph Lovenox     TCU recommended by therapies, awaiting placement. Appreciaite Care Management involvement. As drain is removed and pain is adequately controlled with PO meds, okay to discharge to TCU from neurosurgery standpoint once placement is arranged.         Griselda Guidry PA-C  [facility] Neurosurgery     Please page on-call team with additional questions or concerns.   Updated Dr. Cameron.   ________________________________________________________________       Subjective:   Pain level is slightly improved today. Pain is usually at its worst in AM when she gets out of bed, better today compared to yesterday. Primarily posterior neck pain. Some deep soreness throughout arms (stable from pre-op). Ongoing bilateral thumb and great toe numbness (stable from pre-op). Wearing cervical collar, agreeable to TCU placement.        Objective:   BP (!) 160/74 (BP Location: Right arm)   Pulse 67   Temp 100  F (37.8  C) (Oral)   Resp 20   Wt 71.3 kg (157 lb 3.2 oz)   SpO2 92%   BMI 27.85 kg/m       Pt in bed. Appears comfortable and in no apparent distress, moving all extremities.   CN II-XII intact, alert and appropriate with conversation and following commands.   Wearing cervical collar, posterior bandage is clean (no evidence outline of drainage)   Bilateral upper and lower extremities with appropriate strength, frailty noted.   Sensation intact throughout with exception of bilateral great toes and bilateral thumbs.  Calves soft  and non-tender bilaterally.      Pertinent labs reviewed in EPIC.      XR Cervical spine (6/27/24)   INDICATION: sp laminoplasty  COMPARISON: October 17, 2023.                                                      IMPRESSION: Straightening of the normal cervical lordosis. Retrolisthesis C3 on C4 measuring 1-2 mm. Patient is status post right-sided C5 and C6 laminoplasty. A surgical drain is present in the dorsal paraspinal soft tissues. Vertebral body heights are   maintained. No anterior compression fracture. Advanced C5-6 disc height loss. Mild disc degeneration elsewhere. Moderate cervical facet arthrosis. Pulmonary apices are clear.

## 2024-06-29 NOTE — PLAN OF CARE
Problem: Adult Inpatient Plan of Care  Goal: Plan of Care Review  Description: The Plan of Care Review/Shift note should be completed every shift.  The Outcome Evaluation is a brief statement about your assessment that the patient is improving, declining, or no change.  This information will be displayed automatically on your shift  note.  Outcome: Progressing   Goal Outcome Evaluation:         Pt has been up in chair and walking in sandy. Pt was able to have small BM today. Pt taking oxycodone for pain. Dressing changed

## 2024-06-30 PROCEDURE — 120N000001 HC R&B MED SURG/OB

## 2024-06-30 PROCEDURE — 250N000011 HC RX IP 250 OP 636

## 2024-06-30 PROCEDURE — 250N000013 HC RX MED GY IP 250 OP 250 PS 637

## 2024-06-30 PROCEDURE — 250N000013 HC RX MED GY IP 250 OP 250 PS 637: Performed by: INTERNAL MEDICINE

## 2024-06-30 PROCEDURE — 250N000011 HC RX IP 250 OP 636: Performed by: INTERNAL MEDICINE

## 2024-06-30 PROCEDURE — 250N000011 HC RX IP 250 OP 636: Performed by: PHYSICIAN ASSISTANT

## 2024-06-30 PROCEDURE — 99232 SBSQ HOSP IP/OBS MODERATE 35: CPT | Performed by: INTERNAL MEDICINE

## 2024-06-30 RX ORDER — PANTOPRAZOLE SODIUM 40 MG/1
40 TABLET, DELAYED RELEASE ORAL
Status: DISCONTINUED | OUTPATIENT
Start: 2024-06-30 | End: 2024-07-02 | Stop reason: HOSPADM

## 2024-06-30 RX ORDER — KETOROLAC TROMETHAMINE 15 MG/ML
15 INJECTION, SOLUTION INTRAMUSCULAR; INTRAVENOUS EVERY 8 HOURS SCHEDULED
Status: COMPLETED | OUTPATIENT
Start: 2024-06-30 | End: 2024-07-01

## 2024-06-30 RX ORDER — DEXAMETHASONE SODIUM PHOSPHATE 10 MG/ML
4 INJECTION, SOLUTION INTRAMUSCULAR; INTRAVENOUS EVERY 12 HOURS
Status: COMPLETED | OUTPATIENT
Start: 2024-06-30 | End: 2024-07-01

## 2024-06-30 RX ADMIN — GABAPENTIN 900 MG: 300 CAPSULE ORAL at 21:30

## 2024-06-30 RX ADMIN — KETOROLAC TROMETHAMINE 15 MG: 15 INJECTION, SOLUTION INTRAMUSCULAR; INTRAVENOUS at 21:30

## 2024-06-30 RX ADMIN — ACETAMINOPHEN 650 MG: 325 TABLET ORAL at 04:41

## 2024-06-30 RX ADMIN — GABAPENTIN 900 MG: 300 CAPSULE ORAL at 13:57

## 2024-06-30 RX ADMIN — KETOROLAC TROMETHAMINE 15 MG: 15 INJECTION, SOLUTION INTRAMUSCULAR; INTRAVENOUS at 13:55

## 2024-06-30 RX ADMIN — OXYCODONE HYDROCHLORIDE 5 MG: 5 TABLET ORAL at 07:59

## 2024-06-30 RX ADMIN — THERA TABS 1 TABLET: TAB at 08:03

## 2024-06-30 RX ADMIN — DEXAMETHASONE SODIUM PHOSPHATE 4 MG: 10 INJECTION, SOLUTION INTRAMUSCULAR; INTRAVENOUS at 12:40

## 2024-06-30 RX ADMIN — SENNOSIDES AND DOCUSATE SODIUM 1 TABLET: 50; 8.6 TABLET ORAL at 08:03

## 2024-06-30 RX ADMIN — PANTOPRAZOLE SODIUM 40 MG: 40 TABLET, DELAYED RELEASE ORAL at 13:57

## 2024-06-30 RX ADMIN — POLYETHYLENE GLYCOL 3350 17 G: 17 POWDER, FOR SOLUTION ORAL at 08:03

## 2024-06-30 RX ADMIN — GABAPENTIN 900 MG: 300 CAPSULE ORAL at 08:03

## 2024-06-30 RX ADMIN — HYDRALAZINE HYDROCHLORIDE 10 MG: 20 INJECTION INTRAMUSCULAR; INTRAVENOUS at 16:28

## 2024-06-30 RX ADMIN — ATORVASTATIN CALCIUM 20 MG: 10 TABLET, FILM COATED ORAL at 08:03

## 2024-06-30 RX ADMIN — ACETAMINOPHEN 650 MG: 325 TABLET ORAL at 17:42

## 2024-06-30 RX ADMIN — SENNOSIDES AND DOCUSATE SODIUM 1 TABLET: 50; 8.6 TABLET ORAL at 21:30

## 2024-06-30 RX ADMIN — ENOXAPARIN SODIUM 40 MG: 40 INJECTION SUBCUTANEOUS at 17:42

## 2024-06-30 RX ADMIN — OXYCODONE HYDROCHLORIDE 5 MG: 5 TABLET ORAL at 01:13

## 2024-06-30 ASSESSMENT — ACTIVITIES OF DAILY LIVING (ADL)
ADLS_ACUITY_SCORE: 39
ADLS_ACUITY_SCORE: 39
ADLS_ACUITY_SCORE: 33
ADLS_ACUITY_SCORE: 35
ADLS_ACUITY_SCORE: 39
ADLS_ACUITY_SCORE: 33
ADLS_ACUITY_SCORE: 35
ADLS_ACUITY_SCORE: 39
ADLS_ACUITY_SCORE: 33
ADLS_ACUITY_SCORE: 39
ADLS_ACUITY_SCORE: 33
ADLS_ACUITY_SCORE: 39
ADLS_ACUITY_SCORE: 39
ADLS_ACUITY_SCORE: 35
ADLS_ACUITY_SCORE: 35
ADLS_ACUITY_SCORE: 39
ADLS_ACUITY_SCORE: 39
ADLS_ACUITY_SCORE: 35
ADLS_ACUITY_SCORE: 33
ADLS_ACUITY_SCORE: 33
ADLS_ACUITY_SCORE: 35

## 2024-06-30 NOTE — PROGRESS NOTES
NEUROSURGERY PROGRESS NOTE    Today's date:  06/30/24        ASSESSMENT:     POD #4 Right open-door laminoplasty at C5-C6 with bilateral foraminotomies with Dr. Fernandez on 6/26/24   -Drain removed 6/28, suture placed    -Neuro stable     -XR completed       TODAY'S PLAN:   -Toradol 15 mg TID x3 doses (normal kidney function, sent page to hospitalist with update of new meds)  -Decadron 4 mg BID x3 doses   -Continue scheduled gabapentin and PRN oxycodone  -No muscle relaxants given hx of hallucinations  -Consider pain consult tomorrow if symptoms remain uncontrolled   -Collar to be worn at all times, may remove 2x daily for skin checks  -Appreciate involvement of therapies and hospitalist   -Routine incision cares   -She has been started on proph Lovenox     Plan for TCU at discharge. Not ready for discharge today as pain is suboptimally controlled. Likely in next 1-2 days.       Griselda Guidry PA-C  [facility]     Please page on-call team with additional questions or concerns.   Plan made with Dr. Fernandez   ________________________________________________________________       Subjective:   Feeling poorly when seen this morning. Resting in bed with collar on. Terrible headache, not positional (she felt HA was better for a time when up in chair earlier this AM). Posterior neck pain extending into the bilateral shoulders. Soreness throughout upper extremities. Ongoing numbness in bilateral fingers (stable from pre-op).  No lower extremity symptoms. She feels her pain remains suboptimally controlled. Doesn't tolerate muscle relaxants d/t hallucinations.       Objective:   BP (!) 140/69 (BP Location: Right arm)   Pulse 77   Temp 97.9  F (36.6  C) (Oral)   Resp 18   Wt 71.3 kg (157 lb 3.2 oz)   SpO2 93%   BMI 27.85 kg/m       Pt in bed. Appears comfortable and in no apparent distress, moving all extremities.   CN II-XII intact, alert and appropriate with conversation and following commands.    Wearing cervical collar  Bilateral upper and lower extremities with appropriate strength, frailty noted but no focal weakness  Sensation intact throughout with exception of bilateral great toes and bilateral thumbs.  Calves soft and non-tender bilaterally.      Pertinent labs reviewed in EPIC.      XR Cervical spine (6/27/24)   INDICATION: sp laminoplasty  COMPARISON: October 17, 2023.                                                      IMPRESSION: Straightening of the normal cervical lordosis. Retrolisthesis C3 on C4 measuring 1-2 mm. Patient is status post right-sided C5 and C6 laminoplasty. A surgical drain is present in the dorsal paraspinal soft tissues. Vertebral body heights are   maintained. No anterior compression fracture. Advanced C5-6 disc height loss. Mild disc degeneration elsewhere. Moderate cervical facet arthrosis. Pulmonary apices are clear.

## 2024-06-30 NOTE — PLAN OF CARE
Problem: Adult Inpatient Plan of Care  Goal: Plan of Care Review  Description: The Plan of Care Review/Shift note should be completed every shift.  The Outcome Evaluation is a brief statement about your assessment that the patient is improving, declining, or no change.  This information will be displayed automatically on your shift  note.  Outcome: Progressing     Problem: Risk for Delirium  Goal: Improved Sleep  Outcome: Progressing     Problem: Spinal Surgery  Goal: Absence of Bleeding  Outcome: Progressing     Problem: Spinal Surgery  Goal: Effective Bowel Elimination  Outcome: Progressing     Problem: Spinal Surgery  Goal: Absence of Infection Signs and Symptoms  Outcome: Progressing     Problem: Spinal Surgery  Goal: Optimal Neurologic Function  Intervention: Optimize Neurologic Function  Recent Flowsheet Documentation  Taken 6/30/2024 0025 by Brian Raymundo RN  Body Position:   position changed independently   supine, head elevated     Problem: Spinal Surgery  Goal: Optimal Pain Control and Function  Intervention: Prevent or Manage Pain  Recent Flowsheet Documentation  Taken 6/30/2024 0025 by Brian Raymundo RN  Pain Management Interventions: medication (see MAR)     Problem: Spinal Surgery  Goal: Nausea and Vomiting Relief  Outcome: Progressing     Problem: Spinal Surgery  Goal: Effective Urinary Elimination  Outcome: Progressing     Problem: Pain Acute  Goal: Optimal Pain Control and Function  Outcome: Progressing  Intervention: Develop Pain Management Plan  Recent Flowsheet Documentation  Taken 6/30/2024 0025 by Brian Raymundo RN  Pain Management Interventions: medication (see MAR)    Goal Outcome Evaluation:    Patient AOX4. Able to make needs known. Denies SOB and nausea. Reports moderate-severe ongoing generalized shoulder and neck pain; administered scheduled tylenol, gabapentin, and PRN Oxycodone with reports of minimal relief. No signs or symptoms of infection noted. Surgical dressing  C/D/I; no signs of bleeding noted. CMS intact. Neck brace on in bed overnight. Voiding well; no BM. Sleeping between cares.

## 2024-06-30 NOTE — PROGRESS NOTES
"[facility]    Medicine Progress Note - Hospitalist Service    Date of Admission:  6/26/2024    Assessment & Plan   78-year-old female with history of intractable neck pain, cervical radiculopathy and cervical stenosis, macular degeneration, osteoporosis, hyperlipidemia presents for laminoplasty at C5/C6 with bilateral foraminotomies.  [facility] consulted for medical management.     EUGENE drain was removed on 6/20/2024.  She is waiting for TCU placement.    History of cervical radiculopathy, cervical stenosis, cervical spondylosis and myelopathy  -- Status post laminoplasty at C5/6 with bilateral foraminotomies 6/26/2024  -- Postop cares per neurosurgery  --Continue methocarbamol  --Analgesics as needed; started on ketorolac and decadron on 6/30/24  --PT/OT  --Keep neck collar in place  --EUGENE drain management per neurosurgery service.     Peripheral neuropathy  Bilateral lower extremity numbness  --Continue PTA gabapentin 900 mg 3 times daily    Hyperlipidemia  --Continue atorvastatin 20 mg daily    GI prophylaxis   Added pantoprazole to prevent stress ulcer    Diet: Advance Diet as Tolerated: Regular Diet Adult    DVT Prophylaxis: Enoxaparin (Lovenox) SQ  Szymanski Catheter: Not present  Lines: None     Cardiac Monitoring: None  Code Status: Full Code      Clinically Significant Risk Factors                               # Overweight: Estimated body mass index is 27.85 kg/m  as calculated from the following:    Height as of 6/20/24: 1.6 m (5' 3\").    Weight as of this encounter: 71.3 kg (157 lb 3.2 oz).        # Financial/Environmental Concerns: none         Disposition Plan     Medically Ready for Discharge: Anticipated Tomorrow             Jaret German MD  Hospitalist Service  [facility]  Securely message with [employer] (more info)  Text page via [employer] Paging/Directory   ______________________________________________________________________    Interval History   No new " complaints today and no acute events overnight. Pain is uncontrolled. Awaiting TCU placement.     Physical Exam   Vital Signs: Temp: 97.9  F (36.6  C) Temp src: Oral BP: (!) 140/69 Pulse: 77   Resp: 18 SpO2: 93 % O2 Device: None (Room air)    Weight: 157 lbs 3.2 oz      General appearance: Elderly woman sitting comfortably on the recliner, with neck collar in place, awake, Alert, Cooperative, not in any obvious distress and appears stated age   HEENT: Normocephalic, atraumatic, conjunctiva clear without icterus and ears without discharge neck  Neck: Neck collar with EUGENE drain in place draining bloody fluid.  Lungs: Clear to auscultation bilaterally, no wheezing, good air exchange, normal work of breathing  Cardiovascular: Regular Rate and Rythm, normal apical impulse, normal S1 and S2, no lower extremity edema bilaterally  Abdomen: Soft, non-tender and Non-distended, active bowel sounds  Skin: Skin color, texture normal and bruising or bleeding. No rashes or lesions over face, neck, arms and legs, turgor normal.  Musculoskeletal: No bony deformities or joint tenderness. Normal ROM upon flexion & extension.   Neurologic: Alert & Oriented X 3, Facial symmetry preserved and upper & lower extremities moving well with symmetry  Psychiatric: Calm, normal eye contact and normal affect      Medical Decision Making       40 MINUTES SPENT BY ME on the date of service doing chart review, history, exam, documentation & further activities per the note.      Data   Imaging results reviewed over the past 24 hrs:   No results found for this or any previous visit (from the past 24 hour(s)).

## 2024-06-30 NOTE — PLAN OF CARE
"  Problem: Adult Inpatient Plan of Care  Goal: Plan of Care Review  Description: The Plan of Care Review/Shift note should be completed every shift.  The Outcome Evaluation is a brief statement about your assessment that the patient is improving, declining, or no change.  This information will be displayed automatically on your shift  note.  Outcome: Progressing  Goal: Patient-Specific Goal (Individualized)  Description: You can add care plan individualizations to a care plan. Examples of Individualization might be:  \"Parent requests to be called daily at 9am for status\", \"I have a hard time hearing out of my right ear\", or \"Do not touch me to wake me up as it startles  me\".  Outcome: Progressing  Goal: Absence of Hospital-Acquired Illness or Injury  Outcome: Progressing  Intervention: Prevent Skin Injury  Recent Flowsheet Documentation  Taken 6/30/2024 1309 by Ivelisse Gaspar RN  Body Position: turned  Taken 6/30/2024 0909 by Ivelisse Gaspar RN  Body Position: sitting up in bed  Intervention: Prevent Infection  Recent Flowsheet Documentation  Taken 6/30/2024 0739 by Ivelisse Gaspar RN  Infection Prevention: hand hygiene promoted  Goal: Optimal Comfort and Wellbeing  Outcome: Progressing  Goal: Readiness for Transition of Care  Outcome: Progressing     Problem: Risk for Delirium  Goal: Optimal Coping  Outcome: Progressing  Goal: Improved Behavioral Control  Outcome: Progressing  Intervention: Minimize Safety Risk  Recent Flowsheet Documentation  Taken 6/30/2024 0739 by Ivelisse Gaspar RN  Communication Enhancement Strategies: call light answered in person  Goal: Improved Attention and Thought Clarity  Outcome: Progressing  Intervention: Maximize Cognitive Function  Recent Flowsheet Documentation  Taken 6/30/2024 0739 by Ivelisse Gaspar RN  Reorientation Measures:   calendar in view   clock in view  Goal: Improved Sleep  Outcome: Progressing     Problem: Spinal Surgery  Goal: Optimal Coping with " Surgery  Outcome: Progressing  Goal: Absence of Bleeding  Outcome: Progressing  Goal: Effective Bowel Elimination  Outcome: Progressing  Goal: Fluid and Electrolyte Balance  Outcome: Progressing  Goal: Optimal Functional Ability  Outcome: Progressing  Intervention: Optimize Functional Status  Recent Flowsheet Documentation  Taken 6/30/2024 1109 by Ivelisse Gaspar RN  Positioning/Transfer Devices:   pillows   in use  Taken 6/30/2024 0909 by Ivelisse Gaspar RN  Positioning/Transfer Devices:   pillows   in use  Goal: Absence of Infection Signs and Symptoms  Outcome: Progressing  Intervention: Prevent or Manage Infection  Recent Flowsheet Documentation  Taken 6/30/2024 0739 by Ivelisse Gaspar RN  Infection Prevention: hand hygiene promoted  Goal: Optimal Neurologic Function  Outcome: Progressing  Intervention: Optimize Neurologic Function  Recent Flowsheet Documentation  Taken 6/30/2024 1309 by Ivelisse Gaspar RN  Body Position: turned  Taken 6/30/2024 0909 by Ivelisse Gaspar RN  Body Position: sitting up in bed  Goal: Anesthesia/Sedation Recovery  Outcome: Progressing  Intervention: Optimize Anesthesia Recovery  Recent Flowsheet Documentation  Taken 6/30/2024 0739 by Ivelisse Gaspar RN  Reorientation Measures:   calendar in view   clock in view  Goal: Optimal Pain Control and Function  Outcome: Progressing  Goal: Nausea and Vomiting Relief  Outcome: Progressing  Goal: Effective Urinary Elimination  Outcome: Progressing  Goal: Effective Oxygenation and Ventilation  Outcome: Progressing  Intervention: Optimize Oxygenation and Ventilation  Recent Flowsheet Documentation  Taken 6/30/2024 1309 by Ivelisse Gaspar RN  Head of Bed (HOB) Positioning: HOB at 20-30 degrees  Taken 6/30/2024 1109 by Ivelisse Gaspar RN  Head of Bed (HOB) Positioning: HOB at 20-30 degrees  Taken 6/30/2024 0909 by Ivelisse Gaspar RN  Head of Bed (HOB) Positioning: HOB at 30-45 degrees     Problem: Pain Acute  Goal:  Optimal Pain Control and Function  Outcome: Progressing   Goal Outcome Evaluation:         Pt is alert and oriented x4, able to communicate her needs, up in the chair for meal, SBA with walker to the bathroom. Pt is incontinent with B and B. Pain was managed with oxycodone, scheduled Toradol. VSS and will continue to monitor.

## 2024-06-30 NOTE — PROGRESS NOTES
Care Management Follow Up    Length of Stay (days): 4    Expected Discharge Date: 07/01/2024     Concerns to be Addressed: discharge planning     Patient plan of care discussed at interdisciplinary rounds:Yes    Anticipated Discharge Disposition: TCU   Anticipated Discharge Services: None  Anticipated Discharge DME: None    Patient/family educated on Medicare website which has current facility and service quality ratings: Yes  Education Provided on the Discharge Plan: Yes  Patient/Family in Agreement with the Plan: Yes    Referrals Placed by CM/SW: TCU  Private pay costs discussed: Not applicable    Additional Information:  [facility] received call from son Jagdish, indicated that if TCU is not finalized yet Pt would like referral sent to [facility]. Verified that there is no accepting TCU yet, referrals still pending. Upon review, noted that [facility] previously declined due to no availability. [facility] sent referral to [facility] as requested in case of updated availability, will await response.     SARIKA Chin

## 2024-06-30 NOTE — OP NOTE
NEUROSURGERY OPERATIVE REPORT     DATE OF SERVICE: 6/26/2024      PREOPERATIVE DIAGNOSES:  Cervical radiculopathy   Cervical stenosis of spinal canal [M48.02]  Cervical spondylosis with myelopathy    POSTOPERATIVE DIAGNOSES:  same    PROCEDURE:   1.  Right cervical 5-cervical 6 open door laminoplasty    2.  Foraminotomies at bilateral cervical 5-cervical 6  3.  Use of operating room microscope.     SURGEON: Karishma Fernandez MD    ASSISTANT: Paulette Lua PA-C     INDICATIONS:  Jesika Paez is a 79 yo female who present with neck and arm pain.  X-rays show straightening of her normal lordosis.  Retrolisthesis in extension at cervical 3-4 as well as cervical 4-5.  No other spondylolisthesis.  MRI of her cervical spine shows moderate spinal canal stenosis at cervical 5-6 with severe foraminal narrowing.  There is mild spinal canal stenosis at cervical 3-4 with mild bilateral foraminal narrowing.  Also she has mild left foraminal narrowing at cervical 6-7.    We discussed a right open door laminoplasty at cervical 5-cervical 6 with bilateral foraminotomies.  Risks and benefits were discussed in detail including but not limited to infection, hematoma, nerve damage including paralysis, post op radiculitis, durotomy, post laminoplasty kyphosis, neck pain, hardware malfunction, risks associated with the use of general anesthesia. She agreed to proceed.     PROCEDURE:  After obtaining informed consent, the patient was brought to the operating room with pneumatic stockings in place.  IV antibiotics was administered.  She was intubated under general endotracheal anesthesia. She  was positioned in a Simpson head couch, turned into the prone position on the radiolucent laminectomy frame, and secured to the operating room table with his neck in a neutral position. A foot board was positioned at the end of the table which was then turned into reverse trendelenburg. She was shaved, prepped and draped in the  usual sterile fashion.        A pre incisional infiltration of local anesthetic was performed along the midline and the incision was opened sharply and extended down to the fascia.  The fascia was opened in one layer with monopolar electrocautery.  A subperiosteal dissection was undertaken to expose the lamina at cervical 5 and cervical 6.  We verified levels with a lateral x-ray.    We then exposed the thecal sac between cervical 4-5 and cervical 6-7.   We next drilled through and through the lamina on the patient's right down to the level of the ligamentum, which was elevated on a small blunt hook and cut to expose the underlying intact thecal sac was well exposed. Hemostasis was achieved.  We then performed our left foraminotomy. We were careful to remove sufficient bone to decompress the underlying nerve root but not so much as to compromise the hinge portion of the laminoplasty.     We then performed a symmetric trough on the patient's left, taking care to leave the cortical mantle of bone in place for the hinge portion of the laminoplasty. We then performed our left sided foraminotomy.  We then elevated the lamina from the patient's open side with an upbiting curette, accomplishing a greenstick fracture on the side of the hinge.   Once the canal was opened, the thecal sac immediately decompressed into the newly created space.  We next placed Medtronic titanium plates bent to the shape of the canal, secured to the lateral mass with screws and secured to the lamina with  screws at cervical 5 and cervical 6, a  parallel plate was placed at each level  until we had 2 parallel plates in place with a nice solid construct. Each screw was drilled individually and achieved good purchase. Any screw which did not achieve good purchase was immediately replaced by a rescue screw. Of note a small linear durotomy was encountered on the lateral dura during our left foraminotomy. At the end of the case, a duragen and duraseal  patch was placed. No csf was visualized to be leaking after this was completed.     Due to the nature and complexity of the case an assistant was required for the duration of the case for positioning, retraction, hemostasis, injection of local anesthetic, and retraction.    Once the construct was in good position, we copiously irrigated the incision with antibiotic-containing saline and achieved hemostasis. A drain was placed. We closed the incision with interrupted 0 Vicryl to approximate the fascia and muscle layer, inverted 2-0 stratafix to approximate the subcutaneous tissues and Ethilon to approximate the skin edges. Sponge and needle counts were correct prior to closure x2. Sterile dressings were placed.     Patient tolerated the procedure well, was taken to the recovery room at neurological baseline with no new deficits appreciated.     ESTIMATED BLOOD LOSS: 100 ml    SPECIMENS: none    FINDINGS: Neuro monitoring stable throughout procedure     IMPLANTS:   Implant Name Type Inv. Item Serial No.  Lot No. LRB No. Used Action   ALLOGRAFT DURAGEN PLUS 2 X 2 [id number] - [id number] Other ALLOGRAFT DURAGEN PLUS 2 X 2 [id number]  [employer] [id number] N/A 1 Implanted   2.0 x 5 mm bone screw Metallic Hardware/Ogilvie   [employer]  N/A 8 Implanted   IMP PLATE MEDT POST CERVICAL 10MM OVAL GRAFT [id number] - [id number] Metallic Hardware/Ogilvie IMP PLATE MEDT POST CERVICAL 10MM OVAL GRAFT [id number]  [employer]  N/A 2 Implanted     DRAINS: Brendan Fernandez MD    Cc: Abrahan Diez

## 2024-06-30 NOTE — PLAN OF CARE
Problem: Adult Inpatient Plan of Care  Goal: Plan of Care Review  6/29/2024 2356 by Adegun, Oluwadamilola, RN  Outcome: Progressing  Flowsheets (Taken 6/29/2024 2356)  Plan of Care Reviewed With: patient  Overall Patient Progress: improving  6/29/2024 2355 by Adegun, Oluwadamilola, RN  Outcome: Progressing     Problem: Adult Inpatient Plan of Care  Goal: Patient-Specific Goal (Individualized)  6/29/2024 2356 by Adegun, Oluwadamilola, RN  Outcome: Progressing  6/29/2024 2355 by Adegun, Oluwadamilola, RN  Outcome: Progressing     Problem: Adult Inpatient Plan of Care  Goal: Absence of Hospital-Acquired Illness or Injury  6/29/2024 2356 by Adegun, Oluwadamilola, RN  Outcome: Progressing  6/29/2024 2355 by Adegun, Oluwadamilola, RN  Outcome: Progressing  Intervention: Identify and Manage Fall Risk  Recent Flowsheet Documentation  Taken 6/29/2024 1610 by Adegun, Oluwadamilola, RN  Safety Promotion/Fall Prevention:   activity supervised   mobility aid in reach   nonskid shoes/slippers when out of bed   assistive device/personal items within reach     Problem: Adult Inpatient Plan of Care  Goal: Optimal Comfort and Wellbeing  6/29/2024 2356 by Adegun, Oluwadamilola, RN  Outcome: Progressing  6/29/2024 2355 by Adegun, Oluwadamilola, RN  Outcome: Progressing  Intervention: Monitor Pain and Promote Comfort  Recent Flowsheet Documentation  Taken 6/29/2024 2115 by Adegun, Oluwadamilola, RN  Pain Management Interventions: medication (see MAR)  Taken 6/29/2024 1830 by Adegun, Oluwadamilola, RN  Pain Management Interventions: medication (see MAR)     Problem: Adult Inpatient Plan of Care  Goal: Readiness for Transition of Care  6/29/2024 2356 by Adegun, Oluwadamilola, RN  Outcome: Progressing  6/29/2024 2355 by Adegun, Oluwadamilola, RN  Outcome: Progressing     Problem: Risk for Delirium  Goal: Improved Behavioral Control  6/29/2024 2356 by Adegun, Oluwadamilola, RN  Outcome: Progressing  6/29/2024 2355 by Mariano  Oluwadamilola, RN  Outcome: Progressing  Intervention: Minimize Safety Risk  Recent Flowsheet Documentation  Taken 6/29/2024 1610 by Adegun, Oluwadamilola, RN  Enhanced Safety Measures: pain management     Problem: Risk for Delirium  Goal: Improved Attention and Thought Clarity  6/29/2024 2356 by Adegun, Oluwadamilola, RN  Outcome: Progressing  6/29/2024 2355 by Adegun, Oluwadamilola, RN  Outcome: Progressing     Problem: Spinal Surgery  Goal: Optimal Coping with Surgery  6/29/2024 2356 by Adegun, Oluwadamilola, RN  Outcome: Progressing  6/29/2024 2355 by Adegun, Oluwadamilola, RN  Outcome: Progressing     Problem: Spinal Surgery  Goal: Absence of Bleeding  6/29/2024 2356 by Adegun, Oluwadamilola, RN  Outcome: Progressing  6/29/2024 2355 by Adegun, Oluwadamilola, RN  Outcome: Progressing     Problem: Spinal Surgery  Goal: Optimal Functional Ability  6/29/2024 2356 by Adegun, Oluwadamilola, RN  Outcome: Progressing  6/29/2024 2355 by Adegun, Oluwadamilola, RN  Outcome: Progressing     Problem: Spinal Surgery  Goal: Absence of Infection Signs and Symptoms  6/29/2024 2356 by Adegun, Oluwadamilola, RN  Outcome: Progressing  6/29/2024 2355 by Adegun, Oluwadamilola, RN  Outcome: Progressing     Problem: Spinal Surgery  Goal: Optimal Pain Control and Function  6/29/2024 2356 by Adegun, Oluwadamilola, RN  Outcome: Progressing  6/29/2024 2355 by Adegun, Oluwadamilola, RN  Outcome: Progressing  Intervention: Prevent or Manage Pain  Recent Flowsheet Documentation  Taken 6/29/2024 2115 by Adegun, Oluwadamilola, RN  Pain Management Interventions: medication (see MAR)  Taken 6/29/2024 1830 by Adegun, Oluwadamilola, RN  Pain Management Interventions: medication (see MAR)     Problem: Spinal Surgery  Goal: Effective Oxygenation and Ventilation  6/29/2024 2356 by Adegun, Oluwadamilola, RN  Outcome: Progressing  6/29/2024 2355 by Adegun, Oluwadamilola, RN  Outcome: Progressing   Goal Outcome Evaluation: Pt is alert and oriented, able  to make needs known. VSS, pain managed with scheduled meds and PRN oxy x1. Collar in place.       Plan of Care Reviewed With: patient    Overall Patient Progress: improvingOverall Patient Progress: improving

## 2024-06-30 NOTE — BRIEF OP NOTE
[facility]    Brief Operative Note    Pre-operative diagnosis: Cervical radiculopathy [M54.12]  Cervical stenosis of spinal canal [M48.02]  Cervical spondylosis with myelopathy [M47.12]  Post-operative diagnosis Same as pre-operative diagnosis    Procedure: RIGHT OPEN DOOR LAMINOPLASTY AT CERVICAL 5 - CERVICAL 6 WITH BILATERAL FORAMINOTOMIES, Bilateral - Spine    Surgeon: Surgeons and Role:     * Karishma Fernandez MD - Primary     * Paulette Lua PA-C  Anesthesia: General   Estimated Blood Loss: 100 mL from 6/26/2024 12:36 PM to 6/26/2024  4:21 PM      Drains: Lee-Prince  Specimens: * No specimens in log *  Findings:   Neuro monitoring stable throughout procedure .  Complications: None.  Implants:   Implant Name Type Inv. Item Serial No.  Lot No. LRB No. Used Action   ALLOGRAFT DURAGEN PLUS 2 X 2 [id number] - [id number] Other ALLOGRAFT DURAGEN PLUS 2 X 2 [id number]  [employer] [id number] N/A 1 Implanted   2.0 x 5 mm bone screw Metallic Hardware/[place]   [employer]  N/A 8 Implanted   IMP PLATE MEDT POST CERVICAL 10MM OVAL GRAFT [id number] - [id number] Metallic Hardware/[place] IMP PLATE MEDT POST CERVICAL 10MM OVAL GRAFT [id number]  [employer]  N/A 2 Implanted

## 2024-07-01 ENCOUNTER — APPOINTMENT (OUTPATIENT)
Dept: PHYSICAL THERAPY | Facility: HOSPITAL | Age: 79
DRG: 519 | End: 2024-07-01
Attending: SURGERY
Payer: COMMERCIAL

## 2024-07-01 PROCEDURE — 250N000013 HC RX MED GY IP 250 OP 250 PS 637: Performed by: STUDENT IN AN ORGANIZED HEALTH CARE EDUCATION/TRAINING PROGRAM

## 2024-07-01 PROCEDURE — 97116 GAIT TRAINING THERAPY: CPT | Mod: GP

## 2024-07-01 PROCEDURE — 97110 THERAPEUTIC EXERCISES: CPT | Mod: GP

## 2024-07-01 PROCEDURE — 120N000001 HC R&B MED SURG/OB

## 2024-07-01 PROCEDURE — 250N000013 HC RX MED GY IP 250 OP 250 PS 637: Performed by: INTERNAL MEDICINE

## 2024-07-01 PROCEDURE — 250N000013 HC RX MED GY IP 250 OP 250 PS 637

## 2024-07-01 PROCEDURE — 250N000011 HC RX IP 250 OP 636

## 2024-07-01 PROCEDURE — 250N000011 HC RX IP 250 OP 636: Performed by: PHYSICIAN ASSISTANT

## 2024-07-01 PROCEDURE — 99232 SBSQ HOSP IP/OBS MODERATE 35: CPT | Performed by: STUDENT IN AN ORGANIZED HEALTH CARE EDUCATION/TRAINING PROGRAM

## 2024-07-01 RX ORDER — LISINOPRIL 2.5 MG/1
2.5 TABLET ORAL DAILY
Status: SHIPPED
Start: 2024-07-01 | End: 2024-07-26

## 2024-07-01 RX ORDER — LISINOPRIL 2.5 MG/1
2.5 TABLET ORAL DAILY
Status: DISCONTINUED | OUTPATIENT
Start: 2024-07-01 | End: 2024-07-02 | Stop reason: HOSPADM

## 2024-07-01 RX ADMIN — SENNOSIDES AND DOCUSATE SODIUM 1 TABLET: 50; 8.6 TABLET ORAL at 09:02

## 2024-07-01 RX ADMIN — POLYETHYLENE GLYCOL 3350 17 G: 17 POWDER, FOR SOLUTION ORAL at 09:01

## 2024-07-01 RX ADMIN — PANTOPRAZOLE SODIUM 40 MG: 40 TABLET, DELAYED RELEASE ORAL at 06:41

## 2024-07-01 RX ADMIN — ATORVASTATIN CALCIUM 20 MG: 10 TABLET, FILM COATED ORAL at 09:01

## 2024-07-01 RX ADMIN — LISINOPRIL 2.5 MG: 2.5 TABLET ORAL at 16:08

## 2024-07-01 RX ADMIN — GABAPENTIN 900 MG: 300 CAPSULE ORAL at 21:38

## 2024-07-01 RX ADMIN — GABAPENTIN 900 MG: 300 CAPSULE ORAL at 09:01

## 2024-07-01 RX ADMIN — GABAPENTIN 900 MG: 300 CAPSULE ORAL at 14:41

## 2024-07-01 RX ADMIN — SENNOSIDES AND DOCUSATE SODIUM 1 TABLET: 50; 8.6 TABLET ORAL at 21:39

## 2024-07-01 RX ADMIN — DEXAMETHASONE SODIUM PHOSPHATE 4 MG: 10 INJECTION, SOLUTION INTRAMUSCULAR; INTRAVENOUS at 01:14

## 2024-07-01 RX ADMIN — KETOROLAC TROMETHAMINE 15 MG: 15 INJECTION, SOLUTION INTRAMUSCULAR; INTRAVENOUS at 06:41

## 2024-07-01 RX ADMIN — THERA TABS 1 TABLET: TAB at 09:02

## 2024-07-01 RX ADMIN — DEXAMETHASONE SODIUM PHOSPHATE 4 MG: 10 INJECTION, SOLUTION INTRAMUSCULAR; INTRAVENOUS at 14:41

## 2024-07-01 RX ADMIN — ENOXAPARIN SODIUM 40 MG: 40 INJECTION SUBCUTANEOUS at 18:19

## 2024-07-01 ASSESSMENT — ACTIVITIES OF DAILY LIVING (ADL)
ADLS_ACUITY_SCORE: 33

## 2024-07-01 NOTE — PLAN OF CARE
Problem: Adult Inpatient Plan of Care  Goal: Plan of Care Review  Description: The Plan of Care Review/Shift note should be completed every shift.  The Outcome Evaluation is a brief statement about your assessment that the patient is improving, declining, or no change.  This information will be displayed automatically on your shift  note.  7/1/2024 1641 by Gordy Wahl, RN  Outcome: Progressing     Problem: Adult Inpatient Plan of Care  Goal: Absence of Hospital-Acquired Illness or Injury  7/1/2024 1641 by Goryd Wahl, RN  Outcome: Progressing     Problem: Adult Inpatient Plan of Care  Goal: Absence of Hospital-Acquired Illness or Injury  Intervention: Identify and Manage Fall Risk  Recent Flowsheet Documentation  Taken 7/1/2024 0800 by Gordy Wahl, RN  Safety Promotion/Fall Prevention: activity supervised   Goal Outcome Evaluation:  Patient  alert oriented x 4, able to express needs, pain 2/10, neck brace in place, will discharge tomorrow.

## 2024-07-01 NOTE — PROGRESS NOTES
Care Management Follow Up    Length of Stay (days): 5    Expected Discharge Date: 07/01/2024    Anticipated Discharge Plan:       Transportation: TBD    PT Recommendations: Transitional Care Facility  OT Recommendations:  Transitional Care Facility     Barriers to Discharge: placement    Prior Living Situation: apartment with alone    Advanced Directive on File: verified with patient     Patient/Spokesperson Updated: No    Additional Information:  Discharge order placed. RNCM sent page to Ankur ZEPEDA regarding lack of TCU placement until tomorrow.     Discussed with hospitalist [facility] able to accept tomorrow.    [facility] able to accept patient at TCU tomorrow.    RNCM met with, updated on TCU acceptance at [facility], Patient was very happy/ pleased with news. Discussed transportation patient stated that her son would transport her to TCU.     Per TCU patient would need to arrive between 1-1:30PM or between 3-7PM. RNCM discussed with patient to coordinate with her son on the time, patient's son will transport at 330PM tomorrow, planning to be here around 3 to go over discharge information. Discussed with Jagdish via phone in patient's room.    CM will continue to follow care progression and aide in discharge planning as needed.     11:27 AM - PAS Completed - [id number]    Kaur Pollard RN

## 2024-07-01 NOTE — PROGRESS NOTES
Neurosurgery progress note:     NSGY notified patient is planning on staying today due to TCU not being ready until tomorrow. Will continue with current plan as previously outlined. Please defer to discharge summary for daily exam.     Paulette Lua PA-C  [facility]  [address] [place] [address]  [phone]

## 2024-07-01 NOTE — PLAN OF CARE
"  Problem: Adult Inpatient Plan of Care  Goal: Plan of Care Review  Description: The Plan of Care Review/Shift note should be completed every shift.  The Outcome Evaluation is a brief statement about your assessment that the patient is improving, declining, or no change.  This information will be displayed automatically on your shift  note.  Outcome: Progressing  Goal: Patient-Specific Goal (Individualized)  Description: You can add care plan individualizations to a care plan. Examples of Individualization might be:  \"Parent requests to be called daily at 9am for status\", \"I have a hard time hearing out of my right ear\", or \"Do not touch me to wake me up as it startles  me\".  Outcome: Progressing  Goal: Absence of Hospital-Acquired Illness or Injury  Outcome: Progressing  Intervention: Identify and Manage Fall Risk  Recent Flowsheet Documentation  Taken 7/1/2024 0100 by Hiral Marino RN  Safety Promotion/Fall Prevention:   activity supervised   mobility aid in reach   nonskid shoes/slippers when out of bed   assistive device/personal items within reach  Intervention: Prevent Skin Injury  Recent Flowsheet Documentation  Taken 7/1/2024 0100 by Hiral Marino RN  Body Position: position changed independently  Intervention: Prevent Infection  Recent Flowsheet Documentation  Taken 7/1/2024 0100 by Hiral Marino RN  Infection Prevention: hand hygiene promoted  Goal: Optimal Comfort and Wellbeing  Outcome: Progressing  Goal: Readiness for Transition of Care  Outcome: Progressing     Problem: Risk for Delirium  Goal: Optimal Coping  Outcome: Progressing  Goal: Improved Behavioral Control  Outcome: Progressing  Intervention: Minimize Safety Risk  Recent Flowsheet Documentation  Taken 7/1/2024 0100 by Hiral Marino RN  Communication Enhancement Strategies: call light answered in person  Enhanced Safety Measures: pain management  Goal: Improved Attention and Thought Clarity  Outcome: Progressing  Intervention: Maximize " Cognitive Function  Recent Flowsheet Documentation  Taken 7/1/2024 0100 by Hiral Marino RN  Reorientation Measures:   calendar in view   clock in view  Goal: Improved Sleep  Outcome: Progressing     Problem: Spinal Surgery  Goal: Optimal Coping with Surgery  Outcome: Progressing  Goal: Absence of Bleeding  Outcome: Progressing  Goal: Effective Bowel Elimination  Outcome: Progressing  Goal: Fluid and Electrolyte Balance  Outcome: Progressing  Intervention: Monitor and Manage Fluid and Electrolyte Balance  Recent Flowsheet Documentation  Taken 7/1/2024 0100 by Hiral Marino RN  Fluid/Electrolyte Management: fluids provided  Goal: Optimal Functional Ability  Outcome: Progressing  Intervention: Optimize Functional Status  Recent Flowsheet Documentation  Taken 7/1/2024 0100 by Hiral Marino RN  Positioning/Transfer Devices:   pillows   in use  Goal: Absence of Infection Signs and Symptoms  Outcome: Progressing  Intervention: Prevent or Manage Infection  Recent Flowsheet Documentation  Taken 7/1/2024 0100 by Hiral Marino RN  Infection Prevention: hand hygiene promoted  Goal: Optimal Neurologic Function  Outcome: Progressing  Intervention: Optimize Neurologic Function  Recent Flowsheet Documentation  Taken 7/1/2024 0100 by Hiral Marino RN  Body Position: position changed independently  Goal: Anesthesia/Sedation Recovery  Outcome: Progressing  Intervention: Optimize Anesthesia Recovery  Recent Flowsheet Documentation  Taken 7/1/2024 0100 by Hiral Marino RN  Safety Promotion/Fall Prevention:   activity supervised   mobility aid in reach   nonskid shoes/slippers when out of bed   assistive device/personal items within reach  Reorientation Measures:   calendar in view   clock in view  Goal: Optimal Pain Control and Function  Outcome: Progressing  Goal: Nausea and Vomiting Relief  Outcome: Progressing  Goal: Effective Urinary Elimination  Outcome: Progressing  Goal: Effective Oxygenation and Ventilation  Outcome:  Progressing  Intervention: Optimize Oxygenation and Ventilation  Recent Flowsheet Documentation  Taken 7/1/2024 0100 by Hiral Marino RN  Head of Bed (HOB) Positioning: HOB at 30-45 degrees     Problem: Pain Acute  Goal: Optimal Pain Control and Function  Outcome: Progressing  Intervention: Prevent or Manage Pain  Recent Flowsheet Documentation  Taken 7/1/2024 0100 by Hiral Marino RN  Medication Review/Management: medications reviewed   Goal Outcome Evaluation:  Pt slept well overnight, awake with cares. Pt did not request any prn meds and pain was well controlled with sced decadron and toradol. Pt did state her neck pain is now down to a 3. Slight headache this am and stated her throat is dry. Pt is alert and able to make her needs known.

## 2024-07-01 NOTE — PLAN OF CARE
Problem: Adult Inpatient Plan of Care  Goal: Plan of Care Review  Outcome: Progressing  Flowsheets (Taken 6/30/2024 2053)  Plan of Care Reviewed With: patient     Problem: Adult Inpatient Plan of Care  Goal: Patient-Specific Goal (Individualized)  Outcome: Progressing     Problem: Adult Inpatient Plan of Care  Goal: Absence of Hospital-Acquired Illness or Injury  Outcome: Progressing  Intervention: Identify and Manage Fall Risk  Recent Flowsheet Documentation  Taken 6/30/2024 1630 by Adegun, Oluwadamilola, RN  Safety Promotion/Fall Prevention:   activity supervised   mobility aid in reach   nonskid shoes/slippers when out of bed   assistive device/personal items within reach     Problem: Adult Inpatient Plan of Care  Goal: Readiness for Transition of Care  Outcome: Progressing     Problem: Risk for Delirium  Goal: Improved Behavioral Control  Outcome: Progressing  Intervention: Minimize Safety Risk  Recent Flowsheet Documentation  Taken 6/30/2024 1630 by Adegun, Oluwadamilola, RN  Enhanced Safety Measures: pain management     Problem: Risk for Delirium  Goal: Improved Attention and Thought Clarity  Outcome: Progressing     Problem: Spinal Surgery  Goal: Absence of Bleeding  Outcome: Progressing     Problem: Spinal Surgery  Goal: Absence of Infection Signs and Symptoms  Outcome: Progressing     Problem: Spinal Surgery  Goal: Optimal Pain Control and Function  Outcome: Progressing  Intervention: Prevent or Manage Pain  Recent Flowsheet Documentation  Taken 6/30/2024 1742 by Adegun, Oluwadamilola, RN  Pain Management Interventions: medication (see MAR)  Taken 6/30/2024 1630 by Adegun, Oluwadamilola, RN  Pain Management Interventions: pain management plan reviewed with patient/caregiver   Goal Outcome Evaluation: Pt is alert and oriented, able to make needs known. VSS, pain managed with scheduled meds, PRN tylenol given for HA, relief reported, PRN hydralazine given for /72, improved upon recheck. Collar brace  in place, dressing on surgical site CDI.      Plan of Care Reviewed With: patient    Overall Patient Progress: improvingOverall Patient Progress: improving

## 2024-07-01 NOTE — PLAN OF CARE
"  Problem: Adult Inpatient Plan of Care  Goal: Plan of Care Review  Description: The Plan of Care Review/Shift note should be completed every shift.  The Outcome Evaluation is a brief statement about your assessment that the patient is improving, declining, or no change.  This information will be displayed automatically on your shift  note.  Outcome: Progressing     Problem: Adult Inpatient Plan of Care  Goal: Patient-Specific Goal (Individualized)  Description: You can add care plan individualizations to a care plan. Examples of Individualization might be:  \"Parent requests to be called daily at 9am for status\", \"I have a hard time hearing out of my right ear\", or \"Do not touch me to wake me up as it startles  me\".  Outcome: Progressing     Problem: Adult Inpatient Plan of Care  Goal: Absence of Hospital-Acquired Illness or Injury  Intervention: Identify and Manage Fall Risk  Recent Flowsheet Documentation  Taken 7/1/2024 0800 by Gordy Wahl, RN  Safety Promotion/Fall Prevention: activity supervised   Goal Outcome Evaluation:  Patient alert oriented x 4, able to verbalize needs, neck brace on, pain 2/10, discharge to TCU tomorrow, SBA ambulation in hallway.                      "

## 2024-07-01 NOTE — DISCHARGE SUMMARY
DISCHARGE SUMMARY   Patient ID:   Jesika Paez   [id number]   78 year old   1945     Admit date: 6/26/2024     Discharge date: 07/01/2024     Admission Diagnoses: Cervical radiculopathy [M54.12]  Cervical stenosis of spinal canal [M48.02]  Cervical spondylosis with myelopathy [M47.12]     Procedure:     1.  Right cervical 5-cervical 6 open door laminoplasty    2.  Foraminotomies at bilateral cervical 5-cervical 6  3.  Use of operating room microscope.     Post op Diagnosis:     Cervical radiculopathy   Cervical stenosis of spinal canal [M48.02]  Cervical spondylosis with myelopathy     Surgeon: Dr. Fernandez    Exam:   Pt in bed. She appears comfortable and in no apparent distress, moving all extremities.   CN II-XII intact, alert and appropriate with conversation and following commands.   Bilateral upper and lower extremities with appropriate strength. DTR's WNL. Spine is non tender to palpation throughout. Lynn's and Babinski sign neg. Sensation diminished in hands and feet. Collar intact.   Calves soft and non-tender bilaterally.     Cervical dressing changed. Incision intact. Absent for edema, erythema or drainage.   She is ambulating with a steady gait. Tolerating regular diet without nausea or vomiting. Pain managed with oral medication. Voiding without difficulty. She is on room air with O2 sats greater than 90%.     Disposition: TCU     Jesika Paez verbalizes understanding and is in agreement with discharge.     Done while pt still in hospital bed      Review of your medicines        START taking        Dose / Directions   acetaminophen 325 MG tablet  Commonly known as: TYLENOL      Dose: 975 mg  Take 3 tablets (975 mg) by mouth every 8 hours as needed for pain  Refills: 0     oxyCODONE 5 MG tablet  Commonly known as: ROXICODONE      Dose: 5-10 mg  Take 1-2 tablets (5-10 mg) by mouth every 4 hours as needed for severe pain  Quantity: 40 tablet  Refills: 0     senna-docusate 8.6-50 MG  tablet  Commonly known as: SENOKOT-S/PERICOLACE      Dose: 1 tablet  Take 1 tablet by mouth daily as needed for constipation (continue while taking oxycodone)  Quantity: 30 tablet  Refills: 0            CONTINUE these medicines which have NOT CHANGED        Dose / Directions   atorvastatin 20 MG tablet  Commonly known as: LIPITOR  Used for: Hyperlipidemia      Dose: 20 mg  Take 1 tablet (20 mg) by mouth daily  Quantity: 90 tablet  Refills: 3     gabapentin 300 MG capsule  Commonly known as: NEURONTIN  Used for: Cervical radiculopathy, Cervicalgia      Take 3 capsules by mouth 3 times a day.  Quantity: 270 capsule  Refills: 1               Where to get your medicines        Some of these will need a paper prescription and others can be bought over the counter. Ask your nurse if you have questions.    Bring a paper prescription for each of these medications  oxyCODONE 5 MG tablet  senna-docusate 8.6-50 MG tablet  You don't need a prescription for these medications  acetaminophen 325 MG tablet          Discharge Procedure Orders   General info for SNF   Order Comments: Length of Stay Estimate: Short Term Care: Estimated # of Days <30  Condition at Discharge: Improving  Level of care:skilled   Rehabilitation Potential: Good  Admission H&P remains valid and up-to-date: Yes  Recent Chemotherapy: N/A  Use Nursing Home Standing Orders: Yes     Mantoux instructions   Order Comments: Give two-step Mantoux (PPD) Per Facility Policy Yes     Follow Up and recommended labs and tests   Order Comments: Your Neurosurgical follow-up appointments have been scheduled. You may call [phone] to make, confirm or change your appointment dates and/or times.     Reason for your hospital stay   Order Comments: Right open-door laminoplasty at C5-C6 with bilateral foraminotomies with Dr. Fernandez on 6/26/24.     Wound care (specify)   Order Comments: Site:   Posterior cervical spine.   Instructions:  Please keep clean and dry. May shower but  no baths. Please avoid lotions and creams. Please leave open to air.     Activity - Up ad danish     Order Specific Question Answer Comments   Is discharge order? Yes      Wound care   Order Comments: Collar to be worn at all times, may remove 2x daily for skin checks.     Full Code     Order Specific Question Answer Comments   Code status determined by: Unable to discuss and no AD/POLST on file; continue PREVIOUSLY ORDERED code status      Physical Therapy Adult Consult   Order Comments: Evaluate and treat as clinically indicated.    Reason:  s/p cervical laminoplasty. Must wear collar at all times.     Occupational Therapy Adult Consult   Order Comments: Evaluate and treat as clinically indicated.    Reason:  s/p cervical laminoplasty. Must wear collar at all times.     Fall precautions     Diet   Order Comments: Follow this diet upon discharge: Advance to a regular diet as tolerated.     Order Specific Question Answer Comments   Is discharge order? Yes           Respectfully,   SHAN Samayoa, PAZaydaC

## 2024-07-01 NOTE — PROGRESS NOTES
"[facility]    Medicine Progress Note - Hospitalist Service    Date of Admission:  6/26/2024    Assessment & Plan   78-year-old female with history of intractable neck pain, cervical radiculopathy and cervical stenosis, macular degeneration, osteoporosis, hyperlipidemia presents for laminoplasty at C5/C6 with bilateral foraminotomies.  [facility] [place].     EUGENE drain was removed on 6/20/2024.  Planning for discharge to TCU on 7/2/24.    History of cervical radiculopathy, cervical stenosis, cervical spondylosis and myelopathy  -Status post laminoplasty at C5/6 with bilateral foraminotomies 6/26/2024  -Postop cares per neurosurgery  -Continue methocarbamol  -Analgesics as needed; started on ketorolac and decadron on 6/30/24  -PT/OT  -Keep neck collar in place    HTN  -patient intermittently HTN above goal  -will start low dose lisinopril 2.5mg daily  -monitor AM creatinine and potassium  -outpatient follow-up for dose adjustment      Peripheral neuropathy  Bilateral lower extremity numbness  -Continue PTA gabapentin 900 mg 3 times daily    Hyperlipidemia  -Continue atorvastatin 20 mg daily    GI prophylaxis   -Added pantoprazole to prevent stress ulcer    Diet: Advance Diet as Tolerated: Regular Diet Adult  Diet    DVT Prophylaxis: Enoxaparin (Lovenox) SQ  Szymanski Catheter: Not present  Lines: None     Cardiac Monitoring: None  Code Status: Full Code      Clinically Significant Risk Factors                               # Overweight: Estimated body mass index is 27.85 kg/m  as calculated from the following:    Height as of 6/20/24: 1.6 m (5' 3\").    Weight as of this encounter: 71.3 kg (157 lb 3.2 oz).        # Financial/Environmental Concerns: none         Disposition Plan     Medically Ready for Discharge: Anticipated Tomorrow             Feng Gallego DO  Hospitalist Service  [facility]  Securely message with [employer] (more info)  Text page via [employer] " Paging/Directory   ______________________________________________________________________    Interval History   Listening to book on tape during encounter. No complaints.     Physical Exam   Vital Signs: Temp: 98  F (36.7  C) Temp src: Oral BP: (!) 173/77 Pulse: 70   Resp: 18 SpO2: 96 % O2 Device: None (Room air)    Weight: 157 lbs 3.2 oz      General appearance: Elderly woman sitting comfortably on the recliner, with neck collar in place, awake, Alert, Cooperative, not in any obvious distress and appears stated age   HEENT: Normocephalic, atraumatic, conjunctiva clear without icterus and ears without discharge neck  Lungs: breathing comfortably on room air   Cardiovascular: Regular Rate and Rythm, normal apical impulse, normal S1 and S2, no lower extremity edema bilaterally  Abdomen: Soft, non-tender and Non-distended, active bowel sounds  Skin: Skin color, texture normal and bruising or bleeding. No rashes or lesions over face, neck, arms and legs, turgor normal.  Musculoskeletal: No bony deformities or joint tenderness. Normal ROM upon flexion & extension.   Neurologic: Alert & Oriented X 3, Facial symmetry preserved and upper & lower extremities moving well with symmetry  Psychiatric: Calm, normal eye contact and normal affect      Medical Decision Making       45 MINUTES SPENT BY ME on the date of service doing chart review, history, exam, documentation & further activities per the note.      Data   Imaging results reviewed over the past 24 hrs:   No results found for this or any previous visit (from the past 24 hour(s)).

## 2024-07-01 NOTE — PLAN OF CARE
Physical Therapy Discharge Summary    Reason for therapy discharge:    Discharged to transitional care facility.    Progress towards therapy goal(s). See goals on Care Plan in [facility] electronic health record for goal details.  Goals met    Therapy recommendation(s):    Continued therapy is recommended.  Rationale/Recommendations:  to improve safety and compliance with spinal precautions for discharge home..

## 2024-07-02 ENCOUNTER — LAB REQUISITION (OUTPATIENT)
Dept: LAB | Facility: CLINIC | Age: 79
End: 2024-07-02
Payer: COMMERCIAL

## 2024-07-02 ENCOUNTER — MEDICAL CORRESPONDENCE (OUTPATIENT)
Dept: HEALTH INFORMATION MANAGEMENT | Facility: CLINIC | Age: 79
End: 2024-07-02
Payer: COMMERCIAL

## 2024-07-02 VITALS
OXYGEN SATURATION: 97 % | RESPIRATION RATE: 20 BRPM | WEIGHT: 157.2 LBS | BODY MASS INDEX: 27.85 KG/M2 | SYSTOLIC BLOOD PRESSURE: 128 MMHG | TEMPERATURE: 98.3 F | HEART RATE: 67 BPM | DIASTOLIC BLOOD PRESSURE: 58 MMHG

## 2024-07-02 DIAGNOSIS — Z11.1 ENCOUNTER FOR SCREENING FOR RESPIRATORY TUBERCULOSIS: ICD-10-CM

## 2024-07-02 LAB
CREAT SERPL-MCNC: 0.64 MG/DL (ref 0.51–0.95)
EGFRCR SERPLBLD CKD-EPI 2021: 90 ML/MIN/1.73M2
PLATELET # BLD AUTO: 210 10E3/UL (ref 150–450)
POTASSIUM SERPL-SCNC: 4.5 MMOL/L (ref 3.4–5.3)

## 2024-07-02 PROCEDURE — 99232 SBSQ HOSP IP/OBS MODERATE 35: CPT | Performed by: STUDENT IN AN ORGANIZED HEALTH CARE EDUCATION/TRAINING PROGRAM

## 2024-07-02 PROCEDURE — 250N000013 HC RX MED GY IP 250 OP 250 PS 637

## 2024-07-02 PROCEDURE — 250N000013 HC RX MED GY IP 250 OP 250 PS 637: Performed by: INTERNAL MEDICINE

## 2024-07-02 PROCEDURE — 36415 COLL VENOUS BLD VENIPUNCTURE: CPT | Performed by: STUDENT IN AN ORGANIZED HEALTH CARE EDUCATION/TRAINING PROGRAM

## 2024-07-02 PROCEDURE — 84132 ASSAY OF SERUM POTASSIUM: CPT | Performed by: STUDENT IN AN ORGANIZED HEALTH CARE EDUCATION/TRAINING PROGRAM

## 2024-07-02 PROCEDURE — 250N000013 HC RX MED GY IP 250 OP 250 PS 637: Performed by: STUDENT IN AN ORGANIZED HEALTH CARE EDUCATION/TRAINING PROGRAM

## 2024-07-02 PROCEDURE — 85049 AUTOMATED PLATELET COUNT: CPT

## 2024-07-02 PROCEDURE — 82565 ASSAY OF CREATININE: CPT | Performed by: STUDENT IN AN ORGANIZED HEALTH CARE EDUCATION/TRAINING PROGRAM

## 2024-07-02 RX ADMIN — THERA TABS 1 TABLET: TAB at 09:17

## 2024-07-02 RX ADMIN — ATORVASTATIN CALCIUM 20 MG: 10 TABLET, FILM COATED ORAL at 09:17

## 2024-07-02 RX ADMIN — GABAPENTIN 900 MG: 300 CAPSULE ORAL at 09:16

## 2024-07-02 RX ADMIN — LISINOPRIL 2.5 MG: 2.5 TABLET ORAL at 09:17

## 2024-07-02 RX ADMIN — GABAPENTIN 900 MG: 300 CAPSULE ORAL at 14:09

## 2024-07-02 RX ADMIN — PANTOPRAZOLE SODIUM 40 MG: 40 TABLET, DELAYED RELEASE ORAL at 07:25

## 2024-07-02 ASSESSMENT — ACTIVITIES OF DAILY LIVING (ADL)
ADLS_ACUITY_SCORE: 33

## 2024-07-02 NOTE — PLAN OF CARE
Problem: Adult Inpatient Plan of Care  Goal: Absence of Hospital-Acquired Illness or Injury  Intervention: Identify and Manage Fall Risk  Recent Flowsheet Documentation  Taken 7/2/2024 0045 by Nancy Salazar RN  Safety Promotion/Fall Prevention: activity supervised  Taken 7/1/2024 2130 by Nancy Salazar RN  Safety Promotion/Fall Prevention: activity supervised  Intervention: Prevent Skin Injury  Recent Flowsheet Documentation  Taken 7/2/2024 0045 by Nancy Salazar RN  Body Position: position changed independently  Taken 7/1/2024 2130 by Nancy Salazar RN  Body Position: position changed independently   Goal Outcome Evaluation:  Patient A &Ox4. VSS and afebrile. She denies pain, only took scheduled tylenol. CMS improving per patient. Independent in he room. Aspen colar in place. Neck dressing CDI. Patient sleep between cares.

## 2024-07-02 NOTE — PLAN OF CARE
Problem: Adult Inpatient Plan of Care  Goal: Plan of Care Review  Description: The Plan of Care Review/Shift note should be completed every shift.  The Outcome Evaluation is a brief statement about your assessment that the patient is improving, declining, or no change.  This information will be displayed automatically on your shift  note.  7/2/2024 1449 by Codey Carrasco, RN  Outcome: Adequate for Care Transition     Discharged to TCU at 1450. Son transported.     Codey Carrasco, RN

## 2024-07-02 NOTE — PROGRESS NOTES
Occupational Therapy Discharge Summary    Reason for therapy discharge:    Discharged to transitional care facility.    Progress towards therapy goal(s). See goals on Care Plan in [facility] electronic health record for goal details.  Goals partially met.  Barriers to achieving goals:   discharge from facility.    Therapy recommendation(s):    Continued therapy is recommended.  Rationale/Recommendations:  At TCU to progress ind and safety with spinal prec..

## 2024-07-02 NOTE — DISCHARGE SUMMARY
DISCHARGE SUMMARY   Patient ID:   Jesika Paez   [id number]   78 year old   1945      Admit date: 6/26/2024      Discharge date: 07/02/2024      Admission Diagnoses: Cervical radiculopathy [M54.12]  Cervical stenosis of spinal canal [M48.02]  Cervical spondylosis with myelopathy [M47.12]      Procedure:      1.  Right cervical 5-cervical 6 open door laminoplasty    2.  Foraminotomies at bilateral cervical 5-cervical 6  3.  Use of operating room microscope.      Post op Diagnosis:      Cervical radiculopathy   Cervical stenosis of spinal canal [M48.02]  Cervical spondylosis with myelopathy     Surgeon: Dr. Fernandez     Exam:   Pt in bed. She appears comfortable and in no apparent distress, moving all extremities.   CN II-XII intact, alert and appropriate with conversation and following commands.   Bilateral upper and lower extremities with appropriate strength. DTR's WNL. Spine is non tender to palpation throughout. Lynn's and Babinski sign neg. Sensation diminished in hands and feet. Collar intact.   Calves soft and non-tender bilaterally.      Cervical dressing changed. Incision intact. Absent for edema, erythema or drainage.   She is ambulating with a steady gait. Tolerating regular diet without nausea or vomiting. Pain managed with oral medication. Voiding without difficulty. She is on room air with O2 sats greater than 90%.      Disposition: TCU     Plan:   -follow-up with NSGY in 2 weeks for incision check  -continue with hard collar at all times, please limit lifting to no more that ten pounds and avoid excessive bending, twisting and turning, excessive jostling and jarring activities.    -monitor for any increasing symptoms including neck pain, arm pain, arm weakness, bowel or bladder changes, fevers/chills and contact neurosurgery  -OK to leave incision open to air. Monitor for any increase in drainage, redness, pain or warmth and contact our office. You may shower but do not soak or scrub  your incision.     Jesika aPez verbalizes understanding and is in agreement with discharge.      Done while pt still in hospital bed       Review of your medicines          START taking         Dose / Directions   acetaminophen 325 MG tablet  Commonly known as: TYLENOL       Dose: 975 mg  Take 3 tablets (975 mg) by mouth every 8 hours as needed for pain  Refills: 0      oxyCODONE 5 MG tablet  Commonly known as: ROXICODONE       Dose: 5-10 mg  Take 1-2 tablets (5-10 mg) by mouth every 4 hours as needed for severe pain  Quantity: 40 tablet  Refills: 0      senna-docusate 8.6-50 MG tablet  Commonly known as: SENOKOT-S/PERICOLACE       Dose: 1 tablet  Take 1 tablet by mouth daily as needed for constipation (continue while taking oxycodone)  Quantity: 30 tablet  Refills: 0                CONTINUE these medicines which have NOT CHANGED         Dose / Directions   atorvastatin 20 MG tablet  Commonly known as: LIPITOR  Used for: Hyperlipidemia       Dose: 20 mg  Take 1 tablet (20 mg) by mouth daily  Quantity: 90 tablet  Refills: 3      gabapentin 300 MG capsule  Commonly known as: NEURONTIN  Used for: Cervical radiculopathy, Cervicalgia       Take 3 capsules by mouth 3 times a day.  Quantity: 270 capsule  Refills: 1                    Where to get your medicines          Some of these will need a paper prescription and others can be bought over the counter. Ask your nurse if you have questions.    Bring a paper prescription for each of these medications  oxyCODONE 5 MG tablet  senna-docusate 8.6-50 MG tablet  You don't need a prescription for these medications  acetaminophen 325 MG tablet                Discharge Procedure Orders   General info for SNF   Order Comments: Length of Stay Estimate: Short Term Care: Estimated # of Days <30  Condition at Discharge: Improving  Level of care:skilled   Rehabilitation Potential: Good  Admission H&P remains valid and up-to-date: Yes  Recent Chemotherapy: N/A  Use Nursing Home  Standing Orders: Yes          Mantoux instructions   Order Comments: Give two-step Mantoux (PPD) Per Facility Policy Yes          Follow Up and recommended labs and tests   Order Comments: Your Neurosurgical follow-up appointments have been scheduled. You may call [phone] to make, confirm or change your appointment dates and/or times.          Reason for your hospital stay   Order Comments: Right open-door laminoplasty at C5-C6 with bilateral foraminotomies with Dr. Fernandez on 6/26/24.          Wound care (specify)   Order Comments: Site:   Posterior cervical spine.   Instructions:  Please keep clean and dry. May shower but no baths. Please avoid lotions and creams. Please leave open to air.      Activity - Up ad danish      Order Specific Question Answer Comments   Is discharge order? Yes            Wound care   Order Comments: Collar to be worn at all times, may remove 2x daily for skin checks.      Full Code      Order Specific Question Answer Comments   Code status determined by: Unable to discuss and no AD/POLST on file; continue PREVIOUSLY ORDERED code status            Physical Therapy Adult Consult   Order Comments: Evaluate and treat as clinically indicated.     Reason:  s/p cervical laminoplasty. Must wear collar at all times.          Occupational Therapy Adult Consult   Order Comments: Evaluate and treat as clinically indicated.     Reason:  s/p cervical laminoplasty. Must wear collar at all times.      Fall precautions          Diet   Order Comments: Follow this diet upon discharge: Advance to a regular diet as tolerated.      Order Specific Question Answer Comments   Is discharge order? Yes              Respectfully,   Paulette Lua PA-C  [facility]  [address] [place] [address]  [phone]

## 2024-07-02 NOTE — PROGRESS NOTES
"[facility]    Medicine Progress Note - Hospitalist Service    Date of Admission:  6/26/2024    Assessment & Plan   78-year-old female with history of intractable neck pain, cervical radiculopathy and cervical stenosis, macular degeneration, osteoporosis, hyperlipidemia presents for laminoplasty at C5/C6 with bilateral foraminotomies.  [facility] consulted for medical management.     EUGENE drain was removed on 6/20/2024.  Stable for discharge to TCU on 7/2/24.    History of cervical radiculopathy, cervical stenosis, cervical spondylosis and myelopathy  -Status post laminoplasty at C5/6 with bilateral foraminotomies 6/26/2024  -Postop cares per neurosurgery  -Continue methocarbamol  -Analgesics as needed; started on ketorolac and decadron on 6/30/24  -PT/OT  -Keep neck collar in place    HTN  -patient intermittently HTN above goal  -will continue low dose lisinopril 2.5mg daily (started 7/1/24)   -monitor AM creatinine and potassium  -outpatient follow-up for dose adjustment      Peripheral neuropathy  Bilateral lower extremity numbness  -Continue PTA gabapentin 900 mg 3 times daily    Hyperlipidemia  -Continue atorvastatin 20 mg daily    GI prophylaxis   -Added pantoprazole to prevent stress ulcer    Diet: Advance Diet as Tolerated: Regular Diet Adult  Diet    DVT Prophylaxis: Enoxaparin (Lovenox) SQ  Szymanski Catheter: Not present  Lines: None     Cardiac Monitoring: None  Code Status: Full Code      Clinically Significant Risk Factors                               # Overweight: Estimated body mass index is 27.85 kg/m  as calculated from the following:    Height as of 6/20/24: 1.6 m (5' 3\").    Weight as of this encounter: 71.3 kg (157 lb 3.2 oz).        # Financial/Environmental Concerns: none         Disposition Plan     Medically Ready for Discharge: Today             Feng Gallego DO  Hospitalist Service  [facility]  Securely message with [employer] (more info)  Text page via " [employer] Paging/Directory   ______________________________________________________________________    Interval History   NAOE. No complaints. Discharging today. Discussed blood pressure management.    Physical Exam   Vital Signs: Temp: 98.3  F (36.8  C) Temp src: Oral BP: 128/58 Pulse: 67   Resp: 20 SpO2: 97 % O2 Device: None (Room air)    Weight: 157 lbs 3.2 oz      General appearance: Elderly woman sitting comfortably on the recliner, with neck collar in place, awake, Alert, Cooperative, not in any obvious distress and appears stated age   HEENT: Normocephalic, atraumatic, conjunctiva clear without icterus and ears without discharge neck  Lungs: breathing comfortably on room air   Cardiovascular: Regular Rate and Rythm, normal apical impulse, normal S1 and S2, no lower extremity edema bilaterally  Abdomen: Soft, non-tender and Non-distended, active bowel sounds  Skin: Skin color, texture normal and bruising or bleeding. No rashes or lesions over face, neck, arms and legs, turgor normal.  Musculoskeletal: No bony deformities or joint tenderness. Normal ROM upon flexion & extension.   Neurologic: Alert & Oriented X 3, Facial symmetry preserved and upper & lower extremities moving well with symmetry  Psychiatric: Calm, normal eye contact and normal affect      Medical Decision Making       45 MINUTES SPENT BY ME on the date of service doing chart review, history, exam, documentation & further activities per the note.      Data   Imaging results reviewed over the past 24 hrs:   No results found for this or any previous visit (from the past 24 hour(s)).

## 2024-07-02 NOTE — PROGRESS NOTES
Care Management Discharge Note    Discharge Date: 07/02/2024       Discharge Disposition: Transitional Care    Discharge Services: None    Discharge DME: None    Discharge Transportation: family or friend will provide    Private pay costs discussed: Not applicable    Does the patient's insurance plan have a 3 day qualifying hospital stay waiver?  No    PAS Confirmation Code: [id number]  Patient/family educated on Medicare website which has current facility and service quality ratings:      Education Provided on the Discharge Plan: Yes  Persons Notified of Discharge Plans: Patient/ Family/ Care Team/ Facility  Patient/Family in Agreement with the Plan: yes    Handoff Referral Completed: Yes    Additional Information:  Patient to discharge to [facility], via family transport at 330PM today. Orders sent via EPIC fax.     Kaur Pollard RN

## 2024-07-02 NOTE — PLAN OF CARE
Problem: Adult Inpatient Plan of Care  Goal: Plan of Care Review  Description: The Plan of Care Review/Shift note should be completed every shift.  The Outcome Evaluation is a brief statement about your assessment that the patient is improving, declining, or no change.  This information will be displayed automatically on your shift  note.  Outcome: Progressing     Problem: Adult Inpatient Plan of Care  Goal: Optimal Comfort and Wellbeing  Intervention: Monitor Pain and Promote Comfort  Recent Flowsheet Documentation  Taken 7/2/2024 0916 by Codey Carrasco RN  Pain Management Interventions: medication (see MAR)     Problem: Spinal Surgery  Goal: Optimal Functional Ability  Outcome: Progressing  Intervention: Optimize Functional Status  Recent Flowsheet Documentation  Taken 7/2/2024 0916 by Codey Carrasco RN  Activity Management: activity adjusted per tolerance  Positioning/Transfer Devices:   pillows   in use     Problem: Spinal Surgery  Goal: Optimal Neurologic Function  Intervention: Optimize Neurologic Function  Recent Flowsheet Documentation  Taken 7/2/2024 1203 by Codey Carrasco RN  Body Position: position changed independently  Taken 7/2/2024 0916 by Codey Carrasco RN  Body Position: position changed independently     Patient is alert and orientated, able to make her needs known. Reports minimal pain throughout shift. C-collar on. Neurologically intact. Declined pericolace and miralax, stated she had a bowel movement earlier today. Dressing changed to incision. Discharge planned for later today to TCU, son to transport.     Codey Carrasco RN

## 2024-07-03 ENCOUNTER — DOCUMENTATION ONLY (OUTPATIENT)
Dept: OTHER | Facility: CLINIC | Age: 79
End: 2024-07-03
Payer: COMMERCIAL

## 2024-07-03 PROCEDURE — 86481 TB AG RESPONSE T-CELL SUSP: CPT | Performed by: FAMILY MEDICINE

## 2024-07-03 PROCEDURE — P9604 ONE-WAY ALLOW PRORATED TRIP: HCPCS | Performed by: FAMILY MEDICINE

## 2024-07-03 PROCEDURE — 36415 COLL VENOUS BLD VENIPUNCTURE: CPT | Performed by: FAMILY MEDICINE

## 2024-07-04 LAB
QUANTIFERON MITOGEN: 10 IU/ML
QUANTIFERON NIL TUBE: 0.04 IU/ML
QUANTIFERON TB1 TUBE: 0.05 IU/ML
QUANTIFERON TB2 TUBE: 0.05

## 2024-07-05 LAB
GAMMA INTERFERON BACKGROUND BLD IA-ACNC: 0.04 IU/ML
M TB IFN-G BLD-IMP: NEGATIVE
M TB IFN-G CD4+ BCKGRND COR BLD-ACNC: 9.96 IU/ML
MITOGEN IGNF BCKGRD COR BLD-ACNC: 0.01 IU/ML
MITOGEN IGNF BCKGRD COR BLD-ACNC: 0.01 IU/ML

## 2024-07-08 ENCOUNTER — DOCUMENTATION ONLY (OUTPATIENT)
Dept: GERIATRICS | Facility: CLINIC | Age: 79
End: 2024-07-08

## 2024-07-08 ENCOUNTER — TRANSITIONAL CARE UNIT VISIT (OUTPATIENT)
Dept: GERIATRICS | Facility: CLINIC | Age: 79
End: 2024-07-08
Payer: COMMERCIAL

## 2024-07-08 ENCOUNTER — MEDICAL CORRESPONDENCE (OUTPATIENT)
Dept: HEALTH INFORMATION MANAGEMENT | Facility: CLINIC | Age: 79
End: 2024-07-08

## 2024-07-08 VITALS
BODY MASS INDEX: 27.64 KG/M2 | HEART RATE: 56 BPM | OXYGEN SATURATION: 94 % | HEIGHT: 63 IN | TEMPERATURE: 98.1 F | RESPIRATION RATE: 17 BRPM | SYSTOLIC BLOOD PRESSURE: 128 MMHG | DIASTOLIC BLOOD PRESSURE: 65 MMHG | WEIGHT: 156 LBS

## 2024-07-08 DIAGNOSIS — M54.2 NECK PAIN WITH HISTORY OF CERVICAL SPINAL SURGERY: ICD-10-CM

## 2024-07-08 DIAGNOSIS — E78.5 HYPERLIPIDEMIA, UNSPECIFIED HYPERLIPIDEMIA TYPE: ICD-10-CM

## 2024-07-08 DIAGNOSIS — M54.12 CERVICAL RADICULOPATHY: Primary | ICD-10-CM

## 2024-07-08 DIAGNOSIS — Z98.890 NECK PAIN WITH HISTORY OF CERVICAL SPINAL SURGERY: ICD-10-CM

## 2024-07-08 DIAGNOSIS — I10 ESSENTIAL HYPERTENSION: ICD-10-CM

## 2024-07-08 PROCEDURE — 99305 1ST NF CARE MODERATE MDM 35: CPT | Performed by: FAMILY MEDICINE

## 2024-07-08 NOTE — LETTER
7/8/2024      Jesika Paez  [address]  [place] [address]        [facility]  [facility] Record Number:  [id number]  [facility] [[id number]]  CODE STATUS:   DNR only    Chief Complaint/Reason for Visit:  Chief Complaint   Patient presents with     Hospital F/U       HPI:   Jesika Paez is a 78 year old female with hx of HTN, HLD, cervical radiculopathy, admitted to the hospital on 6/26/2024 for neck surgery as noted.    DISCHARGE SUMMARY   Admit date: 6/26/2024   Discharge date: 07/02/2024      Admission Diagnoses: Cervical radiculopathy [M54.12]  Cervical stenosis of spinal canal [M48.02]  Cervical spondylosis with myelopathy [M47.12]      Procedure:   1.  Right cervical 5-cervical 6 open door laminoplasty    2.  Foraminotomies at bilateral cervical 5-cervical 6  3.  Use of operating room microscope.      Post op Diagnosis:   Cervical radiculopathy   Cervical stenosis of spinal canal [M48.02]  Cervical spondylosis with myelopathy     Surgeon: Dr. Fernandez     She is ambulating with a steady gait. Tolerating regular diet without nausea or vomiting. Pain managed with oral medication. Voiding without difficulty. She is on room air with O2 sats greater than 90%.      Plan:   -follow-up with NSGY in 2 weeks for incision check  -continue with hard collar at all times, please limit lifting to no more that ten pounds and avoid excessive bending, twisting and turning, excessive jostling and jarring activities.    -monitor for any increasing symptoms including neck pain, arm pain, arm weakness, bowel or bladder changes, fevers/chills and contact neurosurgery  -OK to leave incision open to air. Monitor for any increase in drainage, redness, pain or warmth and contact our office. You may shower but do not soak or scrub your incision.    Overall stabilized and discharged to TCU on 7/2/2024 for PT, OT, nursing cares, medical  management and monitoring.       Today:  She has some numbness in her thumbs and great toes which has not changed. Pain in right side of neck. Wearing hard collar. Has appt for follow up with neurosurgery tomorrow and planning to go home from [facility] later in the week. She lives alone in an apartment. Appetite is good. She denies fever, cough, congestion. No SOB at rest, chest pain, dizziness. No new bowel or bladder concerns. No new vision or hearing concerns.       REVIEW OF SYSTEMS:  All others negative other than those noted in HPI.      PAST MEDICAL HISTORY:  Past Medical History:   Diagnosis Date     Cervical radiculopathy      HLD (hyperlipidemia)      Macular degeneration (senile) of retina      Osteoporosis        PAST SURGICAL HISTORY:  Past Surgical History:   Procedure Laterality Date     ANKLE FRACTURE SURGERY       CHOLECYSTECTOMY       FRACTURE SURGERY       HC OPEN TX BIMALLEOLAR ANKLE FX, INCL INTERNAL FIXATION      Description: Open Treatment Of Bimalleolar Ankle Fracture;  Recorded: 01/12/2010;     HC REMOVAL GALLBLADDER      Description: Cholecystectomy;  Recorded: 01/12/2010;     HC REMOVE TONSILS/ADENOIDS,<13 Y/O      Description: Tonsillectomy With Adenoidectomy;  Recorded: 01/12/2010;     HYSTERECTOMY       LAMINOPLASTY CERVICAL POSTERIOR ONE LEVEL Bilateral 6/26/2024    Procedure: RIGHT OPEN DOOR LAMINOPLASTY AT CERVICAL 5 - CERVICAL 6 WITH BILATERAL FORAMINOTOMIES;  Surgeon: Karishma Fernandez MD;  Location: [facility] OR     [facility] W/O FACETEC FORAMOT/DSKC 1/2 VRT SEG, LUMBAR      Description: Laminectomy Decompressive Up To Two Lumbar Segments;  Recorded: 01/12/2010;  Comments: X2 SEPARATE BACK SURGERIES     [facility] LIGATE FALLOPIAN TUBE      Description: Tubal Ligation;  Recorded: 01/12/2010;     [facility] TOTAL ABDOM HYSTERECTOMY      Description: Total Abdominal Hysterectomy;  Recorded: 01/12/2010;     [facility] TOTAL ABDOM HYSTERECTOMY      Description: Hysterectomy;  Recorded: 01/12/2010;        FAMILY HISTORY:  Family History   Problem Relation Age of Onset     Dementia Mother      Hearing Loss Mother      Heart Disease Father      Hyperlipidemia Father      Hypertension Father      Seizure Disorder Sister      Pneumonia Brother      Arthritis Brother      Chronic Infections Brother      Suicidality Son        SOCIAL HISTORY:  Social History     Socioeconomic History     Marital status:      Spouse name: Not on file     Number of children: Not on file     Years of education: Not on file     Highest education level: Not on file   Occupational History     Not on file   Tobacco Use     Smoking status: Former     Current packs/day: 0.00     Average packs/day: 1 pack/day for 30.1 years (30.1 ttl pk-yrs)     Types: Cigarettes     Start date: 1982     Quit date: 2012     Years since quittin.4     Smokeless tobacco: Never   Substance and Sexual Activity     Alcohol use: No     Drug use: No     Sexual activity: Not on file   Other Topics Concern     Not on file   Social History Narrative    Patient has been  4 times.  She lives with her son.  Her younger son committed suicide at the age of 24.     Social Determinants of Health     Financial Resource Strain: Low Risk  (2023)    Financial Resource Strain      Within the past 12 months, have you or your family members you live with been unable to get utilities (heat, electricity) when it was really needed?: No   Food Insecurity: Low Risk  (2023)    Food Insecurity      Within the past 12 months, did you worry that your food would run out before you got money to buy more?: No      Within the past 12 months, did the food you bought just not last and you didn t have money to get more?: No   Transportation Needs: High Risk (2023)    Transportation Needs      Within the past 12 months, has lack of transportation kept you from medical appointments, getting your medicines, non-medical meetings or appointments, work, or from  "getting things that you need?: Yes   Physical Activity: Not on file   Stress: Not on file   Social Connections: Not on file   Interpersonal Safety: Low Risk  (9/22/2023)    Interpersonal Safety      Do you feel physically and emotionally safe where you currently live?: Yes      Within the past 12 months, have you been hit, slapped, kicked or otherwise physically hurt by someone?: No      Within the past 12 months, have you been humiliated or emotionally abused in other ways by your partner or ex-partner?: No   Housing Stability: Low Risk  (9/21/2023)    Housing Stability      Do you have housing? : Yes      Are you worried about losing your housing?: No       MEDICATIONS:  Current Outpatient Medications   Medication Sig Dispense Refill     acetaminophen (TYLENOL) 325 MG tablet Take 3 tablets (975 mg) by mouth every 8 hours as needed for pain       atorvastatin (LIPITOR) 20 MG tablet Take 1 tablet (20 mg) by mouth daily 90 tablet 3     gabapentin (NEURONTIN) 300 MG capsule Take 3 capsules by mouth 3 times a day. 270 capsule 1     lisinopril (ZESTRIL) 2.5 MG tablet Take 1 tablet (2.5 mg) by mouth daily       oxyCODONE (ROXICODONE) 5 MG tablet Take 1-2 tablets (5-10 mg) by mouth every 4 hours as needed for severe pain 40 tablet 0        ALLERGIES:  Allergies   Allergen Reactions     Codeine Sulfate [Codeine] Unknown     Methocarbamol Hallucination       PHYSICAL EXAM:  General: Patient is alert female, no distress.   Vitals: /65   Pulse 56   Temp 98.1  F (36.7  C)   Resp 17   Ht 1.6 m (5' 3\")   Wt 70.8 kg (156 lb)   SpO2 94%   BMI 27.63 kg/m    HEENT: Head is NCAT. Eyes show no injection or icterus. Nares negative. Oropharynx well hydrated.  Neck: Hard C collar.  Lungs: Clear bilaterally. No wheezes.  Cardiovascular: Regular rate and rhythm, normal S1, S2.  Back: No spinal or CVA tenderness.  Abdomen: Soft, no tenderness on exam. Bowel sounds present. No guarding rebound or rigidity.  : " Deferred.  Extremities: No edema is noted.  Musculoskeletal: Degen changes.   Skin: No rashes.  Psych: Mood appears good.      LABS/DIAGNOSTIC DATA:  Component      Latest Ref Rng 6/27/2024  5:58 AM   Sodium      135 - 145 mmol/L 139    Anion Gap      7 - 15 mmol/L 9    Creatinine      0.51 - 0.95 mg/dL 0.70    Calcium      8.8 - 10.2 mg/dL 8.5 (L)    GFR Estimate      >60 mL/min/1.73m2 88    Potassium      3.4 - 5.3 mmol/L 4.7    Chloride      98 - 107 mmol/L 105    Carbon Dioxide (CO2)      22 - 29 mmol/L 25    Urea Nitrogen      8.0 - 23.0 mg/dL 13.0    Glucose      70 - 99 mg/dL 131 (H)           ASSESSMENT/PLAN:  Cervical spinal stenosis and radiculopathy. S/p surgery on 6/26/2024: Right cervical 5-cervical 6 open door laminoplasty. Hard collar, follow up with neurosurgery tomorrow.   Radiculopathy. Persistent sx, she will discuss with neurosurgeon tomorrow. Continue gabapentin.  HTN. On lisinopril. Monitor Bps in TCU, adjust meds if necessary.   HLD. She is on atorvastatin, continue.   Code status is DNR.      Electronically signed by: Christen Scott MD      Sincerely,        Christen Scott MD

## 2024-07-09 ENCOUNTER — ALLIED HEALTH/NURSE VISIT (OUTPATIENT)
Dept: NEUROSURGERY | Facility: CLINIC | Age: 79
End: 2024-07-09
Payer: COMMERCIAL

## 2024-07-09 VITALS
DIASTOLIC BLOOD PRESSURE: 61 MMHG | SYSTOLIC BLOOD PRESSURE: 131 MMHG | HEART RATE: 68 BPM | TEMPERATURE: 98.6 F | OXYGEN SATURATION: 94 %

## 2024-07-09 DIAGNOSIS — M54.12 CERVICAL RADICULOPATHY: Primary | ICD-10-CM

## 2024-07-09 PROCEDURE — 99207 PR NO CHARGE NURSE ONLY: CPT

## 2024-07-09 NOTE — PROGRESS NOTES
POSTOPERATIVE FOLLOW-UP NURSE VISIT NOTE    Procedure: RIGHT OPEN DOOR LAMINOPLASTY AT CERVICAL 5 - CERVICAL 6 WITH BILATERAL FORAMINOTOMIES     Date: 6/26/24    Surgeon: Jim    Pre-op symptoms: neck and arm pain     Post-op symptoms: pain is about the same as prior to surgery    Pain and medications: 7/10  Oxycodone 5-10 mg Q4 PRN: taking TID  Acetaminophen 975 mg Q8 PRN: unknown    Bracing compliance: compliant    Medications refilled: facility    Imaging ordered: XR cervical    Wound: CDI, approximated, no drainage, mild erythema, minimal temperature change compared to surrounding tissue, mildly tender.  Cleansed with iodine, sutures removed, cleansed again with iodine.    Jesika FALCON Philippe presents to the clinic today for removal of suture.  The patient has had the suture in place for 13 days.  There has been no history of infection or drainage.  1 continuous running suture are seen located on the surgical site midline posterior neck.  The wound is healing well with no signs of infection.  Tetanus last injection 11/5/20.   All suture were easily removed today.  Routine wound care discussed.  The patient will follow up as needed.

## 2024-07-09 NOTE — PATIENT INSTRUCTIONS
Follow-up 8/6/24 at 2:15 PM with x-ray prior. Schedule x-ray at  on your way out.    A dressing is not required.    Keep the wound clean.    Wash your hands before touching the wound.  Ensure that anyone assisting you in the care of your wound washes her/his hands before touching the wound. Good handwashing can decrease the risk of serious infection.    If you are unable to see your wound, have someone check the wound daily for redness, swelling,or drainage. A small amount of drainage is normal.    You may shower.  Pat the wound dry. Do not rub.    No tub baths until the wound is well healed.  Usually 5-6 weeks.     If you develop redness, swelling, drainage, or temp 101 or greater, call our clinic.    * No lifting, pushing or pulling greater than 5-10 pound (this is about a gallon of milk) for the first 6 weeks after surgery .  *No repetitive bending, twisting, or jarring activities for 6 weeks.  *No overhead work  *No aerobic or strenuous activity  *No activities with increased risk of falls  *You may move about your home as tolerated  *You may walk up and down stairs as tolerated  *You may increase your activity slowly over the next 6 weeks    WALKING PROGRAM: As you can tolerate, walk daily-start with 5-10 minutes of continuous walking. This is in addition to the walking that you do as part of your daily activities. Increase the time that you walk by 5 minutes every couple of days. Do not exceed 30-45 minutes of continuous walking until seen in follow-up. Walking is the best exercise after surgery.  **Listen to your body, if you find that you are more painful or fatigued, you may need to proceed more slowly.    **Do not smoke or expose yourself to second hand smoke. Cigarette smoke can delay healing and cause complications.     DRIVING:  We recommend that you do not drive while taking medications for pain or muscle spasms. Always read and follow the advice on your prescription bottle. If you have  questions, speak with your pharmacist.  We recommend that you do not drive while wearing a brace, as it could limit your range of motion.    WORK: If you plan to return to work before your 6 week post-op appointment, call and discuss with one of the nurses in the neurosurgery office.     WEARING THE COLLAR:   Kevin TOPETE: (collar with blue pads) wear when out of bed   * You should have two sets of blue pads with your collar.  Wash and exchange   the pads at least daily. Wash the pads in mild soapand lay flat to dry.   *The collar should fit snuggly.   *Clean and check the skin under your collar at least daily.   *Cleaning the skin under the collar with a mild astringent (witch hazel) will      decreases the likelihood of skin irritation.  DO NOT apply to incision.  Always     check for allergy before using.  Jaky: (pink foam collar): Use for showers.    *You should sit to shower to limitthe risk of falling.    Soft collar: If you were given soft white collar use only when sleeping, eating, showering

## 2024-07-11 ENCOUNTER — MEDICAL CORRESPONDENCE (OUTPATIENT)
Dept: HEALTH INFORMATION MANAGEMENT | Facility: CLINIC | Age: 79
End: 2024-07-11
Payer: COMMERCIAL

## 2024-07-22 NOTE — PROGRESS NOTES
[facility] GERIATRICS  [facility] Medical Record Number:  [id number]  Place of Service where encounter took place: [facility] [[id number]]  CODE STATUS:   DNR only    Chief Complaint/Reason for Visit:  Chief Complaint   Patient presents with    Hospital F/U       HPI:   Jesika Paez is a 78 year old female with hx of HTN, HLD, cervical radiculopathy, admitted to the hospital on 6/26/2024 for neck surgery as noted.    DISCHARGE SUMMARY   Admit date: 6/26/2024   Discharge date: 07/02/2024      Admission Diagnoses: Cervical radiculopathy [M54.12]  Cervical stenosis of spinal canal [M48.02]  Cervical spondylosis with myelopathy [M47.12]      Procedure:   1.  Right cervical 5-cervical 6 open door laminoplasty    2.  Foraminotomies at bilateral cervical 5-cervical 6  3.  Use of operating room microscope.      Post op Diagnosis:   Cervical radiculopathy   Cervical stenosis of spinal canal [M48.02]  Cervical spondylosis with myelopathy     Surgeon: Dr. Fernandez     She is ambulating with a steady gait. Tolerating regular diet without nausea or vomiting. Pain managed with oral medication. Voiding without difficulty. She is on room air with O2 sats greater than 90%.      Plan:   -follow-up with NSGY in 2 weeks for incision check  -continue with hard collar at all times, please limit lifting to no more that ten pounds and avoid excessive bending, twisting and turning, excessive jostling and jarring activities.    -monitor for any increasing symptoms including neck pain, arm pain, arm weakness, bowel or bladder changes, fevers/chills and contact neurosurgery  -OK to leave incision open to air. Monitor for any increase in drainage, redness, pain or warmth and contact our office. You may shower but do not soak or scrub your incision.    Overall stabilized and discharged to TCU on 7/2/2024 for PT, OT, nursing cares, medical management and monitoring.       Today:  She has some numbness in her thumbs and great toes  which has not changed. Pain in right side of neck. Wearing hard collar. Has appt for follow up with neurosurgery tomorrow and planning to go home from [facility] later in the week. She lives alone in an apartment. Appetite is good. She denies fever, cough, congestion. No SOB at rest, chest pain, dizziness. No new bowel or bladder concerns. No new vision or hearing concerns.       REVIEW OF SYSTEMS:  All others negative other than those noted in HPI.      PAST MEDICAL HISTORY:  Past Medical History:   Diagnosis Date    Cervical radiculopathy     HLD (hyperlipidemia)     Macular degeneration (senile) of retina     Osteoporosis        PAST SURGICAL HISTORY:  Past Surgical History:   Procedure Laterality Date    ANKLE FRACTURE SURGERY      CHOLECYSTECTOMY      FRACTURE SURGERY      HC OPEN TX BIMALLEOLAR ANKLE FX, INCL INTERNAL FIXATION      Description: Open Treatment Of Bimalleolar Ankle Fracture;  Recorded: 01/12/2010;    HC REMOVAL GALLBLADDER      Description: Cholecystectomy;  Recorded: 01/12/2010;    HC REMOVE TONSILS/ADENOIDS,<13 Y/O      Description: Tonsillectomy With Adenoidectomy;  Recorded: 01/12/2010;    HYSTERECTOMY      LAMINOPLASTY CERVICAL POSTERIOR ONE LEVEL Bilateral 6/26/2024    Procedure: RIGHT OPEN DOOR LAMINOPLASTY AT CERVICAL 5 - CERVICAL 6 WITH BILATERAL FORAMINOTOMIES;  Surgeon: Karishma Fernandez MD;  Location: [facility] OR    [facility] W/O FACETEC FORAMOT/DSKC 1/2 VRT SEG, LUMBAR      Description: Laminectomy Decompressive Up To Two Lumbar Segments;  Recorded: 01/12/2010;  Comments: X2 SEPARATE BACK SURGERIES    [facility] LIGATE FALLOPIAN TUBE      Description: Tubal Ligation;  Recorded: 01/12/2010;    [facility] TOTAL ABDOM HYSTERECTOMY      Description: Total Abdominal Hysterectomy;  Recorded: 01/12/2010;    [facility] TOTAL ABDOM HYSTERECTOMY      Description: Hysterectomy;  Recorded: 01/12/2010;       FAMILY HISTORY:  Family History   Problem Relation Age of Onset    Dementia Mother     Hearing Loss Mother      Heart Disease Father     Hyperlipidemia Father     Hypertension Father     Seizure Disorder Sister     Pneumonia Brother     Arthritis Brother     Chronic Infections Brother     Suicidality Son        SOCIAL HISTORY:  Social History     Socioeconomic History    Marital status:      Spouse name: Not on file    Number of children: Not on file    Years of education: Not on file    Highest education level: Not on file   Occupational History    Not on file   Tobacco Use    Smoking status: Former     Current packs/day: 0.00     Average packs/day: 1 pack/day for 30.1 years (30.1 ttl pk-yrs)     Types: Cigarettes     Start date: 1982     Quit date: 2012     Years since quittin.4    Smokeless tobacco: Never   Substance and Sexual Activity    Alcohol use: No    Drug use: No    Sexual activity: Not on file   Other Topics Concern    Not on file   Social History Narrative    Patient has been  4 times.  She lives with her son.  Her younger son committed suicide at the age of 24.     Social Determinants of Health     Financial Resource Strain: Low Risk  (2023)    Financial Resource Strain     Within the past 12 months, have you or your family members you live with been unable to get utilities (heat, electricity) when it was really needed?: No   Food Insecurity: Low Risk  (2023)    Food Insecurity     Within the past 12 months, did you worry that your food would run out before you got money to buy more?: No     Within the past 12 months, did the food you bought just not last and you didn t have money to get more?: No   Transportation Needs: High Risk (2023)    Transportation Needs     Within the past 12 months, has lack of transportation kept you from medical appointments, getting your medicines, non-medical meetings or appointments, work, or from getting things that you need?: Yes   Physical Activity: Not on file   Stress: Not on file   Social Connections: Not on file   Interpersonal  "Safety: Low Risk  (9/22/2023)    Interpersonal Safety     Do you feel physically and emotionally safe where you currently live?: Yes     Within the past 12 months, have you been hit, slapped, kicked or otherwise physically hurt by someone?: No     Within the past 12 months, have you been humiliated or emotionally abused in other ways by your partner or ex-partner?: No   Housing Stability: Low Risk  (9/21/2023)    Housing Stability     Do you have housing? : Yes     Are you worried about losing your housing?: No       MEDICATIONS:  Current Outpatient Medications   Medication Sig Dispense Refill    acetaminophen (TYLENOL) 325 MG tablet Take 3 tablets (975 mg) by mouth every 8 hours as needed for pain      atorvastatin (LIPITOR) 20 MG tablet Take 1 tablet (20 mg) by mouth daily 90 tablet 3    gabapentin (NEURONTIN) 300 MG capsule Take 3 capsules by mouth 3 times a day. 270 capsule 1    lisinopril (ZESTRIL) 2.5 MG tablet Take 1 tablet (2.5 mg) by mouth daily      oxyCODONE (ROXICODONE) 5 MG tablet Take 1-2 tablets (5-10 mg) by mouth every 4 hours as needed for severe pain 40 tablet 0        ALLERGIES:  Allergies   Allergen Reactions    Codeine Sulfate [Codeine] Unknown    Methocarbamol Hallucination       PHYSICAL EXAM:  General: Patient is alert female, no distress.   Vitals: /65   Pulse 56   Temp 98.1  F (36.7  C)   Resp 17   Ht 1.6 m (5' 3\")   Wt 70.8 kg (156 lb)   SpO2 94%   BMI 27.63 kg/m    HEENT: Head is NCAT. Eyes show no injection or icterus. Nares negative. Oropharynx well hydrated.  Neck: Hard C collar.  Lungs: Clear bilaterally. No wheezes.  Cardiovascular: Regular rate and rhythm, normal S1, S2.  Back: No spinal or CVA tenderness.  Abdomen: Soft, no tenderness on exam. Bowel sounds present. No guarding rebound or rigidity.  : Deferred.  Extremities: No edema is noted.  Musculoskeletal: Degen changes.   Skin: No rashes.  Psych: Mood appears good.      LABS/DIAGNOSTIC DATA:  Component      " Latest Ref Rng 6/27/2024  5:58 AM   Sodium      135 - 145 mmol/L 139    Anion Gap      7 - 15 mmol/L 9    Creatinine      0.51 - 0.95 mg/dL 0.70    Calcium      8.8 - 10.2 mg/dL 8.5 (L)    GFR Estimate      >60 mL/min/1.73m2 88    Potassium      3.4 - 5.3 mmol/L 4.7    Chloride      98 - 107 mmol/L 105    Carbon Dioxide (CO2)      22 - 29 mmol/L 25    Urea Nitrogen      8.0 - 23.0 mg/dL 13.0    Glucose      70 - 99 mg/dL 131 (H)           ASSESSMENT/PLAN:  Cervical spinal stenosis and radiculopathy. S/p surgery on 6/26/2024: Right cervical 5-cervical 6 open door laminoplasty. Hard collar, follow up with neurosurgery tomorrow.   Radiculopathy. Persistent sx, she will discuss with neurosurgeon tomorrow. Continue gabapentin.  HTN. On lisinopril. Monitor Bps in TCU, adjust meds if necessary.   HLD. She is on atorvastatin, continue.   Code status is DNR.      Electronically signed by: Christen Scott MD

## 2024-07-26 ENCOUNTER — OFFICE VISIT (OUTPATIENT)
Dept: FAMILY MEDICINE | Facility: CLINIC | Age: 79
End: 2024-07-26
Payer: COMMERCIAL

## 2024-07-26 VITALS
BODY MASS INDEX: 27.11 KG/M2 | DIASTOLIC BLOOD PRESSURE: 74 MMHG | RESPIRATION RATE: 18 BRPM | TEMPERATURE: 98 F | WEIGHT: 153 LBS | HEART RATE: 60 BPM | SYSTOLIC BLOOD PRESSURE: 124 MMHG | OXYGEN SATURATION: 95 % | HEIGHT: 63 IN

## 2024-07-26 DIAGNOSIS — E78.5 HYPERLIPIDEMIA, UNSPECIFIED HYPERLIPIDEMIA TYPE: ICD-10-CM

## 2024-07-26 DIAGNOSIS — M81.0 OSTEOPOROSIS, UNSPECIFIED OSTEOPOROSIS TYPE, UNSPECIFIED PATHOLOGICAL FRACTURE PRESENCE: ICD-10-CM

## 2024-07-26 DIAGNOSIS — M54.12 CERVICAL RADICULOPATHY: Primary | ICD-10-CM

## 2024-07-26 DIAGNOSIS — M48.02 SPINAL STENOSIS IN CERVICAL REGION: ICD-10-CM

## 2024-07-26 DIAGNOSIS — Z98.890 H/O CERVICAL SPINE SURGERY: ICD-10-CM

## 2024-07-26 PROCEDURE — G2211 COMPLEX E/M VISIT ADD ON: HCPCS | Performed by: FAMILY MEDICINE

## 2024-07-26 PROCEDURE — 99213 OFFICE O/P EST LOW 20 MIN: CPT | Performed by: FAMILY MEDICINE

## 2024-07-26 RX ORDER — PRENATAL 168/IRON/FOLIC/OMEGA3 27-800-235
CAPSULE ORAL
COMMUNITY

## 2024-07-26 RX ORDER — AMOXICILLIN 500 MG
1200 CAPSULE ORAL DAILY
COMMUNITY

## 2024-07-26 NOTE — PROGRESS NOTES
"Jesika Paez  /74   Pulse 60   Temp 98  F (36.7  C)   Resp 18   Ht 1.6 m (5' 3\")   Wt 69.4 kg (153 lb)   SpO2 95%   BMI 27.10 kg/m       Assessment/Plan:                Jesika was seen today for recheck medication and hospital f/u.    Diagnoses and all orders for this visit:    Cervical radiculopathy    Spinal stenosis in cervical region    H/O cervical spine surgery    Hyperlipidemia, unspecified hyperlipidemia type    Osteoporosis, unspecified osteoporosis type, unspecified pathological fracture presence         DISCUSSION  Discontinue lisinopril monitor blood pressure.    See additional discussion below.  Subjective:     HPI:    Jesika Paez is a 78 year old female is here today for follow-up after having surgical intervention for cervical spine stenosis in late June on June 26.    Her other medical history includes osteoporosis, hyperlipidemia.    Patient seen in the hospital for 6 days and was discharged to transitional care on July 2.  She had an uneventful stay in transitional care and has been home now for almost 2 weeks.    She has not required the use of oxycodone since returning home.  She remains on gabapentin is slowly working to wean down the dose 1 pill every few days.  She is currently taking three 300 mg capsules 3 times a day.    She reports that she is able to do all of her normal activities without significant difficulties.    She does report that she has persistent numbness in her thumbs and big toes this has been persistent since the time of surgery and is largely unchanged.  Muscle cramps that occur before surgery were gone for a time but she has had a few that have returned.  In an area on the scalp on the right parietal area she has intermittent discomfort that is most prominent in the morning.  She does note that there was a probe placed there during the surgery.  Patient reports that the pain symptoms are gradually improving.    She was placed on lisinopril 2.5 " mg when she was in the hospital.  Blood pressure today is in the 120 range.  Discussed stopping lisinopril and continuing to monitor blood pressure.    No indication for any repeat labs based on review of lab test prior to discharge from the hospital.    ROS:  Complete review of systems is obtained.  Other than the specific considerations noted above complete review of systems is negative.          Objective:   Medications:  Current Outpatient Medications   Medication Sig Dispense Refill    acetaminophen (TYLENOL) 325 MG tablet Take 3 tablets (975 mg) by mouth every 8 hours as needed for pain      atorvastatin (LIPITOR) 20 MG tablet Take 1 tablet (20 mg) by mouth daily 90 tablet 3    gabapentin (NEURONTIN) 300 MG capsule Take 3 capsules by mouth 3 times a day. 270 capsule 1    Multiple Vitamins-Minerals (ONE-A-DAY FOR HER VITACRAVES) CHEW       Omega-3 Fatty Acids (FISH OIL) 1200 MG capsule Take 1,200 mg by mouth daily      Specialty Vitamins Products (HAIR BOOSTER PO)        No current facility-administered medications for this visit.        Allergies:     Allergies   Allergen Reactions    Codeine Sulfate [Codeine] Unknown    Methocarbamol Hallucination        Social History     Socioeconomic History    Marital status:      Spouse name: Not on file    Number of children: Not on file    Years of education: Not on file    Highest education level: Not on file   Occupational History    Not on file   Tobacco Use    Smoking status: Former     Current packs/day: 0.00     Average packs/day: 1 pack/day for 30.1 years (30.1 ttl pk-yrs)     Types: Cigarettes     Start date: 1982     Quit date: 2012     Years since quittin.4    Smokeless tobacco: Never   Substance and Sexual Activity    Alcohol use: No    Drug use: No    Sexual activity: Not on file   Other Topics Concern    Not on file   Social History Narrative    Patient has been  4 times.  She lives with her son.  Her younger son committed  suicide at the age of 24.     Social Determinants of Health     Financial Resource Strain: Low Risk  (9/21/2023)    Financial Resource Strain     Within the past 12 months, have you or your family members you live with been unable to get utilities (heat, electricity) when it was really needed?: No   Food Insecurity: Low Risk  (9/21/2023)    Food Insecurity     Within the past 12 months, did you worry that your food would run out before you got money to buy more?: No     Within the past 12 months, did the food you bought just not last and you didn t have money to get more?: No   Transportation Needs: High Risk (9/21/2023)    Transportation Needs     Within the past 12 months, has lack of transportation kept you from medical appointments, getting your medicines, non-medical meetings or appointments, work, or from getting things that you need?: Yes   Physical Activity: Not on file   Stress: Not on file   Social Connections: Not on file   Interpersonal Safety: Low Risk  (9/22/2023)    Interpersonal Safety     Do you feel physically and emotionally safe where you currently live?: Yes     Within the past 12 months, have you been hit, slapped, kicked or otherwise physically hurt by someone?: No     Within the past 12 months, have you been humiliated or emotionally abused in other ways by your partner or ex-partner?: No   Housing Stability: Low Risk  (9/21/2023)    Housing Stability     Do you have housing? : Yes     Are you worried about losing your housing?: No       Family History   Problem Relation Age of Onset    Dementia Mother     Hearing Loss Mother     Heart Disease Father     Hyperlipidemia Father     Hypertension Father     Seizure Disorder Sister     Pneumonia Brother     Arthritis Brother     Chronic Infections Brother     Suicidality Son         Most Recent Immunizations   Administered Date(s) Administered    COVID-19 12+ (2023-24) (MODERNA) 10/10/2023    COVID-19 12+ (2023-24) (Pfizer) 05/20/2024    COVID-19  "Bivalent 18+ (Moderna) 10/20/2022    COVID-19 Monovalent 18+ (Moderna) 10/20/2022    Flu 65+ Years 09/16/2019    Flu, Unspecified 10/22/2020    Influenza (H1N1) 01/12/2010    Influenza (High Dose) 3 valent vaccine 09/14/2017    Influenza Vaccine 65+ (FLUAD) 09/09/2021    Influenza Vaccine 65+ (Fluzone HD) 09/22/2023    Influenza, seasonal, injectable, PF 10/07/2010    Pneumo Conj 13-V (2010&after) 09/14/2017    Pneumococcal 23 valent 10/22/2020    RSV Vaccine (Arexvy) 10/10/2023    TDAP Vaccine (Boostrix) 11/05/2020    Td (Adult), Adsorbed 10/07/2010    Td,adult,historic,unspecified 10/07/2010    Zoster recombinant adjuvanted (SHINGRIX) 07/17/2018    Zoster vaccine, live 04/15/2011        Wt Readings from Last 3 Encounters:   07/26/24 69.4 kg (153 lb)   07/08/24 70.8 kg (156 lb)   06/26/24 71.3 kg (157 lb 3.2 oz)        BP Readings from Last 6 Encounters:   07/26/24 124/74   07/09/24 131/61   07/08/24 128/65   07/02/24 128/58   06/20/24 136/78   05/20/24 (!) 146/78      PHYSICAL EXAM:    /74   Pulse 60   Temp 98  F (36.7  C)   Resp 18   Ht 1.6 m (5' 3\")   Wt 69.4 kg (153 lb)   SpO2 95%   BMI 27.10 kg/m       General: Patient alert no signs of distress    Examination of the surgical site reveals that it is well-healed currently with sutures removed.    Heart: Regular rate and rhythm no murmur    Lungs: Good air movement throughout no wheeze or crackle                          "

## 2024-08-05 ENCOUNTER — HOSPITAL ENCOUNTER (OUTPATIENT)
Dept: GENERAL RADIOLOGY | Facility: HOSPITAL | Age: 79
Discharge: HOME | End: 2024-08-05
Attending: SURGERY | Admitting: SURGERY
Payer: COMMERCIAL

## 2024-08-05 DIAGNOSIS — M54.12 CERVICAL RADICULOPATHY: ICD-10-CM

## 2024-08-05 PROCEDURE — 72040 X-RAY EXAM NECK SPINE 2-3 VW: CPT

## 2024-08-06 ENCOUNTER — OFFICE VISIT (OUTPATIENT)
Dept: NEUROSURGERY | Facility: CLINIC | Age: 79
End: 2024-08-06
Payer: COMMERCIAL

## 2024-08-06 VITALS — DIASTOLIC BLOOD PRESSURE: 78 MMHG | SYSTOLIC BLOOD PRESSURE: 160 MMHG

## 2024-08-06 DIAGNOSIS — M54.12 CERVICAL RADICULOPATHY: Primary | ICD-10-CM

## 2024-08-06 PROCEDURE — 99024 POSTOP FOLLOW-UP VISIT: CPT | Performed by: PHYSICIAN ASSISTANT

## 2024-08-06 ASSESSMENT — PAIN SCALES - GENERAL: PAINLEVEL: MILD PAIN (2)

## 2024-08-06 NOTE — PROGRESS NOTES
NEUROSURGERY FOLLOW UP  NOTE 8/6/2024     Ms. Paez is a 78 year old female who comes today in follow-up. postoperative Right cervical 5-cervical 6 open door laminoplasty with foraminotomies at bilateral cervical 5-cervical 6 on 6/26/2024 by Dr. Fernandez      Presently patient states that she is doing well. She has slight posterior cervical pain but denies any radicular upper extremity pain numbness tingling or weakness. She feels that her gait has been improving.        PHYSICAL EXAM:   BP (!) 160/78 (BP Location: Left arm, Patient Position: Sitting, Cuff Size: Adult Regular)      Mental Status: A & O in no acute distress.  Affect is appropriate.  Speech is fluent.  Recent and remote memory are intact.  Attention span and concentration are normal.     Motor:  Normal bulk and tone all muscle groups of lower extremities.     Sensory: Sensation intact bilaterally to light touch in LE     Reflexes; knee/ ankle jerk 1+     Incision: well healed without erythema, induration, or drainage     IMAGING:   I personally reviewed all radiographic images            good hardware placement with stable cervical alignment       IMPRESSION: Right C5-C6 laminoplasty changes without complication. Straightening of usual cervical lordosis without significant spondylolisthesis. No acute fracture. Multilevel degenerative change most prominent C5-C6 where there is moderate severe disc   height loss     CONSULTATION ASSESSMENT AND PLAN:    She has been advised to wean from her collar by removing the collar  for 1-2 hours a day, increasing each day by 1-2 hour increments.  If at any time during the wean you experience excessive fatigue, back pain or spasm, back down to the previous level for a day or so, then resume schedule.  Once out of brace for a full 8 hours, discontinue altogether or wear only as needed for comfort.  She can gradually get back to her normal activities without restrictions. Will plan to follow up as needed and she  understands that should any symptoms arise to contact the office.      Eve Renteria PA-C  [facility] Neurosurgery  O: [phone]

## 2024-08-06 NOTE — LETTER
8/6/2024      Jesika Paez  [address]  [place] [address]      Dear Colleague,    Thank you for referring your patient, Jesika Paez, to the [facility] SP[facility] NE[facility]ERY. Please see a copy of my visit note below.    NEUROSURGERY FOLLOW UP  NOTE 8/6/2024     Ms. Paez is a 78 year old female who comes today in follow-up. postoperative Right cervical 5-cervical 6 open door laminoplasty with foraminotomies at bilateral cervical 5-cervical 6 on 6/26/2024 by Dr. Fernandez      Presently patient states that she is doing well. She has slight posterior cervical pain but denies any radicular upper extremity pain numbness tingling or weakness. She feels that her gait has been improving.        PHYSICAL EXAM:   BP (!) 160/78 (BP Location: Left arm, Patient Position: Sitting, Cuff Size: Adult Regular)      Mental Status: A & O in no acute distress.  Affect is appropriate.  Speech is fluent.  Recent and remote memory are intact.  Attention span and concentration are normal.     Motor:  Normal bulk and tone all muscle groups of lower extremities.     Sensory: Sensation intact bilaterally to light touch in LE     Reflexes; knee/ ankle jerk 1+     Incision: well healed without erythema, induration, or drainage     IMAGING:   I personally reviewed all radiographic images            good hardware placement with stable cervical alignment       IMPRESSION: Right C5-C6 laminoplasty changes without complication. Straightening of usual cervical lordosis without significant spondylolisthesis. No acute fracture. Multilevel degenerative change most prominent C5-C6 where there is moderate severe disc   height loss     CONSULTATION ASSESSMENT AND PLAN:    She has been advised to wean from her collar by removing the collar  for 1-2 hours a day, increasing each day by 1-2 hour increments.  If at any time during the wean you experience excessive fatigue, back pain or spasm, back down to the previous  level for a day or so, then resume schedule.  Once out of brace for a full 8 hours, discontinue altogether or wear only as needed for comfort.  She can gradually get back to her normal activities without restrictions. Will plan to follow up as needed and she understands that should any symptoms arise to contact the office.      Eve Renteria PA-C  [facility] Neurosurgery  O: [phone]            Again, thank you for allowing me to participate in the care of your patient.        Sincerely,        Eve Renteria PA-C

## 2024-08-23 ENCOUNTER — PATIENT OUTREACH (OUTPATIENT)
Dept: CARE COORDINATION | Facility: CLINIC | Age: 79
End: 2024-08-23
Payer: COMMERCIAL

## 2024-09-06 ENCOUNTER — PATIENT OUTREACH (OUTPATIENT)
Dept: CARE COORDINATION | Facility: CLINIC | Age: 79
End: 2024-09-06
Payer: COMMERCIAL

## 2024-10-31 ENCOUNTER — MYC REFILL (OUTPATIENT)
Dept: FAMILY MEDICINE | Facility: CLINIC | Age: 79
End: 2024-10-31
Payer: COMMERCIAL

## 2024-10-31 DIAGNOSIS — E78.5 HYPERLIPIDEMIA, UNSPECIFIED HYPERLIPIDEMIA TYPE: ICD-10-CM

## 2024-10-31 RX ORDER — ATORVASTATIN CALCIUM 20 MG/1
20 TABLET, FILM COATED ORAL DAILY
Qty: 90 TABLET | Refills: 3 | Status: SHIPPED | OUTPATIENT
Start: 2024-10-31

## 2024-12-07 ENCOUNTER — HEALTH MAINTENANCE LETTER (OUTPATIENT)
Age: 79
End: 2024-12-07

## 2024-12-16 ENCOUNTER — TELEPHONE (OUTPATIENT)
Dept: ENDOCRINOLOGY | Facility: CLINIC | Age: 79
End: 2024-12-16

## 2025-01-09 ENCOUNTER — TRANSFERRED RECORDS (OUTPATIENT)
Dept: HEALTH INFORMATION MANAGEMENT | Facility: CLINIC | Age: 80
End: 2025-01-09
Payer: COMMERCIAL

## (undated) DEVICE — PREP DYNA-HEX 4% CHG SCRUB 4OZ BOTTLE MDS098710

## (undated) DEVICE — PIN MAYFIELD SKULL DISP A1083

## (undated) DEVICE — SPONGE SURGICAL 1/4 X 1/4 60-02

## (undated) DEVICE — PREP CHLORAPREP W/ORANGE TINT 10.5ML 930715

## (undated) DEVICE — TOOL DISSECT MIDAS MR8 14CM MATCH HEAD 3MM MR8-14MH30

## (undated) DEVICE — SU STRATAFIX PDS PLUS 2-0 SPIRAL VIOLET SPXX1B415

## (undated) DEVICE — SUTURE VICRYL+ 2-0 18 CT1/CR VLT VCP839D

## (undated) DEVICE — PROTECTOR ARM STANDARD ONE STEP

## (undated) DEVICE — SU PROLENE 5-0 24" BV-1 9702H

## (undated) DEVICE — IOM CASE FLAT FEE

## (undated) DEVICE — SU ETHILON 2-0 FS 18" 664G

## (undated) DEVICE — ESU ELEC BLADE 2.75" COATED/INSULATED E1455

## (undated) DEVICE — COLLAR CERVICAL ADULT UNIV VISTA 7045

## (undated) DEVICE — SU VICRYL+ 0 8-18 CT1/CR UND VCP840D

## (undated) DEVICE — TRAY PREP DRY SKIN SCRUB 067

## (undated) DEVICE — KIT SEALER DURASEAL EXACT SP HYDROGEL 5ML SGL USE 20-6520

## (undated) DEVICE — NEEDLE HYPO 18X1-1/2 SAFETY 305918

## (undated) DEVICE — SU PROLENE 7-0 BV175-6 24" 2 ARM MONOFILAMENT M8735

## (undated) DEVICE — TOOL DISSECT MIDAS MR8 7CM TWIST DRILL 1.5MM MR8-7TD158

## (undated) DEVICE — GLOVE BIOGEL PI ULTRATOUCH G SZ 6.5 42165

## (undated) DEVICE — GOWN LG DISP 9515

## (undated) DEVICE — DRAIN RESERVOIR 100ML JP 0070740

## (undated) DEVICE — GLOVE UNDER INDICATOR PI SZ 6.5 LF 41665

## (undated) DEVICE — SPONGE COTTONOID 1/2X1 1/2" 1-STRING 80-1404

## (undated) DEVICE — SYR 10ML SLIP TIP W/O NDL 303134

## (undated) DEVICE — CUSTOM PACK LUMBAR FUSION SNE5BLFHEA

## (undated) DEVICE — MARKER SKIN SURG MINI NON-STRL 31146020

## (undated) DEVICE — RX SURGIFLO HEMOSTATIC MATRIX W/THROMBIN 8ML 2994

## (undated) DEVICE — ESU GROUND PAD ADULT REM W/15' CORD E7507DB

## (undated) DEVICE — DRSG PRIMAPORE 03 1/8X6" 66000318

## (undated) DEVICE — SUCTION MANIFOLD NEPTUNE 2 SYS 1 PORT 702-025-000

## (undated) DEVICE — ESU PENCIL SMOKE EVAC W/ROCKER SWITCH 0703-047-000

## (undated) DEVICE — MITT PRE-OP 7 L X 5 1/2 W 5177M1

## (undated) DEVICE — IOM STANDARD SUPPLY FEE

## (undated) DEVICE — SU NUROLON 4-0 TF CR 8X18" C584D

## (undated) DEVICE — BLADE CLIPPER PIVOT SPEC GRAY DISP 9690

## (undated) DEVICE — SU PROLENE 8-0 BV130-5DA 24" 8732H

## (undated) DEVICE — DRAIN FLAT 10MM FULL PERF SIL 0070440

## (undated) RX ORDER — ONDANSETRON 2 MG/ML
INJECTION INTRAMUSCULAR; INTRAVENOUS
Status: DISPENSED
Start: 2024-06-26

## (undated) RX ORDER — CEFAZOLIN SODIUM 1 G/3ML
INJECTION, POWDER, FOR SOLUTION INTRAMUSCULAR; INTRAVENOUS
Status: DISPENSED
Start: 2024-06-26

## (undated) RX ORDER — GINSENG 100 MG
CAPSULE ORAL
Status: DISPENSED
Start: 2024-06-26

## (undated) RX ORDER — LIDOCAINE HYDROCHLORIDE AND EPINEPHRINE 10; 10 MG/ML; UG/ML
INJECTION, SOLUTION INFILTRATION; PERINEURAL
Status: DISPENSED
Start: 2024-06-26

## (undated) RX ORDER — FENTANYL CITRATE 50 UG/ML
INJECTION, SOLUTION INTRAMUSCULAR; INTRAVENOUS
Status: DISPENSED
Start: 2024-06-26